# Patient Record
Sex: FEMALE | Race: WHITE | NOT HISPANIC OR LATINO | Employment: OTHER | URBAN - NONMETROPOLITAN AREA
[De-identification: names, ages, dates, MRNs, and addresses within clinical notes are randomized per-mention and may not be internally consistent; named-entity substitution may affect disease eponyms.]

---

## 2017-02-26 ENCOUNTER — HISTORY (OUTPATIENT)
Dept: FAMILY MEDICINE | Facility: OTHER | Age: 62
End: 2017-02-26

## 2017-02-26 ENCOUNTER — OFFICE VISIT - GICH (OUTPATIENT)
Dept: FAMILY MEDICINE | Facility: OTHER | Age: 62
End: 2017-02-26

## 2017-02-26 DIAGNOSIS — H57.11 PAIN OF RIGHT EYE: ICD-10-CM

## 2017-05-15 ENCOUNTER — COMMUNICATION - GICH (OUTPATIENT)
Dept: FAMILY MEDICINE | Facility: OTHER | Age: 62
End: 2017-05-15

## 2017-05-15 DIAGNOSIS — E28.319 ASYMPTOMATIC PREMATURE MENOPAUSE: ICD-10-CM

## 2017-07-10 ENCOUNTER — HOSPITAL ENCOUNTER (OUTPATIENT)
Dept: RADIOLOGY | Facility: OTHER | Age: 62
End: 2017-07-10
Attending: FAMILY MEDICINE

## 2017-07-10 ENCOUNTER — HISTORY (OUTPATIENT)
Dept: RADIOLOGY | Facility: OTHER | Age: 62
End: 2017-07-10

## 2017-07-10 DIAGNOSIS — Z12.31 ENCOUNTER FOR SCREENING MAMMOGRAM FOR MALIGNANT NEOPLASM OF BREAST: ICD-10-CM

## 2017-07-14 ENCOUNTER — OFFICE VISIT - GICH (OUTPATIENT)
Dept: FAMILY MEDICINE | Facility: OTHER | Age: 62
End: 2017-07-14

## 2017-07-14 ENCOUNTER — HISTORY (OUTPATIENT)
Dept: FAMILY MEDICINE | Facility: OTHER | Age: 62
End: 2017-07-14

## 2017-07-14 DIAGNOSIS — K21.9 GASTRO-ESOPHAGEAL REFLUX DISEASE WITHOUT ESOPHAGITIS: ICD-10-CM

## 2017-07-14 DIAGNOSIS — L57.0 ACTINIC KERATOSIS: ICD-10-CM

## 2017-07-17 ENCOUNTER — COMMUNICATION - GICH (OUTPATIENT)
Dept: FAMILY MEDICINE | Facility: OTHER | Age: 62
End: 2017-07-17

## 2017-07-17 ENCOUNTER — COMMUNICATION - GICH (OUTPATIENT)
Dept: SURGERY | Facility: OTHER | Age: 62
End: 2017-07-17

## 2017-07-17 DIAGNOSIS — Z12.11 ENCOUNTER FOR SCREENING FOR MALIGNANT NEOPLASM OF COLON: ICD-10-CM

## 2017-07-18 ENCOUNTER — COMMUNICATION - GICH (OUTPATIENT)
Dept: SURGERY | Facility: OTHER | Age: 62
End: 2017-07-18

## 2017-07-18 DIAGNOSIS — Z12.11 ENCOUNTER FOR SCREENING FOR MALIGNANT NEOPLASM OF COLON: ICD-10-CM

## 2017-08-11 ENCOUNTER — TRANSFERRED RECORDS (OUTPATIENT)
Dept: MULTI SPECIALTY CLINIC | Facility: CLINIC | Age: 62
End: 2017-08-11

## 2017-08-11 ENCOUNTER — SURGERY (OUTPATIENT)
Dept: SURGERY | Facility: OTHER | Age: 62
End: 2017-08-11

## 2017-08-11 ENCOUNTER — HOSPITAL ENCOUNTER (OUTPATIENT)
Dept: SURGERY | Facility: OTHER | Age: 62
Discharge: HOME | End: 2017-08-11
Attending: SURGERY | Admitting: SURGERY

## 2017-08-11 ENCOUNTER — HISTORY (OUTPATIENT)
Dept: SURGERY | Facility: OTHER | Age: 62
End: 2017-08-11

## 2017-08-17 ENCOUNTER — COMMUNICATION - GICH (OUTPATIENT)
Dept: SURGERY | Facility: OTHER | Age: 62
End: 2017-08-17

## 2017-08-22 ENCOUNTER — HISTORY (OUTPATIENT)
Dept: SURGERY | Facility: OTHER | Age: 62
End: 2017-08-22

## 2017-08-22 ENCOUNTER — OFFICE VISIT - GICH (OUTPATIENT)
Dept: SURGERY | Facility: OTHER | Age: 62
End: 2017-08-22

## 2017-08-22 DIAGNOSIS — K31.9 DISEASE OF STOMACH AND DUODENUM: ICD-10-CM

## 2017-09-14 ENCOUNTER — OFFICE VISIT - GICH (OUTPATIENT)
Dept: FAMILY MEDICINE | Facility: OTHER | Age: 62
End: 2017-09-14

## 2017-09-14 ENCOUNTER — HISTORY (OUTPATIENT)
Dept: FAMILY MEDICINE | Facility: OTHER | Age: 62
End: 2017-09-14

## 2017-09-14 DIAGNOSIS — Z00.00 ENCOUNTER FOR GENERAL ADULT MEDICAL EXAMINATION WITHOUT ABNORMAL FINDINGS: ICD-10-CM

## 2017-09-14 DIAGNOSIS — B02.30 ZOSTER OCULAR DISEASE: ICD-10-CM

## 2017-12-27 NOTE — PROGRESS NOTES
Patient Information     Patient Name MRN Miguelina Foss [id number] Female 1955      Progress Notes by Fady Oconnor MD at 2017  2:15 PM     Author:  Fady Oconnor MD Service:  (none) Author Type:  Physician     Filed:  2017  3:09 PM Encounter Date:  2017 Status:  Signed     :  Fady Oconnor MD (Physician)            SUBJECTIVE:    Miguelina Estrada is a 61 y.o. female who presents for abdomen pain and a skin worry    HPI    Lots of stress with her parents as they are aging and getting demented.  She also has been overeating lately, making her GERD worse.  Has stopped Prilosec last fall.  since then would just need tums once in a while. Worrying about her job and retiring soon.  This has increased the gastroesophogeal reflux disease symptoms.  Mostly burning at night.   One night, after pushing hard with a constipated stool, she felt a tearing sensation in the epigastrium and since then the pains have seemed worse.  Pain with breathing then, radiating into the sternum.  Was not able to swallow then.  Burning into mid back.  This lasted for 2 days or so.  slowly improving and now able to swallow normally.  Had an EGD in , told she had stomach polyps and a hiatal hernia then.  Was on prilosec since then.  No blood in stool.      Nose lesion on and off for about 1 year.  She had felt it was rosacea but seems to not respond well to metrogel.  No itching.    Allergies     Allergen  Reactions     Ees [Erythromycin] Nausea And Vomiting     Levaquin [Levofloxacin] Insomnia and Tachycardia     Pcn [Penicillins] Rash     Sulfa (Sulfonamide Antibiotics) Rash   ,   Current Outpatient Prescriptions on File Prior to Visit       Medication  Sig Dispense Refill     esterified estrogens-methylTESTOSTERone, 1.25-2.5 mg, (ESTRATEST) tablet TAKE 1 TABLET BY MOUTH EVERY DAY 90 tablet 3     No current facility-administered medications on file prior to visit.    ,   Past Medical  History:     Diagnosis  Date     Hiatal hernia      Hypoglycemia     episodes of hypoglycemia if not eating      Irregular cardiac rhythm      Premature menopause     mid 30s      Pupillary abnormality of both eyes 1993    Adie's tonic pupil (bilateral)     and   Past Surgical History:      Procedure  Laterality Date     ESOPHAGOGASTRODUODENOSCOPY  2009     LAP CHOLECYSTECTOMY  2007     OCTAVIA AND BSO  2003     TUBAL LIGATION  1980s       REVIEW OF SYSTEMS:  Review of Systems   Constitutional: Negative for chills and fever.   Respiratory: Negative for shortness of breath.    Cardiovascular: Negative for chest pain.   Gastrointestinal: Positive for abdominal pain, constipation and heartburn. Negative for blood in stool, diarrhea, melena, nausea and vomiting.       OBJECTIVE:  /64  Resp 16  Wt 62.5 kg (137 lb 12.8 oz)  BMI 23.65 kg/m2    EXAM:   Physical Exam   Constitutional: She is oriented to person, place, and time and well-developed, well-nourished, and in no distress. No distress.   HENT:   Head: Normocephalic and atraumatic.   Cardiovascular: Normal rate, regular rhythm and normal heart sounds.  Exam reveals no gallop and no friction rub.    No murmur heard.  Pulmonary/Chest: Effort normal. No respiratory distress. She has no wheezes. She has no rales.   Abdominal: Soft. Bowel sounds are normal. She exhibits no distension. There is no tenderness. There is no rebound and no guarding.   Neurological: She is alert and oriented to person, place, and time.   Skin: She is not diaphoretic.   Tip of nose with small, 1 mm, rough scaling papule most consistent with actinic keratosis.  Treated with 3 cycles of cryo.   Psychiatric: Memory, affect and judgment normal.       ASSESSMENT/PLAN:    ICD-10-CM    1. Chronic GERD K21.9 GASTROSCOPY/EGD - [facility]   2. Actinic keratosis L57.0 IA DESTROY PREMALIGNANT 1ST LESION        Plan:  Will have her start OTC zantac while waiting for the EGD.  She had some dysphagia so need to  rule out a stricture as well as a cancer.  Regarding the nose, will need annual checks and treatment if it recurs.    Fady Oconnor MD ....................  7/14/2017   3:05 PM

## 2017-12-27 NOTE — PROGRESS NOTES
Patient Information     Patient Name MRN Miguelina Foss [id number] Female 1955      Progress Notes by Miranda Quintana at 7/10/2017  2:09 PM     Author:  Miranda Quintana Service:  (none) Author Type:  (none)     Filed:  7/10/2017  2:10 PM Date of Service:  7/10/2017  2:09 PM Status:  Signed     :  Miranda Quintana            Falls Risk Criteria:    Age 65 and older or under age 4        Sensory deficits    Poor vision    Use of ambulatory aides    Impaired judgment    Unable to walk independently    Meets High Risk criteria for falls:  no

## 2017-12-28 NOTE — OR POSTOP
Patient Information     Patient Name MRN Miguelina Foss [id number] Female 1955      OR PostOp by Marielle Reid RN at 2017  3:13 PM     Author:  Marielle Reid RN Service:  (none) Author Type:  NURS- Registered Nurse     Filed:  2017  3:14 PM Date of Service:  2017  3:13 PM Status:  Signed     :  Marielle Reid RN (NURS- Registered Nurse)            Discharge Note    Data:  Miguelina Estrada has been discharged home at 1423 via ambulatory accompanied by Registered Nurse.      Action:  Written discharge/follow-up instructions were provided to patient. Prescriptions : None.  Belongings sent with patient. Medications from home sent with patient/family: NA.  Equipment none .     Response:  Patient verbalized understanding of discharge instructions, reason for discharge, and necessary follow-up appointments.

## 2017-12-28 NOTE — TELEPHONE ENCOUNTER
Patient Information     Patient Name Miguelina Galeana [id number] Female 1955      Telephone Encounter by Camryn Rodgers at 2017  9:30 AM     Author:  Camryn Rodgers Service:  (none) Author Type:  (none)     Filed:  2017  9:34 AM Encounter Date:  2017 Status:  Signed     :  Camryn Rodgers            Spoke with patient she is wondering if with her EGD you wanted to do a screening colonoscopy at the same time, she says this was discussed in your appointment on 17. Camryn Rodgers LPN .......................2017  9:34 AM

## 2017-12-28 NOTE — PROCEDURES
Patient Information     Patient Name MRN Miguelina Foss [id number] Female 1955      Procedures by Barbara Goss MD at 2017  1:42 PM     Author:  Barbara Goss MD Service:  (none) Author Type:  Physician     Filed:  2017  2:26 PM Date of Service:  2017  1:42 PM Status:  Signed     :  Barbara Goss MD (Physician)        Pre-procedure Diagnoses:    1. Burning reflux [R12]    2. Special screening for malignant neoplasms, colon [Z12.11]           Post-procedure Diagnoses:    1. Gastric polyps [K31.7]    2. Other chronic gastritis [K29.50]    3. Hiatal hernia [K44.9]    4. Rectal polyp [K62.1]           Procedures:    1. COLON EGD [830674 (Custom)]               PROCEDURE NOTE    SURGEON: Barbara Goss MD    PRE-OP DIAGNOSIS:   Reflux, epigastric pain, need for screening colonoscopy      POST-OP DIAGNOSIS: hiatal hernia (2 cm), gastric polyps, gastritis, rectal polyp    PROCEDURE PLANNED:   Diagnostic EGD      Screening Colonoscopy    PROCEDURE PERFORMED:  Flexible EGD with biopsies, colonoscopy with biopsy    SPECIMEN:  Antrum, gastric polyp, GEJ biopsies and rectal poylp    ANESTHESIA:  See anesthesia note    ESTIMATED BLOOD LOSS: none    COMPLICATIONS:  None    INDICATION FOR THE PROCEDURE: The patient is a 61 y.o. female. The patient presents reflux, epigastric pain and need for screening colonoscopy. I explained to the patient the risks, benefits and alternatives to diagnostic EGD and screening colonoscopy for evaluating her complaints and checking for colon polyps and colon cancer. We specifically discussed the risks of bleeding, infection, perforation, potential inability to reach the cecum and the risks of sedation. The patient's questions were answered and the patient wished to proceed. Informed consent paperwork was completed.    PROCEDURE: The patient was taken to the endoscopy suite. Appropriate monitors were attached.  The patient was placed in the left lateral  decubitus position. Bite block was positioned. Timeout was performed confirming the patient's identity and procedure to be performed.  After appropriate sedation was confirmed, the flexible endoscope was advanced into the oropharynx. The posterior oropharynx appeared grossly normal. The scope was advanced into the proximal esophagus. The esophagus was insufflated with air. The scope was advanced under direct visualization. No acute abnormalities of the esophagus were noted. The scope was advanced into the stomach. The scope was advanced through the pylorus into the duodenal bulb. The bulb and distal duodenum appeared grossly normal. The scope was withdrawn back into the stomach. Mild gastritis was noted. Antral biopsy was obtained and sent to pathology. Gastric polyps were noted and biopsy obtained. The scope was retroflexed and the GE junction inspected. A small hiatal hernia was noted (less than 2 cm). The scope was returned to a neutral position and the stomach was decompressed. The scope was withdrawn to the GE junction and biopsy obtained. The mucosa of the esophagus was inspected while withdrawing the scope. No abnormalities were noted. The scope was withdrawn from the patient. The bite block was removed.    The patient was repositioned. After appropriate sedation, digital rectal exam was performed.  There was normal tone and no gross abnormality was noted.  The lubricated flexible colonoscope was introduced into the anus the colon was insufflated with air. The prep quality was adequate. Under direct visualization the scope was advanced to the cecum. The ileocecal valve was intubated and the terminal ileum inspected. No gross abnormality was noted. The scope was withdrawn back into the cecum. The mucosa of colon was inspected while withdrawing the scope. A 3 mm sessile polyp was noted in the rectum and removed with cold forceps. The scope was retroflexed in the rectum and the anorectal junction was inspected.  No abnormalities were noted. The scope was returned to a neutral position and the colon was decompressed. The scope was removed. The patient tolerated the procedure with no immediately apparent complication. The patient was taken to recovery in stable condition.    FOLLOW UP:  RECOMMENDATIONS high fiber diet, will call with pathology results. Continue on current medication for now.    Barbara Goss MD     CC: Fady Oconnor MD

## 2017-12-28 NOTE — PROGRESS NOTES
Patient Information     Patient Name MRN Miguelina Foss [id number] Female 1955      Progress Notes by Fady Oconnor MD at 2017  8:29 AM     Author:  Fady Oconnor MD Service:  (none) Author Type:  Physician     Filed:  2017 10:59 AM Encounter Date:  2017 Status:  Signed     :  Fady Oconnor MD (Physician)              SUBJECTIVE:    Miguelina Estrada is a 61 y.o. female who presents for px    HPI: About 3 months ago she started to have right eye symptoms, that ended up being a herpes infection.  Sees an optometrist for this now, who has been using topical antiviral medications, but he wants her to also get on an oral prevention med.  Some nerve type pains in the eye.  Current vision is normal in the right.      Otherwise is doing well.  No longer using zantac, based on EGD findings of bile acid reflux.  Now gets symptoms really only based on what she eats.    PROBLEM LIST:  Patient Active Problem List     Diagnosis  Code     Premature menopause on HRT E28.319     Rosacea L71.9     Chronic GERD K21.9     Ophthalmic herpes zoster B02.30     PAST MEDICAL HISTORY:  Past Medical History:     Diagnosis  Date     Hiatal hernia      Hypoglycemia     episodes of hypoglycemia if not eating      Irregular cardiac rhythm      Premature menopause     mid 30s      Pupillary abnormality of both eyes     Adie's tonic pupil (bilateral)      SURGICAL HISTORY:  Past Surgical History:      Procedure  Laterality Date     COLON EGD  2017            ESOPHAGOGASTRODUODENOSCOPY  2009     LAP CHOLECYSTECTOMY  2007     OCTAVIA AND BSO       TUBAL LIGATION         SOCIAL HISTORY:  Social History     Social History        Marital status:       Spouse name: Jerrod     Number of children:  1     Years of education:  N/A     Occupational History      Not on file.     Social History Main Topics       Smoking status: Never Smoker     Smokeless tobacco: Never Used     Alcohol  use No     Drug use: No     Sexual activity: Not on file     Other Topics  Concern     Not on file      Social History Narrative     Working as an  at [employer], since 9/13.     .  is retired.  One daughter in [place] born 1973                         FAMILY HISTORY:  Family History       Problem   Relation Age of Onset     Diabetes  Father 60     Other  Father 55     Atrial fibrillation       Hypertension  Mother      Heart Disease  Mother 86     silent MI, CHF       Hypertension  Brother      Cancer-breast  No Family History       CURRENT MEDICATIONS:   Current Outpatient Prescriptions       Medication  Sig Dispense Refill     esterified estrogens-methylTESTOSTERone, 1.25-2.5 mg, (ESTRATEST) tablet TAKE 1 TABLET BY MOUTH EVERY DAY 90 tablet 3     LYSINE ORAL Take 1,500 mg by mouth once daily.       ranitidine (ZANTAC 75) 75 mg tablet Take 1 tablet by mouth once daily.  0     sucralfate (CARAFATE) 1 gram tablet Take 1 tablet by mouth 3 times daily before meals. 90 tablet 1     No current facility-administered medications for this visit.      Medications have been reviewed by me and are current to the best of my knowledge and ability.    ALLERGIES:  Ees [erythromycin]; Levaquin [levofloxacin]; Pcn [penicillins]; and Sulfa (sulfonamide antibiotics)     REVIEW OF SYSTEMS:  General: denies any general problems.  Eyes: see HPI  Ears/Nose/Throat: denies problems  Cardiovascular: denies problems  Respiratory: denies problems  Gastrointestinal: see HPI  Genitourinary: denies problems  Musculoskeletal: denies problems  Skin: denies problems  Neurologic: denies problems  Psychiatric: some stress with caring for aging parents.  Endocrine: denies problems  Heme/Lymphatic: denies problems  Allergic/Immunologic: denies problems  PHQ Depression Screening 9/14/2017   Date of PHQ exam (doc flow) 9/14/2017   1. Lack of interest/pleasure 0 - Not at all   2. Feeling down/depressed 0  "- Not at all   PHQ-2 TOTAL SCORE 0   Some recent data might be hidden       OBJECTIVE:  /64  Pulse 64  Resp 16  Ht 1.626 m (5' 4\")  Wt 63 kg (138 lb 12.8 oz)  BMI 23.82 kg/m2  EXAM:   General Appearance: Pleasant, alert, appropriate appearance for age. No acute distress  Head Exam: Normal. Normocephalic, atraumatic.  Eye Exam:  Normal external eye, conjunctiva, lids, cornea. RAYA.  Ear Exam: Normal TM's bilaterally. Normal auditory canals and external ears. Non-tender.  Nose Exam: Normal external nose, mucus membranes, and septum.  OroPharynx Exam:  Dental hygiene adequate. Normal buccal mucose. Normal pharynx.  Neck Exam:  Supple, no masses or nodes.  Thyroid Exam: No nodules or enlargement.  Chest/Respiratory Exam: Normal chest wall and respirations. Clear to auscultation.  Breast Exam: No dimpling, nipple retraction or discharge. No masses or nodes.  Cardiovascular Exam: Regular rate and rhythm. S1, S2, no murmur, click, gallop, or rubs.  Gastrointestinal Exam: Soft, non-tender, no masses or organomegaly.  Lymphatic Exam: Non-palpable nodes in neck, clavicular, axillary, or inguinal regions.  Musculoskeletal Exam: Back is straight and non-tender, full ROM of upper and lower extremities.  Skin: no rash or abnormalities  Psychiatric Exam: Alert and oriented - appropriate affect.    ASSESSMENT/PLAN    ICD-10-CM    1. Routine general medical examination at a health care facility Z00.00    2. Ophthalmic herpes zoster B02.30 acyclovir (ZOVIRAX) 400 mg tablet     Ms. Torres Body mass index is 23.82 kg/(m^2). This is within the normal range for a 61 y.o. Normal range for ages 18+ is between 18.5 and 24.9.  BP Readings from Last 1 Encounters:09/14/17 : 120/64  Ms. Torres blood pressure is out of the normal range for adults. Per JNC-8 guidelines normal adult blood pressure is < 120/80, pre-hypertensive is between 120/80 and 139/89, and hypertension is 140/90 or greater. Risks of hypertension were " discussed. Patient's strategy will be to recheck blood pressure in 12 months

## 2017-12-28 NOTE — OR ANESTHESIA
Patient Information     Patient Name MRN Sex Miguelina Vazquez [id number] Female 1955      OR Anesthesia by Melissa Duque CRNA at 2017 12:02 PM     Author:  Melissa Duque CRNA Service:  (none) Author Type:  NURS- Nurse Anesthetist     Filed:  2017 12:02 PM Date of Service:  2017 12:02 PM Status:  Signed     :  Melissa Duque CRNA (NURS- Nurse Anesthetist)                                                           ANESTHESIA ASSESSMENT    Date: 17 Time: 12:02 PM      Patient:  Miguelina Estrada    Procedure(s) (LRB):  COLONOSCOPY ESOPHAGEALGASTRODUODENOSCOPY (N/A)    Past Medical History:     Diagnosis  Date     Hiatal hernia      Hypoglycemia     episodes of hypoglycemia if not eating      Irregular cardiac rhythm      Premature menopause     mid 30s      Pupillary abnormality of both eyes     Adie's tonic pupil (bilateral)        Past Surgical History:      Procedure  Laterality Date     ESOPHAGOGASTRODUODENOSCOPY       LAP CHOLECYSTECTOMY  2007     OCTAVIA AND BSO  2003     TUBAL LIGATION  1980s       Family History       Problem   Relation Age of Onset     Diabetes  Father 60     Other  Father 55     Atrial fibrillation       Hypertension  Mother      Heart Disease  Mother 86     silent MI, CHF       Hypertension  Brother      Cancer-breast  No Family History        Patient Active Problem List     Diagnosis  Code     Premature menopause on HRT E28.319     Rosacea L71.9     Chronic GERD K21.9     Special screening for malignant neoplasms, colon Z12.11       Prescriptions Prior to Admission       Medication  Sig Dispense Refill     esterified estrogens-methylTESTOSTERone, 1.25-2.5 mg, (ESTRATEST) tablet TAKE 1 TABLET BY MOUTH EVERY DAY 90 tablet 3     LYSINE ORAL Take 1,500 mg by mouth once daily.       ranitidine (ZANTAC 75) 75 mg tablet Take 75 mg by mouth 2 times daily.         Allergies:  Allergies     Allergen  Reactions     Ees [Erythromycin] Nausea And  Vomiting     Levaquin [Levofloxacin] Insomnia and Tachycardia     Pcn [Penicillins] Rash     Sulfa (Sulfonamide Antibiotics) Rash       Review of Systems:  GERD: Yes (none today)  Chest pain: No  Shortness of breath: No  Recent fever: No  Poor exercise tolerance: No  Bleeding tendency: No  Pregnant: No  Anesthesia Complications: Slow wake (sensitive to anethetics per pt report)      History    Smoking Status      Never Smoker   Smokeless Tobacco      Never Used     Social History     Social History        Marital status:       Spouse name: Jerrod     Number of children:  1     Years of education:  N/A     Social History Main Topics       Smoking status: Never Smoker     Smokeless tobacco: Never Used     Alcohol use No     Drug use: No     Sexual activity: Not Asked     Other Topics  Concern     None      Social History Narrative     Working as an  at [employer], since 9/13.     .  is retired.  One daughter in [place] born 1973                           Physical Examination:  /65  Pulse 73  Temp 98.2  F (36.8  C)  Resp 16  SpO2 100%  Breastfeeding? No There is no height or weight on file to calculate BMI. There is no height or weight on file to calculate BSA.  Dental Condition: Good     Mallampati Score (Airway): II  Cardiovascular: Normal  Pulmonary: Normal  Other: N/A    Recent Labs [facility]:    No results for input(s): SODIUM, POTASSIUM, CHLORIDE, YP7DQONI, ANIONGAP, BUN, CREATININE, BUNCREARATIO, CALCIUM, GLUCOSE, GLUCOSEMETER, KETONES, MAGNESIUM, WBC, HGB, HCT, PLT, ABORH, RHTYPE, PREGURINE, BHCGQL, HCGBETAQUANT, INR in the last 72 hours.          Assessment/Plan:  ASA Class: II  Risk of dental injury discussed: Yes  NPO status confirmed: Yes  Anesthetic Plan: MAC  Risk/Benefit/Alt discussed: Yes  Questions answered: Yes  Emergency Case?: No  Labs/ECG/Radiology Reviewed?: Yes      H&P Reviewed.  Patient Examined.      Provider Electronic  Signature:  Melissa Duque CRNA

## 2017-12-28 NOTE — PROGRESS NOTES
Patient Information     Patient Name MRN Miguelina Foss [id number] Female 1955      Progress Notes by Barbara Goss MD at 2017  2:30 PM     Author:  Barbara Goss MD Service:  (none) Author Type:  Physician     Filed:  2017  1:56 PM Encounter Date:  2017 Status:  Signed     :  Barbara Goss MD (Physician)            Primary Care Physician: Fady Oconnor MD    HPI:   Patient is here for follow up. The patient is 61 y.o. female with chronic reflux. She recently had EGD with biopsy and screening colonoscopy. No adenomatous polyps were noted on colonoscopy-10 year screening recommended. She had EGD biopsies that showed gastropathy, fundic gland polyp and reflux changes. She is not wanting to take proton pump inhibitor due to side effects. She hasn't had dark stools or diarrhea. No nausea or vomiting.      ASSESSMENT AND PLAN/RECOMMENDATIONS:   Gastropathy and mild reflux changes of the distal esophagus. Use the carafate three times daily-can use at bedtime too if needed. H2 blocker once day. Discussed the pathophysiology of bile reflux gastritis and the importance of acid and bile balancing out for healthy stomach lining, digestion and absorption of nutrients. Patient expressed understanding. She will call if she has any ongoing symptoms or is not much improved in the next couple of weeks.       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       ranitidine (ZANTAC 75) 75 mg tablet Take 75 mg by mouth 2 times daily.       No current facility-administered medications on file prior to visit.      ALLERGIES/SENSITIVITIES  Allergies     Allergen  Reactions     Ees [Erythromycin] Nausea And Vomiting     Levaquin [Levofloxacin] Insomnia and Tachycardia     Pcn [Penicillins] Rash     Sulfa (Sulfonamide Antibiotics) Rash     REVIEW OF SYSTEMS:  GENERAL: No fevers or chills. Denies fatigue, recent weight loss.  HEENT: No sinus drainage. No changes with vision or hearing. No difficulty swallowing.   LYMPHATICS:  No swollen nodes in axilla, neck or groin.  CARDIOVASCULAR: Denies chest pain, palpitations and dyspnea on exertion.  PULMONARY: No shortness of breath or cough. No increase in sputum production.  GI: Denies melena, bright red blood in stools. No hematemesis. No constipation or diarrhea.  : No dysuria or hematuria.  SKIN: No recent rashes or ulcers.   HEMATOLOGY:  No history of easy bruising or bleeding.  ENDOCRINE:  No history of diabetes or thyroid problems.  NEUROLOGY:  No history of seizures or headaches. No motor or sensory changes.    PHYSICAL EXAM  /60  Pulse 76  Wt 63.1 kg (139 lb 2 oz)  BMI 23.88 kg/m2  GENERAL: Healthy appearing patient in no acute distress. Pleasant and cooperative with exam and interview.   HEENT: Head-normocephalic. Eyes-no scleral icterus. Nose-no nasal drainage. No lesions. Mouth-oral mucosa pink and moist, no lesions.  NECK: Supple. No thyroid nodules. Trachea midline.  LYMPHATICS:  No cervical, axillary or supraclavicular adenopathy.  CV: Regular rate and rhythm, no murmurs. No peripheral edema.  LUNGS:  No respiratory distress. Clear bilaterally to auscultation.  ABDOMEN: Non distended. Bowel  sounds active. Soft, non-tender, no hepatosplenomegaly or hernias. No peritoneal signs.  SKIN: Pink, warm and dry. No jaundice. No rash.  NEURO:  Cranial nerves II-XII grossly intact. Alert and oriented.  PSYCH: Appropriate mood and affect.      IMAGING/LAB  I personally reviewed patient's recent EGD photos, report and pathology report.      Barbara Goss MD

## 2017-12-28 NOTE — TELEPHONE ENCOUNTER
Patient Information     Patient Name MRMiguelina Myers [id number] Female 1955      Telephone Encounter by Brigitte Hill at 2017 12:59 PM     Author:  Brigitte Hill Service:  (none) Author Type:  (none)     Filed:  2017  1:07 PM Encounter Date:  2017 Status:  Signed     :  Brigitte Hill            Screening Questions for the Scheduling of Screening Colonoscopies   (If Colonoscopy is diagnostic, Provider should review the chart before scheduling.)  Are you younger than 50 or older than 80?  NO   Do you take aspirin or fish oil?  NO  (if yes, tell patient to stop 1 week prior to Colonoscopy)  Do you take warfarin (Coumadin), clopidogrel (Plavix), apixaban (Eliquis), dabigatram (Pradaxa), rivaroxaban (Xarelto) or any blood thinner? NO   Do you use oxygen at home?  NO  Do you have kidney disease? NO  Are you on dialysis? NO  Have you had a stroke or heart attack in the last year? NO  Have you had a stent in your heart or any blood vessel in the last year? NO  Have you had a transplant of any organ? NO  Have you had a colonoscopy or upper endoscopy (EGD) before? YES -  EGD         When?   - [place]   Date of scheduled Colonoscopy. 08/10/2017  Provider JARRELL   Pharmacy [facility]

## 2017-12-28 NOTE — TELEPHONE ENCOUNTER
Patient Information     Patient Name MRN Miguelina Foss [id number] Female 1955      Telephone Encounter by Brigitte Hill at 2017 10:22 AM     Author:  Brigitte Hill  Service:  (none) Author Type:  (none)     Filed:  2017  2:18 PM  Encounter Date:  2017 Status:  Addendum     :  Brigitte Hill        Related Notes: Original Note by Brigitte Hill filed at 2017 10:23 AM            Patient referred by Dr. Oconnor for a EGD and screening colonoscopy,  Diagnosis is chronic GERD for the EGD.  Please advise.    Thank you.

## 2017-12-28 NOTE — OR ANESTHESIA
"Patient Information     Patient Name MRN Sex Miguelina Vazquez [id number] Female 1955      OR Anesthesia by Melissa Duque CRNA at 2017  1:39 PM     Author:  Melissa Duque CRNA Service:  (none) Author Type:  NURS- Nurse Anesthetist     Filed:  2017  1:39 PM Date of Service:  2017  1:39 PM Status:  Signed     :  Melissa Duque CRNA (NURS- Nurse Anesthetist)            Anesthesia Post Operative Care Note    Name: Miguelina Estrada  MRN:   [id number]  :    1955       Procedure Done:  See Surgeon Note   Case Cancelled for Anesthetic Reason:  No      Anesthesia Technique    Anesthetic Type:  MAC       MAC Type:  NC     Oral Trauma:  No    Intraoperative Course   Hemodynamics:  Stable  Post Op Heart:  No Post Op Heart        Echocardiogram (if \"Yes\" - see report):  No    Ventilation Normal:  Yes Lung Sounds:  Normal      PACU Course      Nondepolarizer Used: No    Reintubation:  No   Hemodynamics:  Stable      Hydration: Euvolemic   Temperature:  36.1 - 38.3      Mental Status:  Awake, alert, follows commands   Pain Management:  Adequate   Regional Block:  No   Anesthesia Complications:  None      Vital Signs:  Temp: 98.2  F (36.8  C)  Pulse: 73  BP: 135/65  Resp: 16  SpO2: 100 %                       Active Lines:  Patient Lines/Drains/Airways Status    Active Line     Name: Placement date: Placement time: Site: Days:    PERIPHERAL VAD Right Hand 17   1130   Hand   less than 1                Intake & Output:       Labs:  No results for input(s): IJ2ZWWXBVTS, MNQ7ASLABIBI, PHARTERIAL, YOV2DRNQOJIQ, D7NCKELAPVMV in the last 24 hours.    No results for input(s): MAGNESIUM in the last 24 hours.    No results for input(s): GLUCOSEMETER in the last 720 hours.        Melissa Duque CRNA ....................  2017   1:39 PM          "

## 2017-12-28 NOTE — PATIENT INSTRUCTIONS
Patient Information     Patient Name MRMiguelina Myers [id number] Female 1955      Patient Instructions by Barbara Goss MD at 2017  2:30 PM     Author:  Barbara Goss MD Service:  (none) Author Type:  Physician     Filed:  2017  3:16 PM Encounter Date:  2017 Status:  Signed     :  Barbara Goss MD (Physician)            Try the carafate three times a day. You can add another at bedtime if you need to. Let me know if you are not improving in the next couple times a week.

## 2017-12-28 NOTE — TELEPHONE ENCOUNTER
Patient Information     Patient Name MRMiguelina Myers [id number] Female 1955      Telephone Encounter by Naima Walter at 2017  2:42 PM     Author:  Naima Walter Service:  (none) Author Type:  (none)     Filed:  2017  2:42 PM Encounter Date:  2017 Status:  Signed     :  Naima Walter            Please see result note.  Naima Walter LPN.......................... 2017  2:42 PM

## 2017-12-28 NOTE — TELEPHONE ENCOUNTER
Patient Information     Patient Name MRN Miguelina Foss [id number] Female 1955      Telephone Encounter by Barbara Goss MD at 2017 12:40 PM     Author:  Barbara Goss MD Service:  (none) Author Type:  Physician     Filed:  2017 12:40 PM Encounter Date:  2017 Status:  Signed     :  Barbara Goss MD (Physician)            Ok to schedule. Thanks! Barbara Goss MD ....................  2017   12:40 PM

## 2017-12-29 NOTE — H&P
Patient Information     Patient Name MRN Miguelina Foss [id number] Female 1955      H&P by Barbara Goss MD at 2017 12:36 PM     Author:  Barbara Goss MD Service:  (none) Author Type:  Physician     Filed:  2017 12:38 PM Date of Service:  2017 12:36 PM Status:  Signed     :  Barbara Goss MD (Physician)            CHIEF COMPLAINT / REASON FOR PROCEDURE:  Reflux, need for screening colonoscopy    PERTINENT HISTORY   Patient complains of recurrent reflux. Previous colonoscopy none, previous EGD 8 years ago-small hiatal hernia, gastric polyps. No family history of colon polyps or colon cancer.    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 See HPI.    ALLERGIES/SENSITIVITIES:   Allergies     Allergen  Reactions     Ees [Erythromycin] Nausea And Vomiting     Levaquin [Levofloxacin] Insomnia and Tachycardia     Pcn [Penicillins] Rash     Sulfa (Sulfonamide Antibiotics) Rash        CURRENT MEDICATIONS:    Current Facility-Administered Medications        Medication  Dose Route Frequency Last Rate     lactated Ringers infusion  25 mL/hr Intravenous continuous 25 mL/hr (17 1130)     lidocaine (1%) injection 0.1-1 mL  0.1-1 mL Intra-Dermal one time prn       sodium chloride 0.9% 5 mL syringe (NORMAL SALINE)  5 mL Intravenous Each Time PRN        Prior to Admission medications          Medication Sig Start Date  End Date Taking? Last Dose Authorizing Provider   esterified estrogens-methylTESTOSTERone, 1.25-2.5 mg, (ESTRATEST) tablet TAKE 1 TABLET BY MOUTH EVERY DAY 5/16/17   8/10/2017 at 2000 Fady Oconnor MD   LYSINE ORAL Take 1,500 mg by mouth once daily.   Yes 8/10/2017 at 2000 Reported, Patient   ranitidine (ZANTAC 75) 75 mg tablet Take 75 mg by mouth 2 times daily.   Yes 8/10/2017 at 0700 Reported, Patient     PRE-SEDATION ASSESSMENT:    Lung Exam:  Normal  Heart Exam:  Normal    Comment(s):      IMPRESSION:  Chronic reflux, need for screening colonoscopy.    PLAN:  I discussed diagnostic EGD and colonoscopy with the patient.    Barbara Goss MD

## 2017-12-30 NOTE — NURSING NOTE
Patient Information     Patient Name MRN Miguelina Foss [id number] Female 1955      Nursing Note by Shannan Morrison at 2017  2:15 PM     Author:  Shannan Morrison Service:  (none) Author Type:  (none)     Filed:  2017  2:51 PM Encounter Date:  2017 Status:  Signed     :  Shannan Morrison            Coming in to talk about stomach issues, her GERD is bad. Last week was bad. Issues, check spot on the face  Shannan Morrison ....................  2017   2:17 PM

## 2017-12-30 NOTE — NURSING NOTE
Patient Information     Patient Name MRN Miguelina Foss [id number] Female 1955      Nursing Note by Shannan Morrison at 2017  8:30 AM     Author:  Shannan Morrison Service:  (none) Author Type:  (none)     Filed:  2017  8:28 AM Encounter Date:  2017 Status:  Signed     :  Shannan Morrison            Coming in for a Physical   Shannan Morrison ....................  2017   8:19 AM

## 2017-12-30 NOTE — NURSING NOTE
Patient Information     Patient Name MRMiguelina Myers [id number] Female 1955      Nursing Note by Marcy Sabillon at 2017  2:30 PM     Author:  Marcy Sabillon Service:  (none) Author Type:  (none)     Filed:  2017  2:44 PM Encounter Date:  2017 Status:  Signed     :  Marcy Sabillon            Here today to follow up from a EGD and Colonoscopy.  Marcy Sabillon LPN..........2017  2:41 PM

## 2018-01-03 NOTE — PROGRESS NOTES
"Patient Information     Patient Name MRN Miguelina Foss [id number] Female 1955      Progress Notes by Veronica Rodriguez at 2017  8:30 AM     Author:  Veronica Rodriguez Service:  (none) Author Type:  PHYS- Nurse Practitioner     Filed:  3/2/2017  7:32 PM Encounter Date:  2017 Status:  Signed     :  Veronica Rodriguez (PHYS- Nurse Practitioner)            HPI:    Miguelina Estrada is a 61 y.o. female who presents to clinic today for pain in right eye. [facility] , had a usual eye apt on  and believes there was something mild on her lower lid present then, \"but it was fine\".   She then developed what she thought was an inflamed chalazan, She then saw Dr. Page briefly 5 days ago for that.  Was instructed to use warm packs, and gel drops and return to see him Friday if it was not improved. She reports that he indicated she may need to have it drained if it had not improved.  Swelling has gone down somewhat but pt feels it is more \"pointed\" now and it feels like it is poking her cornea. Rates discomfort 7-8/10. Pain was up to 10 during the night. Reports has had chazilian in the past but did not feel like this. Vision \"seems ok, is tearing somewhat so a little blurry\".  No fever or chills. Has had some nausea, but otherwise is feeling well. Pain was radiating into side of face. Pokey sensation in eye for about a week. Has history of herpes. Gets a rash on her posterior leg, for which she takes lysine, this has reduced the frequency. Also reports recently did have a cold sore this past week.      Past Medical History      Diagnosis   Date     Hiatal hernia       Hypoglycemia       episodes of hypoglycemia if not eating      Irregular cardiac rhythm       Premature menopause       mid 30s      Pupillary abnormality of both eyes       Adie's tonic pupil (bilateral)      Past Surgical History      Procedure  Laterality Date     " Fabrizio and bso  2003     Lap cholecystectomy  2007     Tubal ligation  1980s     Esophagogastroduodenoscopy  2009     Social History     Substance Use Topics       Smoking status: Never Smoker     Smokeless tobacco: Never Used     Alcohol use No     Current Outpatient Prescriptions       Medication  Sig Dispense Refill     esterified estrogens-methylTESTOSTERone, 1.25-2.5 mg, (ESTRATEST) tablet TAKE 1 TABLET BY MOUTH ONCE DAILY 90 tablet 3     metroNIDAZOLE (METROGEL) 0.75 % gel Apply 1 Film topically to affected area(s) 2 times daily. 45 g 12     omeprazole (PRILOSEC) 20 mg capsule Take 1 capsule by mouth once daily before a meal.  0     No current facility-administered medications for this visit.      Medications have been reviewed by me and are current to the best of my knowledge and ability.    Allergies     Allergen  Reactions     Ees [Erythromycin] Nausea And Vomiting     Levaquin [Levofloxacin] Insomnia and Tachycardia     Pcn [Penicillins] Rash     Sulfa (Sulfonamide Antibiotics) Rash       ROS:  Refer to HPI, otherwise negative.    EXAM:  General appearance: well appearing female, in mild distress  Head: normocephalic, atraumatic  Eyes: conjunctivae slightly injected on left, tearing and injected on right. Lower lid just lateral to duct-inner canthus with 2-3 mm concave lesion. No foreign body visible. Wood light exam with small 1-2 mm comma shaped corneal abrasion at 5:00 o'clock.   Orophayrnx: moist mucous membranes,   Respiratory: clear to auscultation bilaterally  Cardiac: RRR with no murmurs  Dermatological: no rashes or lesions  Psychological: normal affect, alert and pleasant    ASSESSMENT/PLAN:    ICD-10-CM    1. Eye pain, right H57.11 IA EYE EXAM ESTABLISHED PT C      tetracaine 0.5 % ophthalmic solution 2 Drop (TETRACAINE)       Plan: Lower lid lesion, discussed with pt., 2 drops of tetracaine, fluorescein applied, Wood light exam shows small corneal abrasion.  Probable herpetic lesion of lower lid.  Consulted with Dr. Mayorga at Dr. Osorio office. .    He agrees to examine patient at 11:15 AM. Discussed with pt and she agree to see him.   Instructed she may need to follow up with Dr. Vieira on Monday as well.    See patient instructions. Pt in agreements with plan.  No further questions.       Patient Instructions   I spoke to Dr. Mayorga at [facility] office and he is willing to have a look at your eye today at 11:15 today.     [facility] office is [facility], across from the police station. He will meet you in back.     We discussed treatment and he will order any medications you need.

## 2018-01-03 NOTE — PATIENT INSTRUCTIONS
Patient Information     Patient Name MRN Miguelina Foss [id number] Female 1955      Patient Instructions by Veronica Rodriguez at 2017  8:30 AM     Author:  Veronica Rodriguez Service:  (none) Author Type:  PHYS- Nurse Practitioner     Filed:  2017  9:50 AM Encounter Date:  2017 Status:  Signed     :  Veronica Rodriguez (PHYS- Nurse Practitioner)            I spoke to Dr. Mayorga at [facility] and he is willing to have a look at your eye today at 11:15 today.     [facility] office is [facility], across from the police station. He will meet you in back.     We discussed treatment and he will order any medications you need.

## 2018-01-03 NOTE — NURSING NOTE
Patient Information     Patient Name Miguelina Galeana [id number] Female 1955      Nursing Note by Emily Ortiz at 2017  8:30 AM     Author:  Emily Ortiz Service:  (none) Author Type:  (none)     Filed:  2017  8:45 AM Encounter Date:  2017 Status:  Signed     :  Emily Ortiz            She has something on the inside lower lid of her right eye and it is rubbing on her eye and causing her a lot of pain. It is red and irritated.  Emily Ortiz LPN..................2017   8:44 AM

## 2018-01-05 NOTE — TELEPHONE ENCOUNTER
Patient Information     Patient Name MRMiguelina Myers [id number] Female 1955      Telephone Encounter by Kay Lundberg at 2017 11:09 AM     Author:  Kay Lundberg Service:  (none) Author Type:  (none)     Filed:  2017 11:09 AM Encounter Date:  5/15/2017 Status:  Signed     :  Kay Lundberg            Rx faxed to pharmacy.  Kay HARRINGTON, ADELA.......2017..11:09 AM

## 2018-01-05 NOTE — TELEPHONE ENCOUNTER
Patient Information     Patient Name MRMiguelina Myers [id number] Female 1955      Telephone Encounter by Marlin Huddleston RN at 5/15/2017  4:31 PM     Author:  Marlin Huddleston RN Service:  (none) Author Type:  (none)     Filed:  5/15/2017  4:38 PM Encounter Date:  5/15/2017 Status:  Signed     :  Marlin Huddleston RN (NURS- Registered Nurse)            This is a Refill request from: [facility]  Name of Medication: Esterified Estrogens-methltestosterone  Quantity requested: #90 R-3  Last fill date: 2016  Due for refill: Yes  Last visit with FADY OCONNOR was on: 2016 in [facility]  PCP:  Fady Oconnor MD  Controlled Substance Agreement:  N/A   Diagnosis r/t this medication request: premature menopause     Unable to complete prescription refill per RN Medication Refill Policy.................... Marlin Huddleston ....................  5/15/2017   4:36 PM

## 2018-01-23 ENCOUNTER — COMMUNICATION - GICH (OUTPATIENT)
Dept: FAMILY MEDICINE | Facility: OTHER | Age: 63
End: 2018-01-23

## 2018-01-23 DIAGNOSIS — Z79.890 HORMONE REPLACEMENT THERAPY (POSTMENOPAUSAL): ICD-10-CM

## 2018-01-23 DIAGNOSIS — E28.319 ASYMPTOMATIC PREMATURE MENOPAUSE: ICD-10-CM

## 2018-01-26 ENCOUNTER — COMMUNICATION - GICH (OUTPATIENT)
Dept: FAMILY MEDICINE | Facility: OTHER | Age: 63
End: 2018-01-26

## 2018-01-26 DIAGNOSIS — Z79.890 HORMONE REPLACEMENT THERAPY (POSTMENOPAUSAL): ICD-10-CM

## 2018-01-26 DIAGNOSIS — E28.319 ASYMPTOMATIC PREMATURE MENOPAUSE: ICD-10-CM

## 2018-01-27 VITALS
DIASTOLIC BLOOD PRESSURE: 74 MMHG | RESPIRATION RATE: 16 BRPM | WEIGHT: 140 LBS | HEIGHT: 64 IN | TEMPERATURE: 97.4 F | SYSTOLIC BLOOD PRESSURE: 116 MMHG | HEART RATE: 64 BPM | WEIGHT: 138.8 LBS | HEART RATE: 88 BPM | BODY MASS INDEX: 23.7 KG/M2 | DIASTOLIC BLOOD PRESSURE: 64 MMHG | BODY MASS INDEX: 24.03 KG/M2 | SYSTOLIC BLOOD PRESSURE: 120 MMHG

## 2018-01-27 VITALS
SYSTOLIC BLOOD PRESSURE: 124 MMHG | WEIGHT: 137.8 LBS | RESPIRATION RATE: 16 BRPM | BODY MASS INDEX: 23.65 KG/M2 | DIASTOLIC BLOOD PRESSURE: 64 MMHG

## 2018-01-27 VITALS — HEART RATE: 76 BPM | DIASTOLIC BLOOD PRESSURE: 60 MMHG | SYSTOLIC BLOOD PRESSURE: 130 MMHG | WEIGHT: 139.13 LBS

## 2018-02-12 ENCOUNTER — OFFICE VISIT (OUTPATIENT)
Dept: FAMILY MEDICINE | Facility: OTHER | Age: 63
End: 2018-02-12
Attending: FAMILY MEDICINE
Payer: COMMERCIAL

## 2018-02-12 VITALS
BODY MASS INDEX: 24.55 KG/M2 | HEART RATE: 64 BPM | WEIGHT: 143 LBS | TEMPERATURE: 98.7 F | SYSTOLIC BLOOD PRESSURE: 138 MMHG | DIASTOLIC BLOOD PRESSURE: 72 MMHG

## 2018-02-12 DIAGNOSIS — R35.0 URINARY FREQUENCY: Primary | ICD-10-CM

## 2018-02-12 LAB
ALBUMIN UR-MCNC: NEGATIVE MG/DL
APPEARANCE UR: CLEAR
BILIRUB UR QL STRIP: NEGATIVE
COLOR UR AUTO: YELLOW
GLUCOSE UR STRIP-MCNC: NEGATIVE MG/DL
HGB UR QL STRIP: NEGATIVE
KETONES UR STRIP-MCNC: NEGATIVE MG/DL
LEUKOCYTE ESTERASE UR QL STRIP: NEGATIVE
NITRATE UR QL: NEGATIVE
PH UR STRIP: 7 PH (ref 5–7)
SOURCE: NORMAL
SP GR UR STRIP: <1.005 (ref 1–1.03)
UROBILINOGEN UR STRIP-ACNC: 0.2 EU/DL (ref 0.2–1)

## 2018-02-12 PROCEDURE — 81003 URINALYSIS AUTO W/O SCOPE: CPT | Performed by: FAMILY MEDICINE

## 2018-02-12 PROCEDURE — 99213 OFFICE O/P EST LOW 20 MIN: CPT | Performed by: FAMILY MEDICINE

## 2018-02-12 PROCEDURE — 87086 URINE CULTURE/COLONY COUNT: CPT | Performed by: FAMILY MEDICINE

## 2018-02-12 RX ORDER — CEPHALEXIN 500 MG/1
500 CAPSULE ORAL 3 TIMES DAILY
Qty: 30 CAPSULE | Refills: 0 | Status: SHIPPED | OUTPATIENT
Start: 2018-02-12 | End: 2018-04-02

## 2018-02-12 RX ORDER — SUCRALFATE 1 G/1
1 TABLET ORAL PRN
Refills: 1 | COMMUNITY
Start: 2017-08-22 | End: 2018-04-20

## 2018-02-12 RX ORDER — ESTROGENS, ESTERIFIED AND METHYLTESTOSTERONE 2.5; 1.25 MG/1; MG/1
1.25 TABLET, COATED ORAL DAILY
Refills: 3 | COMMUNITY
Start: 2018-01-29 | End: 2018-09-24

## 2018-02-12 RX ORDER — ACYCLOVIR 400 MG/1
400 TABLET ORAL 2 TIMES DAILY
Refills: 3 | COMMUNITY
Start: 2017-12-01 | End: 2018-08-28

## 2018-02-12 RX ORDER — TRIFLURIDINE 10 MG/ML
1 SOLUTION OPHTHALMIC PRN
Refills: 0 | COMMUNITY
Start: 2017-09-14 | End: 2024-01-11

## 2018-02-12 ASSESSMENT — ENCOUNTER SYMPTOMS
FREQUENCY: 1
NAUSEA: 0
HEMATURIA: 0
FLANK PAIN: 0
CHILLS: 0
VOMITING: 0
DYSURIA: 1
FEVER: 0

## 2018-02-12 ASSESSMENT — PAIN SCALES - GENERAL: PAINLEVEL: MODERATE PAIN (4)

## 2018-02-12 NOTE — MR AVS SNAPSHOT
"              After Visit Summary   2018    Miguelina Estrada    MRN: [id number]           Patient Information     Date Of Birth          1955        Visit Information        Provider Department      2018 9:30 AM Fady Oconnor MD [facility]        Today's Diagnoses     Urinary frequency    -  1       Follow-ups after your visit        Follow-up notes from your care team     Return if symptoms worsen or fail to improve.      Who to contact     If you have questions or need follow up information about today's clinic visit or your schedule please contact [facility] AND \[facility]\"" directly at [phone].  Normal or non-critical lab and imaging results will be communicated to you by Noblivityhart, letter or phone within 4 business days after the clinic has received the results. If you do not hear from us within 7 days, please contact the clinic through Noblivityhart or phone. If you have a critical or abnormal lab result, we will notify you by phone as soon as possible.  Submit refill requests through Social Median or call your pharmacy and they will forward the refill request to us. Please allow 3 business days for your refill to be completed.          Additional Information About Your Visit        MyChart Information     Social Median lets you send messages to your doctor, view your test results, renew your prescriptions, schedule appointments and more. To sign up, go to [email or web address] . Click on \"Log in\" on the left side of the screen, which will take you to the Welcome page. Then click on \"Sign up Now\" on the right side of the page.     You will be asked to enter the access code listed below, as well as some personal information. Please follow the directions to create your username and password.     Your access code is: [email or web address]  Expires: 2018 10:23 AM     Your access code will  in 90 days. If you need help or a new code, please call your [place] clinic or " [phone].        Care EveryWhere ID     This is your Care EveryWhere ID. This could be used by other organizations to access your [facility] medical records  [id number]        Your Vitals Were     Pulse Temperature Breastfeeding? BMI (Body Mass Index)          64 98.7  F (37.1  C) No 24.55 kg/m2         Blood Pressure from Last 3 Encounters:   02/12/18 138/72   09/14/17 120/64   08/22/17 130/60    Weight from Last 3 Encounters:   02/12/18 143 lb (64.9 kg)   09/14/17 138 lb 12.8 oz (63 kg)   08/22/17 139 lb 2 oz (63.1 kg)              We Performed the Following     UA reflex to Microscopic     Urine Culture Aerobic Bacterial          Today's Medication Changes          These changes are accurate as of 2/12/18 10:23 AM.  If you have any questions, ask your nurse or doctor.               Start taking these medicines.        Dose/Directions    cephALEXin 500 MG capsule   Commonly known as:  KEFLEX   Used for:  Urinary frequency   Started by:  Fady Oconnor MD        Dose:  500 mg   Take 1 capsule (500 mg) by mouth 3 times daily   Quantity:  30 capsule   Refills:  0            Where to get your medicines      These medications were sent to [id number] - [place], [place] - [address]  [address], [facility] [address]     Phone:  [phone]     cephALEXin 500 MG capsule                Primary Care Provider Office Phone # Fax #    Fady Oconnor [phone]       [address] [facility] [address]        Equal Access to Services     TRUDY GRAHAM AH: Hadii tammie shero Sopaulette, waaxda luqadaha, qaybta kaalmada adeegyada, waxGulfport Behavioral Health Systemasiya leavitt. So [facility] [phone].    ATENCIÓN: Si habla español, tiene a coates disposición servicios gratuitos de asistencia lingüística. Llame al [phone].    We comply with applicable federal civil rights laws and [place] laws. We do not discriminate on the basis of race, color, national  origin, age, disability, sex, sexual orientation, or gender identity.            Thank you!     Thank you for choosing [facility] AND [facility]  for your care. Our goal is always to provide you with excellent care. Hearing back from our patients is one way we can continue to improve our services. Please take a few minutes to complete the written survey that you may receive in the mail after your visit with us. Thank you!             Your Updated Medication List - Protect others around you: Learn how to safely use, store and throw away your medicines at www.disposemymeds.org.          This list is accurate as of 2/12/18 10:23 AM.  Always use your most recent med list.                   Brand Name Dispense Instructions for use Diagnosis    acyclovir 400 MG tablet    ZOVIRAX     Take 400 mg by mouth 2 times daily        cephALEXin 500 MG capsule    KEFLEX    30 capsule    Take 1 capsule (500 mg) by mouth 3 times daily    Urinary frequency       EEMT 1.25-2.5 MG Tabs      Take 1.25 mg by mouth daily        RA L-LYSINE 1000 MG Tabs   Generic drug:  Lysine HCl      Take 1,000 mg by mouth daily        sucralfate 1 GM tablet    CARAFATE     Take 1 g by mouth as needed        trifluridine 1 % ophthalmic solution    VIROPTIC     Place 1 drop into both eyes as needed

## 2018-02-12 NOTE — PROGRESS NOTES
HPI Comments: Urinary urgency.  Is worried about either an infection or a stone.  Initially 3 days ago had spasms and urgency.  No dysuria.  Pushed the water.  Slept poorly due to the discomfort.  Last night was up hourly.  No fevers.  No blood in urine.  Has similar issues 30 years ago, which was a stone.    UTI   Pertinent negatives include no chills, fever, nausea or vomiting.         Review of Systems   Constitutional: Negative for chills and fever.   Gastrointestinal: Negative for nausea and vomiting.   Genitourinary: Positive for dysuria, frequency and urgency. Negative for flank pain and hematuria.     Current Outpatient Prescriptions   Medication     cephALEXin (KEFLEX) 500 MG capsule     acyclovir (ZOVIRAX) 400 MG tablet     Est Estrogens-Methyltest (EEMT) 1.25-2.5 MG TABS     Lysine HCl (RA L-LYSINE) 1000 MG TABS     sucralfate (CARAFATE) 1 GM tablet     trifluridine (VIROPTIC) 1 % ophthalmic solution     No current facility-administered medications for this visit.      Past Medical History:   Diagnosis Date     Asymptomatic premature menopause     mid 30s     Cardiac arrhythmia     No Comments Provided     Diaphragmatic hernia without obstruction or gangrene     No Comments Provided     Hypoglycemia     episodes of hypoglycemia if not eating     Pupillary abnormality of both eyes     1993,Palmira's tonic pupil (bilateral)         Physical Exam   Constitutional: She is oriented to person, place, and time and well-developed, well-nourished, and in no distress. No distress.   Abdominal: Soft. Bowel sounds are normal. She exhibits no distension. There is tenderness. There is no rebound.   Mile suprapubic pain on palpation   Neurological: She is alert and oriented to person, place, and time.   Skin: She is not diaphoretic.   Psychiatric: Memory, affect and judgment normal.     Results for orders placed or performed in visit on 02/12/18   UA reflex to Microscopic   Result Value Ref Range    Color Urine Yellow      "Appearance Urine Clear     Glucose Urine Negative NEG^Negative mg/dL    Bilirubin Urine Negative NEG^Negative    Ketones Urine Negative NEG^Negative mg/dL    Specific Gravity Urine <1.005 1.003 - 1.035    Blood Urine Negative NEG^Negative    pH Urine 7.0 5.0 - 7.0 pH    Protein Albumin Urine Negative NEG^Negative mg/dL    Urobilinogen Urine 0.2 0.2 - 1.0 EU/dL    Nitrite Urine Negative NEG^Negative    Leukocyte Esterase Urine Negative NEG^Negative    Source Unspecified Urine      A/P    (R35.0) Urinary frequency  (primary encounter diagnosis)  Comment: new  Plan: UA reflex to Microscopic, Urine Culture Aerobic        Bacterial, cephALEXin (KEFLEX) 500 MG capsule        I suspect this is an infection, and urine is too dilute to get an accurate UA.  She wants to take keflex, and nothing \"too strong\".  Will start with this, realizing her many allergies, while waiting for the culture results.    Fady Oconnor MD          "

## 2018-02-13 NOTE — TELEPHONE ENCOUNTER
Patient Information     Patient Name MRMiguelina Myers [id number] Female 1955      Telephone Encounter by Marcy Sabillon at 2018  2:29 PM     Author:  Marcy Sabillon Service:  (none) Author Type:  (none)     Filed:  2018  2:34 PM Encounter Date:  2018 Status:  Signed     :  Marcy Sabillon            Please fax for refill.  Okay to wait for your return.  Marcy Sabillon LPN..........2018  2:34 PM

## 2018-02-13 NOTE — TELEPHONE ENCOUNTER
Patient Information     Patient Name MRMiguelina Myers [id number] Female 1955      Telephone Encounter by Bekah Hatfield RN at 2018 12:04 PM     Author:  Bekah Hatfield RN Service:  (none) Author Type:  NURS- Registered Nurse     Filed:  2018 12:13 PM Encounter Date:  2018 Status:  Signed     :  Bekah Hatfield RN (NURS- Registered Nurse)            Pharmacy is requesting fill of EEMT which was filled on 17 for #90 X 3 refills (a full year).  This is not due for refill yet. Refused as too soon.   Unable to complete prescription refill per RN Medication Refill Policy.................... Bekah Hatfield RN ....................  2018   12:12 PM

## 2018-02-13 NOTE — TELEPHONE ENCOUNTER
Patient Information     Patient Name MRN Miguelina Foss [id number] Female 1955      Telephone Encounter by Shannan Morrison at 2018  8:58 AM     Author:  Shannan Morrison Service:  (none) Author Type:  (none)     Filed:  2018  8:58 AM Encounter Date:  2018 Status:  Signed     :  Shannan Morrison            Called Pt  Shannan Morrison ....................  2018   8:58 AM

## 2018-02-14 ENCOUNTER — TELEPHONE (OUTPATIENT)
Dept: FAMILY MEDICINE | Facility: OTHER | Age: 63
End: 2018-02-14

## 2018-02-14 LAB
BACTERIA SPEC CULT: NO GROWTH
SPECIMEN SOURCE: NORMAL

## 2018-02-18 ENCOUNTER — DOCUMENTATION ONLY (OUTPATIENT)
Dept: FAMILY MEDICINE | Facility: OTHER | Age: 63
End: 2018-02-18

## 2018-02-18 PROBLEM — B02.30 OPHTHALMIC HERPES ZOSTER: Status: ACTIVE | Noted: 2017-09-14

## 2018-02-19 ENCOUNTER — HEALTH MAINTENANCE LETTER (OUTPATIENT)
Age: 63
End: 2018-02-19

## 2018-02-19 ENCOUNTER — DOCUMENTATION ONLY (OUTPATIENT)
Dept: FAMILY MEDICINE | Facility: OTHER | Age: 63
End: 2018-02-19

## 2018-02-22 ENCOUNTER — TELEPHONE (OUTPATIENT)
Dept: FAMILY MEDICINE | Facility: OTHER | Age: 63
End: 2018-02-22

## 2018-02-22 DIAGNOSIS — N20.0 CALCULUS OF KIDNEY: Primary | ICD-10-CM

## 2018-02-22 NOTE — TELEPHONE ENCOUNTER
Spoke with patient after she confirmed her last name and birth date. Patient stated she was seen last week for kidney stones or possible UTI. Patient stated she was told if they had not gotten better with the completed then she should pursue having a CT scan. Patient stated the problem is still there and is wondering if she should have the CT scan or if she should be meeting with  for a consultation in regards to these concerns. Please advise  Katya Patel LPN 2/22/2018 3:35 PM

## 2018-02-22 NOTE — TELEPHONE ENCOUNTER
Spoke with patient after verifying last name and birth date, informed of note below.  Katya Patel LPN 2/22/2018 4:16 PM

## 2018-02-22 NOTE — TELEPHONE ENCOUNTER
I would start with the CT scan, then we can figure out if Dr. Gray is the next appropriate step.  Fady Oconnor MD on 2/22/2018 at 3:52 PM

## 2018-03-01 ENCOUNTER — HOSPITAL ENCOUNTER (OUTPATIENT)
Dept: CT IMAGING | Facility: OTHER | Age: 63
Discharge: HOME | End: 2018-03-01
Attending: FAMILY MEDICINE | Admitting: FAMILY MEDICINE
Payer: COMMERCIAL

## 2018-03-01 DIAGNOSIS — N20.0 CALCULUS OF KIDNEY: ICD-10-CM

## 2018-03-01 PROCEDURE — 74176 CT ABD & PELVIS W/O CONTRAST: CPT

## 2018-04-02 ENCOUNTER — OFFICE VISIT (OUTPATIENT)
Dept: UROLOGY | Facility: OTHER | Age: 63
End: 2018-04-02
Attending: UROLOGY
Payer: COMMERCIAL

## 2018-04-02 VITALS
HEIGHT: 64 IN | BODY MASS INDEX: 24.69 KG/M2 | SYSTOLIC BLOOD PRESSURE: 128 MMHG | RESPIRATION RATE: 12 BRPM | WEIGHT: 144.6 LBS | DIASTOLIC BLOOD PRESSURE: 64 MMHG | HEART RATE: 80 BPM

## 2018-04-02 DIAGNOSIS — N20.0 CALCULUS OF LEFT KIDNEY: Primary | ICD-10-CM

## 2018-04-02 PROCEDURE — 99203 OFFICE O/P NEW LOW 30 MIN: CPT | Performed by: UROLOGY

## 2018-04-02 ASSESSMENT — PAIN SCALES - GENERAL: PAINLEVEL: NO PAIN (0)

## 2018-04-02 NOTE — MR AVS SNAPSHOT
"              After Visit Summary   2018    Miguelina Estrada    MRN: [id number]           Patient Information     Date Of Birth          1955        Visit Information        Provider Department      2018 10:30 AM Levi Gray MD [facility]        Today's Diagnoses     Calculus of left kidney    -  1       Follow-ups after your visit        Who to contact     If you have questions or need follow up information about today's clinic visit or your schedule please contact [facility] directly at [phone].  Normal or non-critical lab and imaging results will be communicated to you by PO-MOhart, letter or phone within 4 business days after the clinic has received the results. If you do not hear from us within 7 days, please contact the clinic through Fanztert or phone. If you have a critical or abnormal lab result, we will notify you by phone as soon as possible.  Submit refill requests through Mindscape or call your pharmacy and they will forward the refill request to us. Please allow 3 business days for your refill to be completed.          Additional Information About Your Visit        MyChart Information     Mindscape lets you send messages to your doctor, view your test results, renew your prescriptions, schedule appointments and more. To sign up, go to [email or web address] . Click on \"Log in\" on the left side of the screen, which will take you to the Welcome page. Then click on \"Sign up Now\" on the right side of the page.     You will be asked to enter the access code listed below, as well as some personal information. Please follow the directions to create your username and password.     Your access code is: [email or web address]  Expires: 2018 11:23 AM     Your access code will  in 90 days. If you need help or a new code, please call your [place] clinic or [phone].        Care EveryWhere ID     This is your Care EveryWhere ID. This could be used " "by other organizations to access your [facility] medical records  [id number]        Your Vitals Were     Pulse Respirations Height BMI (Body Mass Index)          80 12 1.626 m (5' 4\") 24.82 kg/m2         Blood Pressure from Last 3 Encounters:   04/02/18 128/64   02/12/18 138/72   09/14/17 120/64    Weight from Last 3 Encounters:   04/02/18 65.6 kg (144 lb 9.6 oz)   02/12/18 64.9 kg (143 lb)   09/14/17 63 kg (138 lb 12.8 oz)              Today, you had the following     No orders found for display       Primary Care Provider Office Phone # Fax #    Fady Oconnor [phone]       [address] [facility] [address]        Equal Access to Services     [facility] HospitalJING : Hadii tammie glass Sopaulette, waaxda luqadaha, qaybta kaalmada manolo, leida andesron . So [facility] [phone].    ATENCIÓN: Si habla español, tiene a coates disposición servicios gratuitos de asistencia lingüística. Scott al [phone].    We comply with applicable federal civil rights laws and [place] laws. We do not discriminate on the basis of race, color, national origin, age, disability, sex, sexual orientation, or gender identity.            Thank you!     Thank you for choosing [facility] AND [facility]  for your care. Our goal is always to provide you with excellent care. Hearing back from our patients is one way we can continue to improve our services. Please take a few minutes to complete the written survey that you may receive in the mail after your visit with us. Thank you!             Your Updated Medication List - Protect others around you: Learn how to safely use, store and throw away your medicines at www.disposemymeds.org.          This list is accurate as of 4/2/18  1:45 PM.  Always use your most recent med list.                   Brand Name Dispense Instructions for use Diagnosis    acyclovir 400 MG tablet    ZOVIRAX     Take 400 mg by mouth 2 times daily        EEMT 1.25-2.5 " MG Tabs      Take 1.25 mg by mouth daily        RA L-LYSINE 1000 MG Tabs   Generic drug:  Lysine HCl      Take 1,000 mg by mouth daily        sucralfate 1 GM tablet    CARAFATE     Take 1 g by mouth as needed        trifluridine 1 % ophthalmic solution    VIROPTIC     Place 1 drop into both eyes as needed

## 2018-04-02 NOTE — NURSING NOTE
Review of Systems:    Weight loss:    No     Recent fever/chills:  No   Night sweats:   No  Current skin rash:  No   Recent hair loss:  No  Heat intolerance:  No   Cold intolerance:  No  Chest pain:   No   Palpitations:   No  Shortness of breath:  No   Wheezing:   No  Constipation:    No   Diarrhea:   No   Nausea:   No   Vomiting:   No   Kidney/side pain:  Yes   Back pain:   No  Frequent headaches:  No   Dizziness:     No  Leg swelling:   No   Calf pain:    No    Parents, brothers or sisters with history of kidney cancer:   No  Parents, brothers or sisters with history of bladder cancer: No

## 2018-04-02 NOTE — PROGRESS NOTES
Type of Visit  NPV    Chief Complaint  Kidney stone    HPI  Ms. Estrada is a 62 year old female with history kidney stones who presents with left kidney stone.  The patient initially presented to the ED 1 month ago.  Presentation symptoms were right flank pain.  At that time the patient underwent a CT scan which revealed a 2 mm nonobstructing left kidney stone.  The patient currently denies fevers or chills.  The patient currently denies nausea or vomiting.      Past Medical History  She  has a past medical history of Asymptomatic premature menopause; Cardiac arrhythmia; Diaphragmatic hernia without obstruction or gangrene; Hypoglycemia; and Pupillary abnormality of both eyes.  Patient Active Problem List   Diagnosis     Chronic GERD     Ophthalmic herpes zoster     Premature menopause on HRT     Rosacea       Past Surgical History  She  has a past surgical history that includes Hysterectomy total abdominal, bilateral salpingo-oophorectomy, combined; Laparoscopic cholecystectomy; Laparoscopic tubal ligation; Esophagoscopy, gastroscopy, duodenoscopy (EGD), combined; and Endoscopy upper, colonoscopy, combined.    Medications  She has a current medication list which includes the following prescription(s): acyclovir, eemt, lysine hcl, sucralfate, and trifluridine.    Allergies  Allergies   Allergen Reactions     Erythromycin Nausea and Vomiting     Levofloxacin Other (See Comments) and Palpitations     Penicillins Rash     Sulfa Drugs Rash       Social History  She  reports that she has never smoked. She has never used smokeless tobacco. She reports that she does not drink alcohol.  No drug abuse.    Family History  Family History   Problem Relation Age of Onset     DIABETES Father 60     Diabetes     Other - See Comments Father 55     Atrial fibrillation     Hypertension Mother      Hypertension     HEART DISEASE Mother 86     Heart Disease,silent MI, CHF     Hypertension Brother      Hypertension     Breast Cancer  "No family hx of      Cancer-breast       Review of Systems  I personally reviewed the ROS with the patient.    Nursing Notes:   Marianna Martines RN  4/2/2018 10:36 AM  Signed  Review of Systems:    Weight loss:    No     Recent fever/chills:  No   Night sweats:   No  Current skin rash:  No   Recent hair loss:  No  Heat intolerance:  No   Cold intolerance:  No  Chest pain:   No   Palpitations:   No  Shortness of breath:  No   Wheezing:   No  Constipation:    No   Diarrhea:   No   Nausea:   No   Vomiting:   No   Kidney/side pain:  Yes   Back pain:   No  Frequent headaches:  No   Dizziness:     No  Leg swelling:   No   Calf pain:    No    Parents, brothers or sisters with history of kidney cancer:   No  Parents, brothers or sisters with history of bladder cancer: No    Physical Exam  Vitals:    04/02/18 1033   BP: 128/64   BP Location: Right arm   Patient Position: Sitting   Cuff Size: Adult Regular   Pulse: 80   Resp: 12   Weight: 65.6 kg (144 lb 9.6 oz)   Height: 1.626 m (5' 4\")     Constitutional: NAD, WDWN  Head: NCAT  Eyes: Conjunctivae normal  Cardiovascular: Regular rate  Pulmonary/Chest: Respirations are even and non-labored bilaterally  Abdominal: Soft with no distension, tenderness, masses, guarding.    - left CVA tenderness    - right CVA tenderness  Neurological: A + O x 3,  cranial nerves II-XII grossly intact  Extremities: SOY x 4, warm, no clubbing, no cyanosis  Skin: Pink, warm, dry with no rash  Psychiatric:  Normal mood and affect  Genitourinary: Nonpalpable bladder    Labs  Results for orders placed or performed during the hospital encounter of 03/01/18   CT Abdomen Pelvis w/o Contrast    Narrative    PROCEDURE:  CT ABDOMEN PELVIS W/O CONTRAST    HISTORY:  Stone protocol, hx stones, similar symptoms now.; Calculus  of kidney    TECHNIQUE:  Helical CT of the abdomen and pelvis was performed without  intravenous contrast.    COMPARISON:  None.    FINDINGS:      Evaluation of the solid organs is " somewhat limited due to the lack of  intravenous contrast.    Limited views through the lung bases show no focal consolidation or  intrapulmonary mass. The heart size is normal. No pericardial or  pleural effusions are seen.    The liver is normal in size and attenuation.  The gallbladder is  present.  The spleen, pancreas and adrenal glands are unremarkable.   There is no evidence of hydronephrosis. A punctate 2 mm nonobstructive  calculus is present within the left kidney on axial image 51. Slight  ureteral fullness likely relates to bladder distention. No distal  ureteral calculi are identified. Multiple phleboliths are present.  There is no abdominal aortic aneurysm.      The bowel is normal in caliber. The appendix is unremarkable.    No free fluid, free air or adenopathy is present.  No suspicious  osseous lesions are identified.      Impression    IMPRESSION:      2 mm nonobstructive left renal calculus. No hydronephrosis.    AUSTIN PILLAI MD       Imaging  I personally reviewed and interpreted the images and report.  CT a/p   3/1/2018  IMPRESSION:    2 mm nonobstructive left renal calculus. No hydronephrosis.    Assessment  Ms. Estrada is a 62 year old female who presents with nonobstructing left kidney stone and resolved right flank pain.  Etiology for the flank pain is unknown.  She does have a phlebolith near the bladder on the right.  It is possible that could be a ureteral stone however I felt I was able to follow her ureter fairly well to the bladder.  In any event the flank pain has improved.  Will proceed with observation of small left nonobstructing stone    Plan  Discussed stone prevention -increase fluid intake, low-salt, increased citrate and moderate protein intake  Follow-up with me as needed

## 2018-04-20 ENCOUNTER — HOSPITAL ENCOUNTER (OUTPATIENT)
Dept: GENERAL RADIOLOGY | Facility: OTHER | Age: 63
Discharge: HOME | End: 2018-04-20
Attending: NURSE PRACTITIONER | Admitting: NURSE PRACTITIONER
Payer: COMMERCIAL

## 2018-04-20 ENCOUNTER — OFFICE VISIT (OUTPATIENT)
Dept: FAMILY MEDICINE | Facility: OTHER | Age: 63
End: 2018-04-20
Attending: NURSE PRACTITIONER
Payer: COMMERCIAL

## 2018-04-20 ENCOUNTER — NURSE TRIAGE (OUTPATIENT)
Dept: FAMILY MEDICINE | Facility: OTHER | Age: 63
End: 2018-04-20

## 2018-04-20 ENCOUNTER — TELEPHONE (OUTPATIENT)
Dept: FAMILY MEDICINE | Facility: OTHER | Age: 63
End: 2018-04-20

## 2018-04-20 VITALS
BODY MASS INDEX: 24.24 KG/M2 | HEART RATE: 78 BPM | OXYGEN SATURATION: 99 % | SYSTOLIC BLOOD PRESSURE: 128 MMHG | DIASTOLIC BLOOD PRESSURE: 72 MMHG | WEIGHT: 141.2 LBS | RESPIRATION RATE: 16 BRPM

## 2018-04-20 DIAGNOSIS — R00.2 PALPITATIONS: Primary | ICD-10-CM

## 2018-04-20 DIAGNOSIS — K21.9 CHRONIC GERD: ICD-10-CM

## 2018-04-20 LAB
ALBUMIN SERPL-MCNC: 4.5 G/DL (ref 3.5–5.7)
ALP SERPL-CCNC: 47 U/L (ref 34–104)
ALT SERPL W P-5'-P-CCNC: 15 U/L (ref 7–52)
ANION GAP SERPL CALCULATED.3IONS-SCNC: 8 MMOL/L (ref 3–14)
AST SERPL W P-5'-P-CCNC: 19 U/L (ref 13–39)
BASOPHILS # BLD AUTO: 0.1 10E9/L (ref 0–0.2)
BASOPHILS NFR BLD AUTO: 1.1 %
BILIRUB SERPL-MCNC: 0.7 MG/DL (ref 0.3–1)
BUN SERPL-MCNC: 11 MG/DL (ref 7–25)
CALCIUM SERPL-MCNC: 9.9 MG/DL (ref 8.6–10.3)
CHLORIDE SERPL-SCNC: 103 MMOL/L (ref 98–107)
CO2 SERPL-SCNC: 28 MMOL/L (ref 21–31)
CREAT SERPL-MCNC: 0.78 MG/DL (ref 0.6–1.2)
DIFFERENTIAL METHOD BLD: NORMAL
EOSINOPHIL # BLD AUTO: 0 10E9/L (ref 0–0.7)
EOSINOPHIL NFR BLD AUTO: 0.5 %
ERYTHROCYTE [DISTWIDTH] IN BLOOD BY AUTOMATED COUNT: 12.6 % (ref 10–15)
GFR SERPL CREATININE-BSD FRML MDRD: 75 ML/MIN/1.7M2
GLUCOSE SERPL-MCNC: 88 MG/DL (ref 70–105)
HCT VFR BLD AUTO: 43.2 % (ref 35–47)
HGB BLD-MCNC: 15.1 G/DL (ref 11.7–15.7)
IMM GRANULOCYTES # BLD: 0 10E9/L (ref 0–0.4)
IMM GRANULOCYTES NFR BLD: 0.2 %
LYMPHOCYTES # BLD AUTO: 1.6 10E9/L (ref 0.8–5.3)
LYMPHOCYTES NFR BLD AUTO: 28.6 %
MCH RBC QN AUTO: 32.1 PG (ref 26.5–33)
MCHC RBC AUTO-ENTMCNC: 35 G/DL (ref 31.5–36.5)
MCV RBC AUTO: 92 FL (ref 78–100)
MONOCYTES # BLD AUTO: 0.5 10E9/L (ref 0–1.3)
MONOCYTES NFR BLD AUTO: 8.6 %
NEUTROPHILS # BLD AUTO: 3.4 10E9/L (ref 1.6–8.3)
NEUTROPHILS NFR BLD AUTO: 61 %
PLATELET # BLD AUTO: 270 10E9/L (ref 150–450)
POTASSIUM SERPL-SCNC: 4.1 MMOL/L (ref 3.5–5.1)
PROT SERPL-MCNC: 7.4 G/DL (ref 6.4–8.9)
RBC # BLD AUTO: 4.7 10E12/L (ref 3.8–5.2)
SODIUM SERPL-SCNC: 139 MMOL/L (ref 134–144)
TROPONIN I SERPL-MCNC: <0.03 UG/L (ref 0–0.03)
TSH SERPL DL<=0.05 MIU/L-ACNC: 4.18 IU/ML (ref 0.34–5.6)
WBC # BLD AUTO: 5.6 10E9/L (ref 4–11)

## 2018-04-20 PROCEDURE — 84484 ASSAY OF TROPONIN QUANT: CPT | Performed by: NURSE PRACTITIONER

## 2018-04-20 PROCEDURE — 85025 COMPLETE CBC W/AUTO DIFF WBC: CPT | Performed by: NURSE PRACTITIONER

## 2018-04-20 PROCEDURE — 36415 COLL VENOUS BLD VENIPUNCTURE: CPT | Performed by: NURSE PRACTITIONER

## 2018-04-20 PROCEDURE — 80053 COMPREHEN METABOLIC PANEL: CPT | Performed by: NURSE PRACTITIONER

## 2018-04-20 PROCEDURE — 93000 ELECTROCARDIOGRAM COMPLETE: CPT | Performed by: INTERNAL MEDICINE

## 2018-04-20 PROCEDURE — 71046 X-RAY EXAM CHEST 2 VIEWS: CPT

## 2018-04-20 PROCEDURE — 84443 ASSAY THYROID STIM HORMONE: CPT | Performed by: NURSE PRACTITIONER

## 2018-04-20 PROCEDURE — 99214 OFFICE O/P EST MOD 30 MIN: CPT | Mod: 25 | Performed by: NURSE PRACTITIONER

## 2018-04-20 RX ORDER — SUCRALFATE 1 G/1
1 TABLET ORAL 4 TIMES DAILY PRN
Qty: 120 TABLET | Refills: 3 | Status: SHIPPED | OUTPATIENT
Start: 2018-04-20 | End: 2020-08-05

## 2018-04-20 ASSESSMENT — ENCOUNTER SYMPTOMS
PALPITATIONS: 1
HEARTBURN: 1
ABDOMINAL PAIN: 1

## 2018-04-20 NOTE — MR AVS SNAPSHOT
After Visit Summary   4/20/2018    Miguelina Estrada    MRN: [id number]           Patient Information     Date Of Birth          1955        Visit Information        Provider Department      4/20/2018 11:30 AM Lola Copeland APRN CNP [facility]        Today's Diagnoses     Palpitations    -  1    Chronic GERD          Care Instructions    You will get apt for ZIO patch    Re start sucralfate for a month to control heartburn    If any episodes that worsen or chest pain or cardiac symptoms go to ED    Will notify you when zio patch results and you can followup Dr Oconnor next week or after Zio patch results              Follow-ups after your visit        Follow-up notes from your care team     Return if symptoms worsen or fail to improve.      Future tests that were ordered for you today     Open Future Orders        Priority Expected Expires Ordered    Zio Patch Holter Routine  6/4/2018 4/20/2018            Who to contact     If you have questions or need follow up information about today's clinic visit or your schedule please contact [facility] directly at [phone].  Normal or non-critical lab and imaging results will be communicated to you by Zalicushart, letter or phone within 4 business days after the clinic has received the results. If you do not hear from us within 7 days, please contact the clinic through Zalicushart or phone. If you have a critical or abnormal lab result, we will notify you by phone as soon as possible.  Submit refill requests through Taglocity or call your pharmacy and they will forward the refill request to us. Please allow 3 business days for your refill to be completed.          Additional Information About Your Visit        Zalicushart Information     Taglocity lets you send messages to your doctor, view your test results, renew your prescriptions, schedule appointments and more. To sign up, go to [email or web address] . Click on  "\"Log in\" on the left side of the screen, which will take you to the Welcome page. Then click on \"Sign up Now\" on the right side of the page.     You will be asked to enter the access code listed below, as well as some personal information. Please follow the directions to create your username and password.     Your access code is: [email or web address]  Expires: 2018 11:23 AM     Your access code will  in 90 days. If you need help or a new code, please call your [place] clinic or [phone].        Care EveryWhere ID     This is your Care EveryWhere ID. This could be used by other organizations to access your [place] medical records  [id number]        Your Vitals Were     Pulse Respirations Pulse Oximetry BMI (Body Mass Index)          78 16 99% 24.24 kg/m2         Blood Pressure from Last 3 Encounters:   18 128/72   18 128/64   18 138/72    Weight from Last 3 Encounters:   18 141 lb 3.2 oz (64 kg)   18 144 lb 9.6 oz (65.6 kg)   18 143 lb (64.9 kg)              We Performed the Following     CBC with platelets and differential     Comprehensive metabolic panel (BMP + Alb, Alk Phos, ALT, AST, Total. Bili, TP)     EKG 12-lead, tracing only     Thyrotropin GH     Troponin I     XR Chest 2 Views          Today's Medication Changes          These changes are accurate as of 18  1:09 PM.  If you have any questions, ask your nurse or doctor.               These medicines have changed or have updated prescriptions.        Dose/Directions    sucralfate 1 GM tablet   Commonly known as:  CARAFATE   This may have changed:  when to take this   Used for:  Chronic GERD   Changed by:  Lola Copeland APRN CNP        Dose:  1 g   Take 1 tablet (1 g) by mouth 4 times daily as needed   Quantity:  120 tablet   Refills:  3            Where to get your medicines      These medications were sent to [facility] - [place], [place] - [address], [facility] [address]     Phone:  [phone]     sucralfate 1 GM tablet                Primary Care Provider Office Phone # Fax #    Fady Oconnor [phone]       [address]  [facility] [address]        Equal Access to Services     WILL GRAHAM : Hadii aad ku hadasho Soomaali, waaxda luqadaha, qaybta kaalmada adeegyada, leida canaleswandamae leavitt. So [facility] [phone].    ATENCIÓN: Si habla español, tiene a coates disposición servicios gratuitos de asistencia lingüística. [facility] [phone].    We comply with applicable federal civil rights laws and [place] laws. We do not discriminate on the basis of race, color, national origin, age, disability, sex, sexual orientation, or gender identity.            Thank you!     Thank you for choosing [facility] AND [facility]  for your care. Our goal is always to provide you with excellent care. Hearing back from our patients is one way we can continue to improve our services. Please take a few minutes to complete the written survey that you may receive in the mail after your visit with us. Thank you!             Your Updated Medication List - Protect others around you: Learn how to safely use, store and throw away your medicines at www.disposemymeds.org.          This list is accurate as of 4/20/18  1:09 PM.  Always use your most recent med list.                   Brand Name Dispense Instructions for use Diagnosis    acyclovir 400 MG tablet    ZOVIRAX     Take 400 mg by mouth 2 times daily        EEMT 1.25-2.5 MG Tabs      Take 1.25 mg by mouth daily        RA L-LYSINE 1000 MG Tabs   Generic drug:  Lysine HCl      Take 1,000 mg by mouth daily        sucralfate 1 GM tablet    CARAFATE    120 tablet    Take 1 tablet (1 g) by mouth 4 times daily as needed    Chronic GERD       trifluridine 1 % ophthalmic solution    VIROPTIC     Place 1 drop into both eyes as needed

## 2018-04-20 NOTE — TELEPHONE ENCOUNTER
Pt states in medical history has history of arhythmia - heart palpitations,  They have become worse and woke her up last night-states they usually do EKG and possible monitor-only a short appt open and needs a long.    .Charito Burns on 4/20/2018 at 8:50 AM

## 2018-04-20 NOTE — PATIENT INSTRUCTIONS
You will get apt for ZIO patch    Re start sucralfate for a month to control heartburn    If any episodes that worsen or chest pain or cardiac symptoms go to ED    Will notify you when zio patch results and you can followup Dr Oconnor next week or after Zio patch results

## 2018-04-20 NOTE — PROGRESS NOTES
"  SUBJECTIVE:   Miguelina Estrada is a 62 year old female who presents to clinic today for the following health issues:    Presents for 3 days where she has had heart palpitations and epigastric pressure.  Mostly is happening when she is laying in bed at night, last night woke her up palpitations lasted maybe 5 or 10 minutes, she felt that she was having some irregular heartbeats.  Denies any dyspnea, diaphoresis, radiation of pain or pressure.  Reports the symptoms are localized to the epigastric and lower substernal area when onset    Concerned as her parents both have atrial fibrillation.  No known cardiac history.  No tobacco use history  Significant history of gastritis and gastropathy reactive changes on last EGD 2017, pathology and notes reviewed  Does have sucralfate to use as needed however she has not used this--- she tried an over-the-counter Maalox and it was mildly helpful    She reports she has had 2 episodes similar to this over the past 20 years when she was living in [place]--was told she was diagnosed with a benign tachycardia and was told to avoid caffeine and other stimulants  Has never been on medication.  Also remembers being hospitalized and was told that similar symptoms were caused by \"esophageal spasms\", was given a GI cocktail and resolved.    Reports today she is actually feeling better this morning not having any symptoms--was taken off omeprazole after her EGD last summer has a prescription for as needed Carafate which she has not tried yet  Recently passed a kidney stone--- may have another stone was told to drink increased water with fresh lemon juice  By urologist--has been doing this for the past week may have triggered some GERD  Denies any NSAID or aspirin use  Never had a fever    HPI    Patient Active Problem List   Diagnosis     Chronic GERD     Ophthalmic herpes zoster     Premature menopause on HRT     Rosacea     Past Surgical History:   Procedure Laterality Date     " ENDOSCOPY UPPER, COLONOSCOPY, COMBINED      8/11/2017     ESOPHAGOSCOPY, GASTROSCOPY, DUODENOSCOPY (EGD), COMBINED      2009     HYSTERECTOMY TOTAL ABDOMINAL, BILATERAL SALPINGO-OOPHORECTOMY, COMBINED      2003     LAPAROSCOPIC CHOLECYSTECTOMY      2007     LAPAROSCOPIC TUBAL LIGATION      1980s       Social History   Substance Use Topics     Smoking status: Never Smoker     Smokeless tobacco: Never Used     Alcohol use No     Family History   Problem Relation Age of Onset     DIABETES Father 60     Diabetes     Other - See Comments Father 55     Atrial fibrillation     Arrhythmia Father      Hypertension Mother      Hypertension     HEART DISEASE Mother 86     Heart Disease,silent MI, CHF     Coronary Artery Disease Mother 86     MI  Afib     Arrhythmia Mother      Hypertension Brother      Hypertension     Breast Cancer No family hx of      Cancer-breast         Current Outpatient Prescriptions   Medication Sig Dispense Refill     acyclovir (ZOVIRAX) 400 MG tablet Take 400 mg by mouth 2 times daily  3     Est Estrogens-Methyltest (EEMT) 1.25-2.5 MG TABS Take 1.25 mg by mouth daily  3     Lysine HCl (RA L-LYSINE) 1000 MG TABS Take 1,000 mg by mouth daily       sucralfate (CARAFATE) 1 GM tablet Take 1 g by mouth as needed  1     trifluridine (VIROPTIC) 1 % ophthalmic solution Place 1 drop into both eyes as needed  0     Allergies   Allergen Reactions     Erythromycin Nausea and Vomiting     Levofloxacin Other (See Comments) and Palpitations     Penicillins Rash     Sulfa Drugs Rash     BP Readings from Last 3 Encounters:   04/20/18 128/72   04/02/18 128/64   02/12/18 138/72    Wt Readings from Last 3 Encounters:   04/20/18 141 lb 3.2 oz (64 kg)   04/02/18 144 lb 9.6 oz (65.6 kg)   02/12/18 143 lb (64.9 kg)                  Labs reviewed in EPIC    Review of Systems   Cardiovascular: Positive for palpitations.   Gastrointestinal: Positive for abdominal pain and heartburn.   All other systems reviewed and are  negative.       OBJECTIVE:     /72 (BP Location: Right arm, Patient Position: Chair, Cuff Size: Adult Regular)  Pulse 78  Resp 16  Wt 141 lb 3.2 oz (64 kg)  SpO2 99%  BMI 24.24 kg/m2  Body mass index is 24.24 kg/(m^2).  Physical Exam   Constitutional: She is oriented to person, place, and time. She appears well-developed and well-nourished.   HENT:   Head: Normocephalic and atraumatic.   Mouth/Throat: Oropharynx is clear and moist.   Eyes: Conjunctivae are normal.   Neck: Normal range of motion. Neck supple. No JVD present.   Cardiovascular: Normal rate, regular rhythm, normal heart sounds and intact distal pulses.  Exam reveals no gallop and no friction rub.    No murmur heard.  Pulmonary/Chest: Effort normal and breath sounds normal.   Abdominal: Soft. Bowel sounds are normal. There is tenderness.   Mild tenderness with epigastric palpation pain rated 4 out of 10   Musculoskeletal: Normal range of motion.   Lymphadenopathy:     She has no cervical adenopathy.   Neurological: She is alert and oriented to person, place, and time.   Skin: Skin is warm and dry.   Psychiatric: She has a normal mood and affect. Her behavior is normal. Judgment and thought content normal.   Nursing note and vitals reviewed.      Results for orders placed or performed in visit on 04/20/18   XR Chest 2 Views    Narrative    PROCEDURE:  XR CHEST 2 VW    HISTORY:  ; Palpitations; Chronic GERD.     COMPARISON:  None.    FINDINGS:   The cardiac silhouette is normal in size. The pulmonary vasculature is  normal.  The lungs are clear. No pleural effusion or pneumothorax.      Impression    IMPRESSION:  No acute cardiopulmonary disease.      DERRELL BERMUDEZ MD   Troponin I   Result Value Ref Range    Troponin I ES <0.030 0.000 - 0.034 ug/L   CBC with platelets and differential   Result Value Ref Range    WBC 5.6 4.0 - 11.0 10e9/L    RBC Count 4.70 3.8 - 5.2 10e12/L    Hemoglobin 15.1 11.7 - 15.7 g/dL    Hematocrit 43.2 35.0 - 47.0 %     MCV 92 78 - 100 fl    MCH 32.1 26.5 - 33.0 pg    MCHC 35.0 31.5 - 36.5 g/dL    RDW 12.6 10.0 - 15.0 %    Platelet Count 270 150 - 450 10e9/L    Diff Method Automated Method     % Neutrophils 61.0 %    % Lymphocytes 28.6 %    % Monocytes 8.6 %    % Eosinophils 0.5 %    % Basophils 1.1 %    % Immature Granulocytes 0.2 %    Absolute Neutrophil 3.4 1.6 - 8.3 10e9/L    Absolute Lymphocytes 1.6 0.8 - 5.3 10e9/L    Absolute Monocytes 0.5 0.0 - 1.3 10e9/L    Absolute Eosinophils 0.0 0.0 - 0.7 10e9/L    Absolute Basophils 0.1 0.0 - 0.2 10e9/L    Abs Immature Granulocytes 0.0 0 - 0.4 10e9/L   Comprehensive metabolic panel (BMP + Alb, Alk Phos, ALT, AST, Total. Bili, TP)   Result Value Ref Range    Sodium 139 134 - 144 mmol/L    Potassium 4.1 3.5 - 5.1 mmol/L    Chloride 103 98 - 107 mmol/L    Carbon Dioxide 28 21 - 31 mmol/L    Anion Gap 8 3 - 14 mmol/L    Glucose 88 70 - 105 mg/dL    Urea Nitrogen 11 7 - 25 mg/dL    Creatinine 0.78 0.60 - 1.20 mg/dL    GFR Estimate 75 >60 mL/min/1.7m2    GFR Estimate If Black >90 >60 mL/min/1.7m2    Calcium 9.9 8.6 - 10.3 mg/dL    Bilirubin Total 0.7 0.3 - 1.0 mg/dL    Albumin 4.5 3.5 - 5.7 g/dL    Protein Total 7.4 6.4 - 8.9 g/dL    Alkaline Phosphatase 47 34 - 104 U/L    ALT 15 7 - 52 U/L    AST 19 13 - 39 U/L   Thyrotropin GH   Result Value Ref Range    Thyrotropin 4.18 0.34 - 5.60 IU/mL   EKG 12-lead, tracing only    Narrative    Normal EKG with a rate of 74.  No previous for comparison.  Vahid Santo MD         ASSESSMENT/PLAN:     Reviewed negative lab results, EKG, chest x-ray with patient during office visit who is not experiencing an episode at this time    Offered GI cocktail--declines    Recommend ZIO patch assessment for correlation with symptoms  Recommend avoiding GERD triggers and starting scheduled sucralfate for the next few weeks to see if this keeps under control    Follow-up with her primary on progress    Will notify of ZIO results and if further workup indicated  based on symptom progress and monitoring data    I suspect there is an anxiety component contributing--at this time appears GI related--so suspicious for GI with episode frequency mostly at at bedtime and positional      Discussed if it any time symptoms worsen and character, frequency and intensity of pain, radiation to arm or neck or cross chest, any shortness of breath or diaphoresis  Should go to ER immediately    1. Palpitations    - EKG 12-lead, tracing only  - Troponin I  - CBC with platelets and differential  - Comprehensive metabolic panel (BMP + Alb, Alk Phos, ALT, AST, Total. Bili, TP)  - Thyrotropin GH  - XR Chest 2 Views  - Zio Patch Holter; Future    2. Chronic GERD    - CBC with platelets and differential  - Comprehensive metabolic panel (BMP + Alb, Alk Phos, ALT, AST, Total. Bili, TP)  - XR Chest 2 Views  - sucralfate (CARAFATE) 1 GM tablet; Take 1 tablet (1 g) by mouth 4 times daily as needed  Dispense: 120 tablet; Refill: 3      NATALIA Lin CNP  [facility] AND [facility]

## 2018-04-20 NOTE — TELEPHONE ENCOUNTER
"Lola,  Pt state she has been experiencing \"Palpitations\" since Tuesday.  They come and go but woke her up last night, pt states she doesn't feel well today.  Bp was normal 117/72 and pt states pulse got up to 104.  Pt states she feels a chest fullness and was able to relieve that symptoms with tums.  Pt denies chest pain or shortness of breath but states she becomes anxious when she feels these palpitations.      Per protocol pt should have immediate office evaluation.  I explained to pt that ED might be appropriate as well pt stated she did not want to pay ED bill.  I spoke with Lola who stated she would see her.  Pt has appointment at 11:30 04/20/2018.  This writer will follow up as needed.  Guerda Olsen RN.............................4/20/2018 11:24 AM    Answer Assessment - Initial Assessment Questions  1. DESCRIPTION: \"Please describe your heart rate or heart beat that you are having\" (e.g., fast/slow, regular/irregular, skipped or extra beats, \"palpitations\")      \"Palpitations\"- felt since Tuesday.  Really fast and won't stop.  Woke pt last night.  Bp 117/72, pulse got up to 104  2. ONSET: \"When did it start?\" (Minutes, hours or days)       Since Tuesday  3. DURATION: \"How long does it last\" (e.g., seconds, minutes, hours)      minutes  4. PATTERN \"Does it come and go, or has it been constant since it started?\"  \"Does it get worse with exertion?\"   \"Are you feeling it now?\"      Comes and goes.  Irregular this morning, pt doesn't feel really good   5. TAP: \"Using your hand, can you tap out what you are feeling on a chair or table in front of you, so that I can hear?\" (Note: not all patients can do this)        NA  6. HEART RATE: \"Can you tell me your heart rate?\" \"How many beats in 15 seconds?\"  (Note: not all patients can do this)        104  7. RECURRENT SYMPTOM: \"Have you ever had this before?\" If so, ask: \"When was the last time?\" and \"What happened that time?\"       Pt has felt palpitations before but " "this feels different.  8. CAUSE: \"What do you think is causing the palpitations?\"     Unknown  9. CARDIAC HISTORY: \"Do you have any history of heart disease?\" (e.g., heart attack, angina, bypass surgery, angioplasty, arrhythmia)       Denies- diagnosed with a benign irregular heart beat.  No caffeine, no smoking, no drinking.    10. OTHER SYMPTOMS: \"Do you have any other symptoms?\" (e.g., dizziness, chest pain, sweating, difficulty breathing)        Chest fullness/ pressure- pain in back under shoulder blade- not new  11. PREGNANCY: \"Is there any chance you are pregnant?\" \"When was your last menstrual period?\"        NA    Protocols used: HEART RATE AND HEARTBEAT QUESTIONS-ADULT-AH    "

## 2018-04-20 NOTE — NURSING NOTE
Pt presents to clinic today for heart palpations x 4 days. Has SOB with palpation, but denies any chest pain.  Marianna Grijalva

## 2018-04-21 ASSESSMENT — PATIENT HEALTH QUESTIONNAIRE - PHQ9: SUM OF ALL RESPONSES TO PHQ QUESTIONS 1-9: 3

## 2018-04-24 ENCOUNTER — HOSPITAL ENCOUNTER (OUTPATIENT)
Dept: RESPIRATORY THERAPY | Facility: OTHER | Age: 63
Discharge: HOME | End: 2018-04-24
Attending: NURSE PRACTITIONER | Admitting: NURSE PRACTITIONER
Payer: COMMERCIAL

## 2018-04-24 DIAGNOSIS — R00.2 PALPITATIONS: ICD-10-CM

## 2018-04-24 PROCEDURE — 0296T ZIO PATCH HOLTER: CPT

## 2018-04-24 PROCEDURE — 0298T ZZC EXT ECG > 48HR TO 21 DAY REVIEW AND INTERPRETATN: CPT | Performed by: INTERNAL MEDICINE

## 2018-05-17 ENCOUNTER — OFFICE VISIT (OUTPATIENT)
Dept: FAMILY MEDICINE | Facility: OTHER | Age: 63
End: 2018-05-17
Attending: FAMILY MEDICINE
Payer: COMMERCIAL

## 2018-05-17 VITALS
SYSTOLIC BLOOD PRESSURE: 124 MMHG | BODY MASS INDEX: 24.03 KG/M2 | WEIGHT: 140 LBS | RESPIRATION RATE: 16 BRPM | DIASTOLIC BLOOD PRESSURE: 64 MMHG

## 2018-05-17 DIAGNOSIS — I48.0 PAROXYSMAL ATRIAL FIBRILLATION (H): Primary | ICD-10-CM

## 2018-05-17 PROCEDURE — 99213 OFFICE O/P EST LOW 20 MIN: CPT | Performed by: FAMILY MEDICINE

## 2018-05-17 ASSESSMENT — ENCOUNTER SYMPTOMS
DIZZINESS: 0
ACTIVITY CHANGE: 0
LIGHT-HEADEDNESS: 0
WEAKNESS: 0
ABDOMINAL PAIN: 0
CHEST TIGHTNESS: 0
FATIGUE: 0
APPETITE CHANGE: 0
UNEXPECTED WEIGHT CHANGE: 0
SHORTNESS OF BREATH: 0
PALPITATIONS: 0

## 2018-05-17 ASSESSMENT — PAIN SCALES - GENERAL: PAINLEVEL: NO PAIN (0)

## 2018-05-17 NOTE — MR AVS SNAPSHOT
"              After Visit Summary   5/17/2018    Miguelina Estrada    MRN: [id number]           Patient Information     Date Of Birth          1955        Visit Information        Provider Department      5/17/2018 11:00 AM Fady Oconnor MD [facility]        Today's Diagnoses     Paroxysmal atrial fibrillation (H)    -  1       Follow-ups after your visit        Future tests that were ordered for you today     Open Future Orders        Priority Expected Expires Ordered    Echocardiogram Complete Routine  5/17/2019 5/17/2018            Who to contact     If you have questions or need follow up information about today's clinic visit or your schedule please contact [facility] directly at [phone].  Normal or non-critical lab and imaging results will be communicated to you by Lenco Mobilehart, letter or phone within 4 business days after the clinic has received the results. If you do not hear from us within 7 days, please contact the clinic through Len[employer] Mobilehart or phone. If you have a critical or abnormal lab result, we will notify you by phone as soon as possible.  Submit refill requests through Branding Brand or call your pharmacy and they will forward the refill request to us. Please allow 3 business days for your refill to be completed.          Additional Information About Your Visit        MyChart Information     Branding Brand lets you send messages to your doctor, view your test results, renew your prescriptions, schedule appointments and more. To sign up, go to [email or web address] . Click on \"Log in\" on the left side of the screen, which will take you to the Welcome page. Then click on \"Sign up Now\" on the right side of the page.     You will be asked to enter the access code listed below, as well as some personal information. Please follow the directions to create your username and password.     Your access code is: [email or web address]  Expires: 8/15/2018 12:04 PM     Your access " code will  in 90 days. If you need help or a new code, please call your [place] clinic or [phone].        Care EveryWhere ID     This is your Care EveryWhere ID. This could be used by other organizations to access your [place] medical records  [id number]        Your Vitals Were     Respirations BMI (Body Mass Index)                16 24.03 kg/m2           Blood Pressure from Last 3 Encounters:   18 124/64   18 128/72   18 128/64    Weight from Last 3 Encounters:   18 140 lb (63.5 kg)   18 141 lb 3.2 oz (64 kg)   18 144 lb 9.6 oz (65.6 kg)                 Today's Medication Changes          These changes are accurate as of 18 12:04 PM.  If you have any questions, ask your nurse or doctor.               Start taking these medicines.        Dose/Directions    aspirin 81 MG tablet   Used for:  Paroxysmal atrial fibrillation (H)   Started by:  Fady Oconnor MD        Dose:  81 mg   Take 1 tablet (81 mg) by mouth daily   Quantity:  90 tablet   Refills:  3            Where to get your medicines      These medications were sent to [facility] - [place], [place] - [address]  [address], [facility] [address]     Phone:  [phone]     aspirin 81 MG tablet                Primary Care Provider Office Phone # Fax #    Fady Oconnor [phone]       [address] [facility] [address]        Equal Access to Services     [facility] AH: Hadii aad ku hadasho Soomaali, waaxda luqadaha, qaybta kaalmada adeegyada, leida leavitt. So [facility] [phone].    ATENCIÓN: Si habla español, tiene a coates disposición servicios gratuitos de asistencia lingüística. Llame al [phone].    We comply with applicable federal civil rights laws and [place] laws. We do not discriminate on the basis of race, color, national origin, age, disability, sex, sexual orientation, or gender  identity.            Thank you!     Thank you for choosing [facility] AND [facility]  for your care. Our goal is always to provide you with excellent care. Hearing back from our patients is one way we can continue to improve our services. Please take a few minutes to complete the written survey that you may receive in the mail after your visit with us. Thank you!             Your Updated Medication List - Protect others around you: Learn how to safely use, store and throw away your medicines at www.disposemymeds.org.          This list is accurate as of 5/17/18 12:04 PM.  Always use your most recent med list.                   Brand Name Dispense Instructions for use Diagnosis    acyclovir 400 MG tablet    ZOVIRAX     Take 400 mg by mouth 2 times daily        aspirin 81 MG tablet     90 tablet    Take 1 tablet (81 mg) by mouth daily    Paroxysmal atrial fibrillation (H)       EEMT 1.25-2.5 MG Tabs      Take 1.25 mg by mouth daily        RA L-LYSINE 1000 MG Tabs   Generic drug:  Lysine HCl      Take 1,000 mg by mouth daily        sucralfate 1 GM tablet    CARAFATE    120 tablet    Take 1 tablet (1 g) by mouth 4 times daily as needed    Chronic GERD       trifluridine 1 % ophthalmic solution    VIROPTIC     Place 1 drop into both eyes as needed

## 2018-05-17 NOTE — PROGRESS NOTES
SUBJECTIVE:   Miguelina Estrada is a 62 year old female who presents to clinic today for the following health issues: heart monitor results    HPI  Miguelina Estrada is a pleasant 62 year old female who presents today for evaluation of her Zio patch results. She was seen on 4/20/18 for heart palpitations, fatigue, and some chest discomfort. Today, she feels like her heart has been normal and she has not experienced another episode since the zio patch has been stopped. She feels fine now. States she tries to stay away from the caffeine, alcohol, and sugary sweets because this seems to make her symptoms worse. She has a heavy family history of atrial fibrillation.     Zio patch results:   Findings: The patient was monitored for 2 days and 14 hours.  Minimum heart rate 57, average heart rate 72, maximum heart rate 158.  There were 2 patient triggered events at which time nonsustained SVT was present.  There were 3 diary entries of which only one was recorded showing nonsustained SVT.  Predominant underlying rhythm was sinus.  335 supraventricular tachycardias runs occurred, the longest lasting 15 beats.  No other ectopy of significance.  These SVT episodes appear to be nonsustained atrial fibrillation.      Patient Active Problem List    Diagnosis Date Noted     Ophthalmic herpes zoster 09/14/2017     Priority: Medium     Chronic GERD 09/02/2016     Priority: Medium     Rosacea 12/07/2015     Priority: Medium     Premature menopause on HRT 05/31/2013     Priority: Medium     Past Surgical History:   Procedure Laterality Date     ENDOSCOPY UPPER, COLONOSCOPY, COMBINED      8/11/2017     ESOPHAGOSCOPY, GASTROSCOPY, DUODENOSCOPY (EGD), COMBINED      2009     HYSTERECTOMY TOTAL ABDOMINAL, BILATERAL SALPINGO-OOPHORECTOMY, COMBINED      2003     LAPAROSCOPIC CHOLECYSTECTOMY      2007     LAPAROSCOPIC TUBAL LIGATION      1980s     Social History   Substance Use Topics     Smoking status: Never Smoker     Smokeless  tobacco: Never Used     Alcohol use No     Current Outpatient Prescriptions   Medication Sig Dispense Refill     acyclovir (ZOVIRAX) 400 MG tablet Take 400 mg by mouth 2 times daily  3     aspirin 81 MG tablet Take 1 tablet (81 mg) by mouth daily 90 tablet 3     Est Estrogens-Methyltest (EEMT) 1.25-2.5 MG TABS Take 1.25 mg by mouth daily  3     Lysine HCl (RA L-LYSINE) 1000 MG TABS Take 1,000 mg by mouth daily       sucralfate (CARAFATE) 1 GM tablet Take 1 tablet (1 g) by mouth 4 times daily as needed 120 tablet 3     trifluridine (VIROPTIC) 1 % ophthalmic solution Place 1 drop into both eyes as needed  0     Allergies   Allergen Reactions     Erythromycin Nausea and Vomiting     Levofloxacin Other (See Comments) and Palpitations     Penicillins Rash     Sulfa Drugs Rash       Review of Systems   Constitutional: Negative for activity change, appetite change, fatigue and unexpected weight change.   Respiratory: Negative for chest tightness and shortness of breath.    Cardiovascular: Negative for chest pain, palpitations and peripheral edema.   Gastrointestinal: Negative for abdominal pain.   Neurological: Negative for dizziness, weakness and light-headedness.        OBJECTIVE:     /64 (BP Location: Right arm, Patient Position: Sitting, Cuff Size: Adult Regular)  Resp 16  Wt 140 lb (63.5 kg)  BMI 24.03 kg/m2  Body mass index is 24.03 kg/(m^2).  Physical Exam   Constitutional: She is oriented to person, place, and time. She appears well-developed and well-nourished. No distress.   Cardiovascular: Normal rate, regular rhythm and normal heart sounds.  Exam reveals no gallop and no friction rub.    No murmur heard.  Pulmonary/Chest: Effort normal and breath sounds normal. She has no wheezes.   Neurological: She is alert and oriented to person, place, and time.   Skin: She is not diaphoretic.   Psychiatric: She has a normal mood and affect. Her behavior is normal.     Diagnostic Test Results:  none      ASSESSMENT/PLAN:     (I48.0) Paroxysmal atrial fibrillation (H)  (primary encounter diagnosis)  Comment: New  Plan: Echocardiogram Complete, aspirin 81 MG tablet        Her CHADs-Vasc score is 1, one point for female, so an 81 mg aspirin is indicated. No need for blood thinners at this point but she will likely need them as she gets older. Discussed the need for an ECHO for a complete workup. She is agreeable to this plan.     Forwarded to Dr. Oconnor for review.  Tangela Bonner on 5/17/2018 at 1:40 PM    Pt was seen and examined by me as well as Tangela Bonner, MS 3.  See her note for details.    Fady Oconnor MD on 5/18/2018 at 9:22 AM

## 2018-05-18 PROBLEM — I48.0 PAROXYSMAL ATRIAL FIBRILLATION (H): Status: ACTIVE | Noted: 2018-05-18

## 2018-05-24 ENCOUNTER — HOSPITAL ENCOUNTER (OUTPATIENT)
Dept: CARDIOLOGY | Facility: OTHER | Age: 63
Discharge: HOME | End: 2018-05-24
Attending: FAMILY MEDICINE | Admitting: FAMILY MEDICINE
Payer: COMMERCIAL

## 2018-05-24 DIAGNOSIS — I48.0 PAROXYSMAL ATRIAL FIBRILLATION (H): ICD-10-CM

## 2018-05-24 PROCEDURE — 93306 TTE W/DOPPLER COMPLETE: CPT | Mod: 26 | Performed by: INTERNAL MEDICINE

## 2018-05-24 PROCEDURE — 93306 TTE W/DOPPLER COMPLETE: CPT

## 2018-08-05 ENCOUNTER — OFFICE VISIT (OUTPATIENT)
Dept: FAMILY MEDICINE | Facility: OTHER | Age: 63
End: 2018-08-05
Attending: NURSE PRACTITIONER
Payer: COMMERCIAL

## 2018-08-05 VITALS
WEIGHT: 141.4 LBS | HEART RATE: 80 BPM | TEMPERATURE: 99.1 F | BODY MASS INDEX: 24.27 KG/M2 | DIASTOLIC BLOOD PRESSURE: 72 MMHG | SYSTOLIC BLOOD PRESSURE: 116 MMHG
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
## 2018-08-05 DIAGNOSIS — M25.50 MULTIPLE JOINT PAIN: ICD-10-CM

## 2018-08-05 LAB — TSH SERPL DL<=0.05 MIU/L-ACNC: 4.4 IU/ML (ref 0.34–5.6)

## 2018-08-05 PROCEDURE — 99213 OFFICE O/P EST LOW 20 MIN: CPT | Performed by: NURSE PRACTITIONER

## 2018-08-05 PROCEDURE — 86666 EHRLICHIA ANTIBODY: CPT | Performed by: NURSE PRACTITIONER

## 2018-08-05 PROCEDURE — 86618 LYME DISEASE ANTIBODY: CPT | Performed by: NURSE PRACTITIONER

## 2018-08-05 PROCEDURE — 84443 ASSAY THYROID STIM HORMONE: CPT | Performed by: NURSE PRACTITIONER

## 2018-08-05 PROCEDURE — 36415 COLL VENOUS BLD VENIPUNCTURE: CPT | Performed by: NURSE PRACTITIONER

## 2018-08-05 RX ORDER — DOXYCYCLINE 100 MG/1
100 CAPSULE ORAL 2 TIMES DAILY
Qty: 28 CAPSULE | Refills: 0 | Status: SHIPPED | OUTPATIENT
Start: 2018-08-05 | End: 2018-08-19

## 2018-08-05 ASSESSMENT — PAIN SCALES - GENERAL: PAINLEVEL: SEVERE PAIN (6)

## 2018-08-05 NOTE — PROGRESS NOTES
"  SUBJECTIVE:   Miguelina Estrada is a 62 year old female who presents to clinic today for the following health issues:    Tick Bite  7/17/2018 removed deer tick which she brought in today  Prior to this she also removed a deer tick  She feels like she is getting sicker and sicker, achier and achier  Traveling joint pain: hips, hands, feet, elbows. Muscle aches that wake her up at night time.  Feels like her balance is off, foggy headed \"not thinking real good,\" sometimes feels \"so hot inside and then chilled but then temperature reads 'below normal'\" at home.   Has had headaches, denies light sensitivity.   She has been treated for lyme disease about 10 years ago when she had bullseye and correlating symptoms.        Problem list and histories reviewed & adjusted, as indicated.  Additional history: as documented    Patient Active Problem List   Diagnosis     Chronic GERD     Ophthalmic herpes zoster     Premature menopause on HRT     Rosacea     Paroxysmal atrial fibrillation (H)     Past Surgical History:   Procedure Laterality Date     ENDOSCOPY UPPER, COLONOSCOPY, COMBINED      8/11/2017     ESOPHAGOSCOPY, GASTROSCOPY, DUODENOSCOPY (EGD), COMBINED      2009     HYSTERECTOMY TOTAL ABDOMINAL, BILATERAL SALPINGO-OOPHORECTOMY, COMBINED      2003     LAPAROSCOPIC CHOLECYSTECTOMY      2007     LAPAROSCOPIC TUBAL LIGATION      1980s       Social History   Substance Use Topics     Smoking status: Never Smoker     Smokeless tobacco: Never Used     Alcohol use No     Family History   Problem Relation Age of Onset     Diabetes Father 60     Diabetes     Other - See Comments Father 55     Atrial fibrillation     Arrhythmia Father      Hypertension Mother      Hypertension     HEART DISEASE Mother 86     Heart Disease,silent MI, CHF     Coronary Artery Disease Mother 86     MI  Afib     Arrhythmia Mother      Hypertension Brother      Hypertension     Breast Cancer No family hx of      Cancer-breast         Current " Outpatient Prescriptions   Medication Sig Dispense Refill     acyclovir (ZOVIRAX) 400 MG tablet Take 400 mg by mouth 2 times daily  3     aspirin 81 MG tablet Take 1 tablet (81 mg) by mouth daily 90 tablet 3     Est Estrogens-Methyltest (EEMT) 1.25-2.5 MG TABS Take 1.25 mg by mouth daily  3     Lysine HCl (RA L-LYSINE) 1000 MG TABS Take 1,000 mg by mouth daily       trifluridine (VIROPTIC) 1 % ophthalmic solution Place 1 drop into both eyes as needed  0     sucralfate (CARAFATE) 1 GM tablet Take 1 tablet (1 g) by mouth 4 times daily as needed (Patient not taking: Reported on 8/5/2018) 120 tablet 3     Allergies   Allergen Reactions     Erythromycin Nausea and Vomiting     Levofloxacin Other (See Comments) and Palpitations     Penicillins Rash     Sulfa Drugs Rash     Recent Labs   Lab Test  04/20/18   1155  09/02/16   0948  05/31/13   1132   LDL   --   117*  150*   HDL   --   35  36*   TRIG   --   46  61   ALT  15   --    --    CR  0.78   --   0.77   GFRESTIMATED  75   --    --    GFRESTBLACK  >90   --   >60   POTASSIUM  4.1   --   4.2      BP Readings from Last 3 Encounters:   08/05/18 116/72   05/17/18 124/64   04/20/18 128/72    Wt Readings from Last 3 Encounters:   08/05/18 141 lb 6.4 oz (64.1 kg)   05/17/18 140 lb (63.5 kg)   04/20/18 141 lb 3.2 oz (64 kg)         Reviewed and updated as needed this visit by clinical staff  Tobacco  Allergies  Meds  Med Hx  Surg Hx  Fam Hx  Soc Hx      Reviewed and updated as needed this visit by Provider         ROS:    CONSTITUTIONAL: POSITIVE for feeling feverish/chilled  INTEGUMENTARY/SKIN: NEGATIVE for worrisome rashes  MUSCULOSKELETAL: POSITIVE for joint pains  NEURO: POSITIVE for headaches, foggy feeling in head    OBJECTIVE:     /72 (BP Location: Right arm, Patient Position: Sitting, Cuff Size: Adult Regular)  Pulse 80  Temp 99.1  F (37.3  C) (Tympanic)  Wt 141 lb 6.4 oz (64.1 kg)  Breastfeeding? No  BMI 24.27 kg/m2  Body mass index is 24.27 kg/(m^2).      GENERAL: healthy, alert and no distress  EYES: Eyes grossly normal to inspection, sclerae normal  HENT: normocephalic  RESP: lungs clear to auscultation - no rales, rhonchi or wheezes  CV: regular rate and rhythm, normal S1 S2, no S3 or S4, no murmur, click or rub  MS: no gross musculoskeletal defects noted, no edema  SKIN: no suspicious lesions or rashes  NEURO: Normal strength and tone, mentation intact and speech normal  PSYCH: mentation appears normal, affect normal    Diagnostic Test Results:  Results for orders placed or performed in visit on 08/05/18 (from the past 24 hour(s))   TSH   Result Value Ref Range    Thyrotropin 4.40 0.34 - 5.60 IU/mL       ASSESSMENT/PLAN:     1. Tick bite, initial encounter  - Lyme Disease Ab with reflex to WB Serum; Future  - Anaplasma phagocytoph antibody IgG IgM; Future  - Ehrlichia chaffeenis Abys IgG and IgM; Future  - TSH; Future  - Lyme Disease Ab with reflex to WB Serum  - Anaplasma phagocytoph antibody IgG IgM  - Ehrlichia chaffeenis Abys IgG and IgM  - TSH  - doxycycline (VIBRAMYCIN) 100 MG capsule; Take 1 capsule (100 mg) by mouth 2 times daily for 14 days  Dispense: 28 capsule; Refill: 0    2. Multiple joint pain  Acute, symptomatic  - Lyme Disease Ab with reflex to WB Serum; Future  - Anaplasma phagocytoph antibody IgG IgM; Future  - Ehrlichia chaffeenis Abys IgG and IgM; Future  - TSH; Future  - Lyme Disease Ab with reflex to WB Serum  - Anaplasma phagocytoph antibody IgG IgM  - Ehrlichia chaffeenis Abys IgG and IgM  - TSH  - doxycycline (VIBRAMYCIN) 100 MG capsule; Take 1 capsule (100 mg) by mouth 2 times daily for 14 days  Dispense: 28 capsule; Refill: 0      Given known exposure and current symptoms suspecting tick borne disease. Will draw labs and start treatment. Prophylactic dose not warranted as it has been greater than 72 hours since tick removal.        FUTURE APPOINTMENTS:       - Follow-up visit: It may takes 5-7 days to notice an improvement in  symptoms. If you are nearing the end of your treatment and the symptoms are still present or worsening you may need to have the course extended another week- contact your PCP regarding this.      Mary Maher CNP  [facility]

## 2018-08-05 NOTE — PATIENT INSTRUCTIONS
ASSESSMENT/PLAN:     1. Tick bite, initial encounter  - Lyme Disease Ab with reflex to WB Serum; Future  - Anaplasma phagocytoph antibody IgG IgM; Future  - Ehrlichia chaffeenis Abys IgG and IgM; Future  - TSH; Future  - Lyme Disease Ab with reflex to WB Serum  - Anaplasma phagocytoph antibody IgG IgM  - Ehrlichia chaffeenis Abys IgG and IgM  - TSH  - doxycycline (VIBRAMYCIN) 100 MG capsule; Take 1 capsule (100 mg) by mouth 2 times daily for 14 days  Dispense: 28 capsule; Refill: 0    2. Multiple joint pain  Acute, symptomatic  - Lyme Disease Ab with reflex to WB Serum; Future  - Anaplasma phagocytoph antibody IgG IgM; Future  - Ehrlichia chaffeenis Abys IgG and IgM; Future  - TSH; Future  - Lyme Disease Ab with reflex to WB Serum  - Anaplasma phagocytoph antibody IgG IgM  - Ehrlichia chaffeenis Abys IgG and IgM  - TSH  - doxycycline (VIBRAMYCIN) 100 MG capsule; Take 1 capsule (100 mg) by mouth 2 times daily for 14 days  Dispense: 28 capsule; Refill: 0    - Take entire course of antibiotic even if you start to feel better.  - Antibiotics can cause stomach upset including nausea and diarrhea. Read your bottle or ask the pharmacist if antibiotic can be taken with food to help prevent nausea. If you have symptoms of diarrhea you can take an over-the-counter probiotic and/or increase foods with probiotics such as yogurt, [place], sauerkraut.      **Research use of tick-tubes to help with prevention of ticks  **Daily tick checks      FUTURE APPOINTMENTS:       - Follow-up visit: It may takes 5-7 days to notice an improvement in symptoms. If you are nearing the end of your treatment and the symptoms are still present or worsening you may need to have the course extended another week- contact your PCP regarding this.      Mary Maher, CNP  [facility]

## 2018-08-05 NOTE — NURSING NOTE
Med sent due to a1c   Pt present to clinic today for 2 tick bites, she got the first one the end of June and the second on July 17th. She got bit on her head and under her armpit and still has a red wilmer under her arm. She has been fevers, body aches and dizziness, fatigue and foggy brained. She states it has been getting worse the past week.  Dorota Foster LPN on 8/5/2018 at 10:29 AM

## 2018-08-05 NOTE — MR AVS SNAPSHOT
After Visit Summary   8/5/2018    Miguelina Estrada    MRN: [id number]           Patient Information     Date Of Birth          1955        Visit Information        Provider Department      8/5/2018 10:30 AM Mary Maher CNP [facility]        Today's Diagnoses     Tick bite, initial encounter    -  1    Multiple joint pain          Care Instructions    ASSESSMENT/PLAN:     1. Tick bite, initial encounter  - Lyme Disease Ab with reflex to WB Serum; Future  - Anaplasma phagocytoph antibody IgG IgM; Future  - Ehrlichia chaffeenis Abys IgG and IgM; Future  - TSH; Future  - Lyme Disease Ab with reflex to WB Serum  - Anaplasma phagocytoph antibody IgG IgM  - Ehrlichia chaffeenis Abys IgG and IgM  - TSH  - doxycycline (VIBRAMYCIN) 100 MG capsule; Take 1 capsule (100 mg) by mouth 2 times daily for 14 days  Dispense: 28 capsule; Refill: 0    2. Multiple joint pain  Acute, symptomatic  - Lyme Disease Ab with reflex to WB Serum; Future  - Anaplasma phagocytoph antibody IgG IgM; Future  - Ehrlichia chaffeenis Abys IgG and IgM; Future  - TSH; Future  - Lyme Disease Ab with reflex to WB Serum  - Anaplasma phagocytoph antibody IgG IgM  - Ehrlichia chaffeenis Abys IgG and IgM  - TSH  - doxycycline (VIBRAMYCIN) 100 MG capsule; Take 1 capsule (100 mg) by mouth 2 times daily for 14 days  Dispense: 28 capsule; Refill: 0    - Take entire course of antibiotic even if you start to feel better.  - Antibiotics can cause stomach upset including nausea and diarrhea. Read your bottle or ask the pharmacist if antibiotic can be taken with food to help prevent nausea. If you have symptoms of diarrhea you can take an over-the-counter probiotic and/or increase foods with probiotics such as yogurt, [place], sauerkraut.      **Research use of tick-tubes to help with prevention of ticks  **Daily tick checks      FUTURE APPOINTMENTS:       - Follow-up visit: It may takes 5-7 days to notice an  improvement in symptoms. If you are nearing the end of your treatment and the symptoms are still present or worsening you may need to have the course extended another week- contact your PCP regarding this.      Mary Maher CNP  [facility]            Follow-ups after your visit        Who to contact     If you have questions or need follow up information about today's clinic visit or your schedule please contact [facility] directly at [phone].  Normal or non-critical lab and imaging results will be communicated to you by [employer], letter or phone within 4 business days after the clinic has received the results. If you do not hear from us within 7 days, please contact the clinic through [employer] or phone. If you have a critical or abnormal lab result, we will notify you by phone as soon as possible.  Submit refill requests through [employer] or call your pharmacy and they will forward the refill request to us. Please allow 3 business days for your refill to be completed.          Additional Information About Your Visit        [employer] Information     [employer] gives you secure access to your electronic health record. If you see a primary care provider, you can also send messages to your care team and make appointments. If you have questions, please call your primary care clinic.  If you do not have a primary care provider, please call [phone] and they will assist you.        Care EveryWhere ID     This is your Care EveryWhere ID. This could be used by other organizations to access your [place] medical records  [id number]        Your Vitals Were     Pulse Temperature Breastfeeding? BMI (Body Mass Index)          80 99.1  F (37.3  C) (Tympanic) No 24.27 kg/m2         Blood Pressure from Last 3 Encounters:   08/05/18 116/72   05/17/18 124/64   04/20/18 128/72    Weight from Last 3 Encounters:   08/05/18 141 lb 6.4 oz (64.1 kg)   05/17/18 140 lb (63.5 kg)   04/20/18  141 lb 3.2 oz (64 kg)                 Today's Medication Changes          These changes are accurate as of 8/5/18 10:58 AM.  If you have any questions, ask your nurse or doctor.               Start taking these medicines.        Dose/Directions    doxycycline 100 MG capsule   Commonly known as:  VIBRAMYCIN   Used for:  Tick bite, initial encounter, Multiple joint pain   Started by:  Mary Maher CNP        Dose:  100 mg   Take 1 capsule (100 mg) by mouth 2 times daily for 14 days   Quantity:  28 capsule   Refills:  0            Where to get your medicines      These medications were sent to [facility] - [place], [place] - [address]  [address], [facility] [address]     Phone:  [phone]     doxycycline 100 MG capsule                Primary Care Provider Office Phone # Fax #    Fady Oconnor [phone]       [address]  [facility] [address]        Equal Access to Services     TRUDY GRAHAM AH: Hadii aad ku hadasho Soomaali, waaxda luqadaha, qaybta kaalmada adeegyada, waxay wilmerin hayandrewn kamryn anderson . So [facility] [phone].    ATENCIÓN: Si domla español, tiene a coates disposición servicios gratuitos de asistencia lingüística. Llame al [phone].    We comply with applicable federal civil rights laws and [place] laws. We do not discriminate on the basis of race, color, national origin, age, disability, sex, sexual orientation, or gender identity.            Thank you!     Thank you for choosing [facility] AND [facility]  for your care. Our goal is always to provide you with excellent care. Hearing back from our patients is one way we can continue to improve our services. Please take a few minutes to complete the written survey that you may receive in the mail after your visit with us. Thank you!             Your Updated Medication List - Protect others around you: Learn how to safely use, store and throw away your  medicines at www.disposemymeds.org.          This list is accurate as of 8/5/18 10:58 AM.  Always use your most recent med list.                   Brand Name Dispense Instructions for use Diagnosis    acyclovir 400 MG tablet    ZOVIRAX     Take 400 mg by mouth 2 times daily        aspirin 81 MG tablet     90 tablet    Take 1 tablet (81 mg) by mouth daily    Paroxysmal atrial fibrillation (H)       doxycycline 100 MG capsule    VIBRAMYCIN    28 capsule    Take 1 capsule (100 mg) by mouth 2 times daily for 14 days    Tick bite, initial encounter, Multiple joint pain       EEMT 1.25-2.5 MG Tabs      Take 1.25 mg by mouth daily        RA L-LYSINE 1000 MG Tabs   Generic drug:  Lysine HCl      Take 1,000 mg by mouth daily        sucralfate 1 GM tablet    CARAFATE    120 tablet    Take 1 tablet (1 g) by mouth 4 times daily as needed    Chronic GERD       trifluridine 1 % ophthalmic solution    VIROPTIC     Place 1 drop into both eyes as needed

## 2018-08-07 LAB — B BURGDOR IGG+IGM SER QL: 0.02 (ref 0–0.89)

## 2018-08-08 LAB
A PHAGOCYTOPH IGG TITR SER IF: NORMAL {TITER}
A PHAGOCYTOPH IGM TITR SER IF: NORMAL {TITER}
E CHAFFEENSIS IGG TITR SER: NORMAL {TITER}
E CHAFFEENSIS IGM TITR SER: NORMAL {TITER}

## 2018-08-28 DIAGNOSIS — B02.30 OPHTHALMIC HERPES ZOSTER: Primary | ICD-10-CM

## 2018-08-30 RX ORDER — ACYCLOVIR 400 MG/1
TABLET ORAL
Qty: 180 TABLET | Refills: 2 | Status: SHIPPED | OUTPATIENT
Start: 2018-08-30 | End: 2019-05-29

## 2018-08-30 NOTE — TELEPHONE ENCOUNTER
Prescription for ACYCLOVIR 400MG TABLETS approved per [facility] Refill Protocol.  LOV: 05/17/18  Neelam Lind RN on 8/30/2018 at 12:00 PM

## 2018-09-21 DIAGNOSIS — Z79.890 PREMATURE MENOPAUSE ON HRT: Primary | ICD-10-CM

## 2018-09-21 DIAGNOSIS — E28.319 PREMATURE MENOPAUSE ON HRT: Primary | ICD-10-CM

## 2018-09-21 RX ORDER — ESTROGENS, ESTERIFIED AND METHYLTESTOSTERONE 2.5; 1.25 MG/1; MG/1
1.25 TABLET, COATED ORAL DAILY
Qty: 30 TABLET | Refills: 3 | OUTPATIENT
Start: 2018-09-21

## 2018-09-21 NOTE — TELEPHONE ENCOUNTER
Refill request from  for:  Est Estrogens-Methyltest (EEMT) 1.25-2.5 MG TABS-Ordered 2018 for 90 X 3     Refilled for 1 year on 2018 at [facility] [place]    Contacted [facility] and verified that patient does indeed have refills on file that will get them through 2019.    Contacted [phone] and relayed to them that [facility] states that they have refills on file.  [facility] stated that they had called them and were told Rx was .  Tech then relayed that they had called [facility] in [place] and not [place].    Requested too soon.    Unable to complete prescription refill per RN Medication Refill Policy.................... Simona Mueller ....................  2018   10:43 AM

## 2018-09-24 DIAGNOSIS — E28.319 PREMATURE MENOPAUSE ON HRT: Primary | ICD-10-CM

## 2018-09-24 DIAGNOSIS — Z79.890 PREMATURE MENOPAUSE ON HRT: Primary | ICD-10-CM

## 2018-09-24 NOTE — TELEPHONE ENCOUNTER
Refill request for:  Est Estrogens-Methyltest (EEMT) 1.25-2.5 MG TABS     Contacted patient and relayed to them that refills for Est Estrogens-Methyltest (EEMT) 1.25-2.5 MG TABS (2018 for 90 X 3) are on file at [facility] in [place] as per tech there and that [id number] stated miguel had requested Rx from the wrong [facility] and had contacted the one in [place].  Patient shares that they contacted  this morning but they still did not have Rx on file.  Advised patient to contact  again and request a Rx transfer from [facility] and if that does not work to call us back.      Patient called back and stated that [facility] shows that RX has .      Contacted [facility] once again and they shared that the medication Est Estrogens-Methyltest (EEMT) 1.25-2.5 MG TABS  in 2018 as it is considered a controlled substance with a 6 month expiration.  They apologized that the incorrect information was given out on Friday.      LOV 2018 with Mary Maher, CNP    LOV with PCP 2018    No upcoming appt noted at this time.    Will route to PCP for refill consideration as they will be back in office within 24 hours and patient is not out of medication and shares she always starts the refill process 2 weeks before she runs out.  She would like to use [id number] as the pharmacy.  Will martha up medication and route to PCP.    Unable to complete prescription refill per RN Medication Refill Policy.................... Simona Mueller ....................  2018   1:45 PM

## 2018-09-25 RX ORDER — ESTROGENS, ESTERIFIED AND METHYLTESTOSTERONE 2.5; 1.25 MG/1; MG/1
1.25 TABLET, COATED ORAL DAILY
Qty: 90 TABLET | Refills: 3 | Status: SHIPPED | OUTPATIENT
Start: 2018-09-25 | End: 2019-09-10

## 2018-09-26 NOTE — TELEPHONE ENCOUNTER
Contacted patient and relayed to them that Rx was sent to .  Patient states they picked up medication today.    Simona Mueller RN  ....................  9/26/2018   2:30 PM

## 2018-10-19 ENCOUNTER — OFFICE VISIT (OUTPATIENT)
Dept: FAMILY MEDICINE | Facility: OTHER | Age: 63
End: 2018-10-19
Payer: COMMERCIAL

## 2018-10-19 VITALS
HEART RATE: 93 BPM | HEIGHT: 65 IN | TEMPERATURE: 97.9 F | WEIGHT: 143.9 LBS | SYSTOLIC BLOOD PRESSURE: 132 MMHG | OXYGEN SATURATION: 98 % | BODY MASS INDEX: 23.97 KG/M2 | DIASTOLIC BLOOD PRESSURE: 78 MMHG

## 2018-10-19 DIAGNOSIS — N94.89 LABIAL SWELLING: Primary | ICD-10-CM

## 2018-10-19 DIAGNOSIS — R39.89 URINARY PROBLEM: ICD-10-CM

## 2018-10-19 LAB
ALBUMIN UR-MCNC: NEGATIVE MG/DL
APPEARANCE UR: CLEAR
BILIRUB UR QL STRIP: NEGATIVE
COLOR UR AUTO: YELLOW
GLUCOSE UR STRIP-MCNC: NEGATIVE MG/DL
HGB UR QL STRIP: ABNORMAL
KETONES UR STRIP-MCNC: NEGATIVE MG/DL
LEUKOCYTE ESTERASE UR QL STRIP: NEGATIVE
NITRATE UR QL: NEGATIVE
NON-SQ EPI CELLS #/AREA URNS LPF: NORMAL /LPF
PH UR STRIP: 5.5 PH (ref 5–9)
RBC #/AREA URNS AUTO: NORMAL /HPF
SOURCE: ABNORMAL
SP GR UR STRIP: <1.005 (ref 1–1.03)
SPECIMEN SOURCE: NORMAL
UROBILINOGEN UR STRIP-ACNC: 0.2 EU/DL (ref 0.2–1)
WBC #/AREA URNS AUTO: NORMAL /HPF
WET PREP SPEC: NORMAL

## 2018-10-19 PROCEDURE — 99213 OFFICE O/P EST LOW 20 MIN: CPT | Performed by: PHYSICIAN ASSISTANT

## 2018-10-19 PROCEDURE — 81001 URINALYSIS AUTO W/SCOPE: CPT | Performed by: NURSE PRACTITIONER

## 2018-10-19 PROCEDURE — 87210 SMEAR WET MOUNT SALINE/INK: CPT | Performed by: PHYSICIAN ASSISTANT

## 2018-10-19 ASSESSMENT — PAIN SCALES - GENERAL: PAINLEVEL: EXTREME PAIN (9)

## 2018-10-19 NOTE — PROGRESS NOTES
"SUBJECTIVE: Miguelina Estrada is a 62 year old female who complains of urinary urgency, frequency, burning, suprapubic discomfort, left back pain, inflamed urethra.  Onset 2 weeks ago. Course is worsening. Associated symptoms as above. Denies fever, nausea, vomiting.     Treatments: she has been pushing fluids over the past 2 week thinking this was related to her stone.   Burning sensation of her urethra wakes her at night  Vaginal: vaginal discomfort, discharge  Kidney stones - she was diagnosed last year with 2 stones. She thinks she passed the one on the right, and she still feels the stone on the left. These are small and she should be able to pass it.   Sexually active - she has abstained over the past 2 weeks    ROS  General: no fever  Abdomen: discomfort  : as above    OBJECTIVE:  Vitals:    10/19/18 1247   BP: 132/78   BP Location: Left arm   Patient Position: Sitting   Cuff Size: Adult Regular   Pulse: 93   Temp: 97.9  F (36.6  C)   TempSrc: Tympanic   SpO2: 98%   Weight: 143 lb 14.4 oz (65.3 kg)   Height: 5' 5.35\" (1.66 m)     General: alert and active, NAD  Cardiac: normal RR, no murmur  Respiratory: normal respiration, clear to ausculation  Abdomen: soft, non tender, normal bowel sounds    Results for orders placed or performed in visit on 10/19/18   *UA reflex to Microscopic   Result Value Ref Range    Color Urine Yellow     Appearance Urine Clear     Glucose Urine Negative NEG^Negative mg/dL    Bilirubin Urine Negative NEG^Negative    Ketones Urine Negative NEG^Negative mg/dL    Specific Gravity Urine <1.005 1.000 - 1.030    Blood Urine Trace (A) NEG^Negative    pH Urine 5.5 5.0 - 9.0 pH    Protein Albumin Urine Negative NEG^Negative mg/dL    Urobilinogen Urine 0.2 0.2 - 1.0 EU/dL    Nitrite Urine Negative NEG^Negative    Leukocyte Esterase Urine Negative NEG^Negative    Source Midstream Urine    Urine Microscopic   Result Value Ref Range    WBC Urine 0 - 5 OTO5^0 - 5 /HPF    RBC Urine O - 2 " OTO2^O - 2 /HPF    Squamous Epithelial /LPF Urine Few FEW^Few /LPF   Wet Prep, Genital   Result Value Ref Range    Specimen Description Vagina     Wet Prep No yeast seen     Wet Prep No Trichomonas seen     Wet Prep No clue cells seen          ASSESSMENT:  (N94.89) Labial swelling  (primary encounter diagnosis)  (R39.89) Urinary problem    Plan: *UA reflex to Microscopic, Urine Microscopic,         Wet Prep, Genital    Labia swelling and urethra irritation - onset of this correlates to the start of a new brand of her hormone tablets related to a change in her insurance formulary.   Urinalysis negative for infection, trace blood  WET prep - negative for yeast, bacterial vaginosis and trichomonas  Symptoms could be related to kidney stone - left flank pain  Labia irritation and pain - likely related to hormone pill  Consulted with PCP for recommendations - will cut EEMT dose in 1/2. Patient thinks she can cut the pills, if not she will notify me so I can prescribed the smaller dose  Follow up with PCP if symptoms persist or worsen      Gabbie Lundberg PA-C on 10/20/2018 at 10:16 AM

## 2018-10-19 NOTE — MR AVS SNAPSHOT
After Visit Summary   10/19/2018    Miguelina Estrada    MRN: [id number]           Patient Information     Date Of Birth          1955        Visit Information        Provider Department      10/19/2018 12:00 PM Gabbie Lundberg PA-C [facility]        Today's Diagnoses     Labial swelling    -  1    Urinary problem          Care Instructions    Labia swelling and urethra irritation  Urinalysis negative for infection, trace blood  WET prep - negative for yeast, bacterial vaginosis and trichomonas  Symptoms could be related to kidney stone - left flank pain  Labia irritation and pain - likely related to hormone pill  Contact PCP for recommendations           Follow-ups after your visit        Follow-up notes from your care team     Return if symptoms worsen or fail to improve.      Who to contact     If you have questions or need follow up information about today's clinic visit or your schedule please contact [facility] directly at [phone].  Normal or non-critical lab and imaging results will be communicated to you by Vortal[employer], letter or phone within 4 business days after the clinic has received the results. If you do not hear from us within 7 days, please contact the clinic through Vortalhart or phone. If you have a critical or abnormal lab result, we will notify you by phone as soon as possible.  Submit refill requests through [facility] or call your pharmacy and they will forward the refill request to us. Please allow 3 business days for your refill to be completed.          Additional Information About Your Visit        MyChart Information     [facility] gives you secure access to your electronic health record. If you see a primary care provider, you can also send messages to your care team and make appointments. If you have questions, please call your primary care clinic.  If you do not have a primary care provider, please call [phone] and they will  "assist you.        Care EveryWhere ID     This is your Care EveryWhere ID. This could be used by other organizations to access your [place] medical records  [id number]        Your Vitals Were     Pulse Temperature Height Pulse Oximetry BMI (Body Mass Index)       93 97.9  F (36.6  C) (Tympanic) 5' 5.35\" (1.66 m) 98% 23.69 kg/m2        Blood Pressure from Last 3 Encounters:   10/19/18 132/78   08/05/18 116/72   05/17/18 124/64    Weight from Last 3 Encounters:   10/19/18 143 lb 14.4 oz (65.3 kg)   08/05/18 141 lb 6.4 oz (64.1 kg)   05/17/18 140 lb (63.5 kg)              We Performed the Following     *UA reflex to Microscopic     Urine Microscopic     Wet Prep, Genital        Primary Care Provider Office Phone # Fax #    Fady Oconnor [phone] [address] [facility] [address]        Equal Access to Services     [facility]: Hadii aad ku hadasho Soomaali, waaxda luqadaha, qaybta kaalmada adeegyada, waxay iwlmerin haybessie anderson . So [facility] [phone].    ATENCIÓN: Si habla español, tiene a coates disposición servicios gratuitos de asistencia lingüística. Llame al [phone].    We comply with applicable federal civil rights laws and [place] laws. We do not discriminate on the basis of race, color, national origin, age, disability, sex, sexual orientation, or gender identity.            Thank you!     Thank you for choosing [facility]  for your care. Our goal is always to provide you with excellent care. Hearing back from our patients is one way we can continue to improve our services. Please take a few minutes to complete the written survey that you may receive in the mail after your visit with us. Thank you!             Your Updated Medication List - Protect others around you: Learn how to safely use, store and throw away your medicines at www.disposemymeds.org.          This list is accurate as of 10/19/18  1:41 PM.  Always use your most " recent med list.                   Brand Name Dispense Instructions for use Diagnosis    acyclovir 400 MG tablet    ZOVIRAX    180 tablet    TAKE 1 TABLET BY MOUTH TWICE DAILY    Ophthalmic herpes zoster       aspirin 81 MG tablet     90 tablet    Take 1 tablet (81 mg) by mouth daily    Paroxysmal atrial fibrillation (H)       EEMT 1.25-2.5 MG Tabs     90 tablet    Take 1.25 mg by mouth daily    Premature menopause on HRT       RA L-LYSINE 1000 MG Tabs   Generic drug:  Lysine HCl      Take 1,000 mg by mouth daily        sucralfate 1 GM tablet    CARAFATE    120 tablet    Take 1 tablet (1 g) by mouth 4 times daily as needed    Chronic GERD       trifluridine 1 % ophthalmic solution    VIROPTIC     Place 1 drop into both eyes as needed

## 2018-10-19 NOTE — PATIENT INSTRUCTIONS
Labia swelling and urethra irritation  Urinalysis negative for infection, trace blood  WET prep - negative for yeast, bacterial vaginosis and trichomonas  Symptoms could be related to kidney stone - left flank pain  Labia irritation and pain - likely related to hormone pill  Consulted with PCP for recommendations - will cut EEMT dose in 1/2. Patient thinks she can cut the pills, if not she will notify me so I can prescribed the smaller dose  Follow up with PCP if symptoms persist or worsen

## 2018-10-19 NOTE — NURSING NOTE
URINARY SYMPTOMS  Onset:  2 weeks  Frequency:  yes  Urgency:  no  Pain location:  Bladder   Pain scale:  9  History of Kidney stones:  yes  Burning: yes  Bloody urine: no  Odor: no  Fever: no  Patient states she had a reaction to hormone medication change previously. Patient states after starting new medication she also had tender nipples. Patient has tried sitz bath, increase water.  Laney Beckwith LPN .............10/19/2018  12:41 PM

## 2019-05-24 ENCOUNTER — OFFICE VISIT (OUTPATIENT)
Dept: FAMILY MEDICINE | Facility: OTHER | Age: 64
End: 2019-05-24
Attending: FAMILY MEDICINE
Payer: COMMERCIAL

## 2019-05-24 VITALS
SYSTOLIC BLOOD PRESSURE: 122 MMHG | TEMPERATURE: 98.6 F | WEIGHT: 138.4 LBS | DIASTOLIC BLOOD PRESSURE: 62 MMHG | RESPIRATION RATE: 14 BRPM | HEIGHT: 64 IN | OXYGEN SATURATION: 99 % | HEART RATE: 64 BPM | BODY MASS INDEX: 23.63 KG/M2

## 2019-05-24 DIAGNOSIS — L71.9 ROSACEA: ICD-10-CM

## 2019-05-24 DIAGNOSIS — Z00.00 ROUTINE GENERAL MEDICAL EXAMINATION AT A HEALTH CARE FACILITY: Primary | ICD-10-CM

## 2019-05-24 DIAGNOSIS — H54.3 VISION LOSS, BILATERAL: ICD-10-CM

## 2019-05-24 LAB
ALBUMIN SERPL-MCNC: 4.3 G/DL (ref 3.5–5.7)
ALP SERPL-CCNC: 63 U/L (ref 34–104)
ALT SERPL W P-5'-P-CCNC: 23 U/L (ref 7–52)
ANION GAP SERPL CALCULATED.3IONS-SCNC: 5 MMOL/L (ref 3–14)
AST SERPL W P-5'-P-CCNC: 22 U/L (ref 13–39)
BASOPHILS # BLD AUTO: 0 10E9/L (ref 0–0.2)
BASOPHILS NFR BLD AUTO: 0.9 %
BILIRUB SERPL-MCNC: 0.5 MG/DL (ref 0.3–1)
BUN SERPL-MCNC: 10 MG/DL (ref 7–25)
CALCIUM SERPL-MCNC: 9.4 MG/DL (ref 8.6–10.3)
CHLORIDE SERPL-SCNC: 102 MMOL/L (ref 98–107)
CO2 SERPL-SCNC: 32 MMOL/L (ref 21–31)
CREAT SERPL-MCNC: 0.79 MG/DL (ref 0.6–1.2)
DIFFERENTIAL METHOD BLD: NORMAL
EOSINOPHIL # BLD AUTO: 0.1 10E9/L (ref 0–0.7)
EOSINOPHIL NFR BLD AUTO: 1.5 %
ERYTHROCYTE [DISTWIDTH] IN BLOOD BY AUTOMATED COUNT: 12.7 % (ref 10–15)
GFR SERPL CREATININE-BSD FRML MDRD: 74 ML/MIN/{1.73_M2}
GLUCOSE SERPL-MCNC: 100 MG/DL (ref 70–105)
HCT VFR BLD AUTO: 40.1 % (ref 35–47)
HGB BLD-MCNC: 13.5 G/DL (ref 11.7–15.7)
IMM GRANULOCYTES # BLD: 0 10E9/L (ref 0–0.4)
IMM GRANULOCYTES NFR BLD: 0.4 %
LYMPHOCYTES # BLD AUTO: 1.5 10E9/L (ref 0.8–5.3)
LYMPHOCYTES NFR BLD AUTO: 32 %
MCH RBC QN AUTO: 32.1 PG (ref 26.5–33)
MCHC RBC AUTO-ENTMCNC: 33.7 G/DL (ref 31.5–36.5)
MCV RBC AUTO: 95 FL (ref 78–100)
MONOCYTES # BLD AUTO: 0.4 10E9/L (ref 0–1.3)
MONOCYTES NFR BLD AUTO: 7.8 %
NEUTROPHILS # BLD AUTO: 2.6 10E9/L (ref 1.6–8.3)
NEUTROPHILS NFR BLD AUTO: 57.4 %
PLATELET # BLD AUTO: 253 10E9/L (ref 150–450)
POTASSIUM SERPL-SCNC: 3.8 MMOL/L (ref 3.5–5.1)
PROT SERPL-MCNC: 6.9 G/DL (ref 6.4–8.9)
RBC # BLD AUTO: 4.21 10E12/L (ref 3.8–5.2)
SODIUM SERPL-SCNC: 139 MMOL/L (ref 134–144)
WBC # BLD AUTO: 4.6 10E9/L (ref 4–11)

## 2019-05-24 PROCEDURE — 85025 COMPLETE CBC W/AUTO DIFF WBC: CPT | Mod: ZL | Performed by: FAMILY MEDICINE

## 2019-05-24 PROCEDURE — 99396 PREV VISIT EST AGE 40-64: CPT | Performed by: FAMILY MEDICINE

## 2019-05-24 PROCEDURE — 80053 COMPREHEN METABOLIC PANEL: CPT | Mod: ZL | Performed by: FAMILY MEDICINE

## 2019-05-24 PROCEDURE — 36415 COLL VENOUS BLD VENIPUNCTURE: CPT | Mod: ZL | Performed by: FAMILY MEDICINE

## 2019-05-24 RX ORDER — METRONIDAZOLE 7.5 MG/G
GEL TOPICAL 2 TIMES DAILY
Qty: 45 G | Refills: 3 | Status: SHIPPED | OUTPATIENT
Start: 2019-05-24 | End: 2020-08-05

## 2019-05-24 ASSESSMENT — ANXIETY QUESTIONNAIRES
5. BEING SO RESTLESS THAT IT IS HARD TO SIT STILL: NOT AT ALL
3. WORRYING TOO MUCH ABOUT DIFFERENT THINGS: NOT AT ALL
1. FEELING NERVOUS, ANXIOUS, OR ON EDGE: NOT AT ALL
7. FEELING AFRAID AS IF SOMETHING AWFUL MIGHT HAPPEN: NOT AT ALL
6. BECOMING EASILY ANNOYED OR IRRITABLE: NOT AT ALL
IF YOU CHECKED OFF ANY PROBLEMS ON THIS QUESTIONNAIRE, HOW DIFFICULT HAVE THESE PROBLEMS MADE IT FOR YOU TO DO YOUR WORK, TAKE CARE OF THINGS AT HOME, OR GET ALONG WITH OTHER PEOPLE: NOT DIFFICULT AT ALL
2. NOT BEING ABLE TO STOP OR CONTROL WORRYING: NOT AT ALL
GAD7 TOTAL SCORE: 0

## 2019-05-24 ASSESSMENT — MIFFLIN-ST. JEOR: SCORE: 1167.78

## 2019-05-24 ASSESSMENT — PATIENT HEALTH QUESTIONNAIRE - PHQ9: 5. POOR APPETITE OR OVEREATING: NOT AT ALL

## 2019-05-24 ASSESSMENT — PAIN SCALES - GENERAL: PAINLEVEL: NO PAIN (0)
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
## 2019-05-24 NOTE — NURSING NOTE
"Coming in for a physical check up    Chief Complaint   Patient presents with     Physical     Rx refills       Initial /62 (BP Location: Right arm, Patient Position: Sitting, Cuff Size: Adult Large)   Pulse 64   Temp 98.6  F (37  C) (Tympanic)   Resp 14   Ht 1.626 m (5' 4\")   Wt 62.8 kg (138 lb 6.4 oz)   SpO2 99%   Breastfeeding? No   BMI 23.76 kg/m   Estimated body mass index is 23.76 kg/m  as calculated from the following:    Height as of this encounter: 1.626 m (5' 4\").    Weight as of this encounter: 62.8 kg (138 lb 6.4 oz).  Medication Reconciliation: complete    Shannan Morrison LPN  "

## 2019-05-25 ASSESSMENT — ANXIETY QUESTIONNAIRES: GAD7 TOTAL SCORE: 0

## 2019-05-28 ENCOUNTER — HOSPITAL ENCOUNTER (OUTPATIENT)
Dept: MRI IMAGING | Facility: OTHER | Age: 64
Discharge: HOME | End: 2019-05-28
Attending: FAMILY MEDICINE | Admitting: FAMILY MEDICINE
Payer: COMMERCIAL

## 2019-05-28 DIAGNOSIS — H54.3 VISION LOSS, BILATERAL: ICD-10-CM

## 2019-05-28 PROCEDURE — A9575 INJ GADOTERATE MEGLUMI 0.1ML: HCPCS | Performed by: FAMILY MEDICINE

## 2019-05-28 PROCEDURE — 70553 MRI BRAIN STEM W/O & W/DYE: CPT

## 2019-05-28 PROCEDURE — 25500064 ZZH RX 255 OP 636: Performed by: FAMILY MEDICINE

## 2019-05-28 RX ORDER — GADOTERATE MEGLUMINE 376.9 MG/ML
15 INJECTION INTRAVENOUS ONCE
Status: COMPLETED | OUTPATIENT
Start: 2019-05-28 | End: 2019-05-28

## 2019-05-28 RX ADMIN — GADOTERATE MEGLUMINE 12 ML: 376.9 INJECTION INTRAVENOUS at 08:33

## 2019-05-29 DIAGNOSIS — B02.30 OPHTHALMIC HERPES ZOSTER: ICD-10-CM

## 2019-05-29 DIAGNOSIS — I48.0 PAROXYSMAL ATRIAL FIBRILLATION (H): Primary | ICD-10-CM

## 2019-05-31 RX ORDER — ACYCLOVIR 400 MG/1
TABLET ORAL
Qty: 180 TABLET | Refills: 3 | Status: SHIPPED | OUTPATIENT
Start: 2019-05-31 | End: 2020-05-28

## 2019-05-31 RX ORDER — ASPIRIN 81 MG/1
TABLET, DELAYED RELEASE ORAL
Qty: 120 TABLET | Refills: 2 | Status: SHIPPED | OUTPATIENT
Start: 2019-05-31 | End: 2020-05-27

## 2019-05-31 NOTE — TELEPHONE ENCOUNTER
"Refill request from  for:  acyclovir (ZOVIRAX) 400 MG tablet  SM ASPIRIN ADULT LOW STRENGTH 81 MG EC tablet    LOV 5/24/2019 with PCP  No changes noted to requested medications  Requested Prescriptions   Pending Prescriptions Disp Refills     acyclovir (ZOVIRAX) 400 MG tablet [Pharmacy Med Name: ACYCLOVIR 400MG TABLET] 180 tablet 2     Sig: TAKE 1 TABLET BY MOUTH TWICE A DAY       Antivirals for Herpes Protocol Passed - 5/31/2019 10:41 AM        Passed - Patient is age 12 or older        Passed - Recent (12 mo) or future (30 days) visit within the authorizing provider's specialty     Patient had office visit in the last 12 months or has a visit in the next 30 days with authorizing provider or within the authorizing provider's specialty.  See \"Patient Info\" tab in inbasket, or \"Choose Columns\" in Meds & Orders section of the refill encounter.            Passed - Medication is active on med list        Passed - Normal serum creatinine on file in past 12 months     Recent Labs   Lab Test 05/24/19  1410   CR 0.79           SM ASPIRIN ADULT LOW STRENGTH 81 MG EC tablet [Pharmacy Med Name: ASPIRIN ADULT 81MG EC TABLET] 120 tablet      Sig: TAKE 1 TABLET BY MOUTH DAILY       Analgesics (Non-Narcotic Tylenol and ASA Only) Passed - 5/29/2019  8:32 AM        Passed - Recent (12 mo) or future (30 days) visit within the authorizing provider's specialty     Patient had office visit in the last 12 months or has a visit in the next 30 days with authorizing provider or within the authorizing provider's specialty.  See \"Patient Info\" tab in inbasket, or \"Choose Columns\" in Meds & Orders section of the refill encounter.            Passed - Patient is age 20 years or older     If ASA is flagged for ages under 20 years old. Forward to provider for confirmation Ryes Syndrome is not a concern.          Passed - Medication is active on med list      Prescription refilled per RN Medication Refill Policy.................... Simona ANDERSON" [place] ....................  5/31/2019   10:47 AM

## 2019-06-06 ENCOUNTER — HOSPITAL ENCOUNTER (OUTPATIENT)
Dept: ULTRASOUND IMAGING | Facility: OTHER | Age: 64
Discharge: HOME | End: 2019-06-06
Attending: FAMILY MEDICINE | Admitting: FAMILY MEDICINE
Payer: COMMERCIAL

## 2019-06-06 ENCOUNTER — HOSPITAL ENCOUNTER (OUTPATIENT)
Dept: CARDIOLOGY | Facility: OTHER | Age: 64
End: 2019-06-06
Attending: FAMILY MEDICINE
Payer: COMMERCIAL

## 2019-06-06 ENCOUNTER — HOSPITAL ENCOUNTER (OUTPATIENT)
Dept: MAMMOGRAPHY | Facility: OTHER | Age: 64
End: 2019-06-06
Attending: FAMILY MEDICINE
Payer: COMMERCIAL

## 2019-06-06 DIAGNOSIS — H54.3 VISION LOSS, BILATERAL: ICD-10-CM

## 2019-06-06 DIAGNOSIS — Z12.31 VISIT FOR SCREENING MAMMOGRAM: ICD-10-CM

## 2019-06-06 DIAGNOSIS — Z00.00 ROUTINE GENERAL MEDICAL EXAMINATION AT A HEALTH CARE FACILITY: ICD-10-CM

## 2019-06-06 DIAGNOSIS — E04.1 THYROID NODULE: Primary | ICD-10-CM

## 2019-06-06 PROCEDURE — 40000264 ECHOCARDIOGRAM COMPLETE

## 2019-06-06 PROCEDURE — 93306 TTE W/DOPPLER COMPLETE: CPT | Mod: 26 | Performed by: INTERNAL MEDICINE

## 2019-06-06 PROCEDURE — 25500064 ZZH RX 255 OP 636: Performed by: FAMILY MEDICINE

## 2019-06-06 PROCEDURE — 77067 SCR MAMMO BI INCL CAD: CPT

## 2019-06-06 PROCEDURE — 93880 EXTRACRANIAL BILAT STUDY: CPT

## 2019-06-06 RX ADMIN — PERFLUTREN 3 ML: 6.52 INJECTION, SUSPENSION INTRAVENOUS at 14:15

## 2019-06-07 ENCOUNTER — HOSPITAL ENCOUNTER (OUTPATIENT)
Dept: ULTRASOUND IMAGING | Facility: OTHER | Age: 64
Discharge: HOME | End: 2019-06-07
Attending: FAMILY MEDICINE | Admitting: FAMILY MEDICINE
Payer: COMMERCIAL

## 2019-06-07 DIAGNOSIS — E04.1 THYROID NODULE: ICD-10-CM

## 2019-06-07 PROCEDURE — 76536 US EXAM OF HEAD AND NECK: CPT

## 2019-06-18 ENCOUNTER — OFFICE VISIT (OUTPATIENT)
Dept: FAMILY MEDICINE | Facility: OTHER | Age: 64
End: 2019-06-18
Attending: FAMILY MEDICINE
Payer: COMMERCIAL

## 2019-06-18 VITALS
WEIGHT: 137.6 LBS | BODY MASS INDEX: 23.62 KG/M2 | DIASTOLIC BLOOD PRESSURE: 66 MMHG | HEART RATE: 64 BPM | RESPIRATION RATE: 14 BRPM | SYSTOLIC BLOOD PRESSURE: 116 MMHG | TEMPERATURE: 98.5 F

## 2019-06-18 DIAGNOSIS — E04.9 NODULAR GOITER, NON-TOXIC: ICD-10-CM

## 2019-06-18 DIAGNOSIS — E04.1 THYROID NODULE: Primary | ICD-10-CM

## 2019-06-18 LAB
T4 FREE SERPL-MCNC: 0.63 NG/DL (ref 0.6–1.6)
T4 SERPL-MCNC: 7.5 UG/DL (ref 6.1–12.2)
TSH SERPL DL<=0.05 MIU/L-ACNC: 3.96 IU/ML (ref 0.34–5.6)

## 2019-06-18 PROCEDURE — 84443 ASSAY THYROID STIM HORMONE: CPT | Mod: ZL | Performed by: FAMILY MEDICINE

## 2019-06-18 PROCEDURE — 84439 ASSAY OF FREE THYROXINE: CPT | Mod: ZL | Performed by: FAMILY MEDICINE

## 2019-06-18 PROCEDURE — 99213 OFFICE O/P EST LOW 20 MIN: CPT | Performed by: FAMILY MEDICINE

## 2019-06-18 PROCEDURE — 84436 ASSAY OF TOTAL THYROXINE: CPT | Mod: ZL | Performed by: FAMILY MEDICINE

## 2019-06-18 PROCEDURE — 36415 COLL VENOUS BLD VENIPUNCTURE: CPT | Mod: ZL | Performed by: FAMILY MEDICINE

## 2019-06-18 RX ORDER — ESTROGENS, ESTERIFIED AND METHYLTESTOSTERONE 2.5; 1.25 MG/1; MG/1
1.25 TABLET, COATED ORAL DAILY
Qty: 90 TABLET | Refills: 3 | Status: CANCELLED | OUTPATIENT
Start: 2019-06-18

## 2019-06-18 RX ORDER — SUCRALFATE 1 G/1
1 TABLET ORAL 4 TIMES DAILY PRN
Qty: 120 TABLET | Refills: 3 | Status: CANCELLED | OUTPATIENT
Start: 2019-06-18

## 2019-06-18 ASSESSMENT — ENCOUNTER SYMPTOMS
WEIGHT LOSS: 0
CHILLS: 0
WEAKNESS: 0
DOUBLE VISION: 0
NAUSEA: 0
NERVOUS/ANXIOUS: 0
BRUISES/BLEEDS EASILY: 0
HEARTBURN: 0
BLURRED VISION: 1
HEADACHES: 0
DIZZINESS: 0
FOCAL WEAKNESS: 0
DEPRESSION: 1
MYALGIAS: 0
VOMITING: 0

## 2019-06-18 ASSESSMENT — ANXIETY QUESTIONNAIRES
7. FEELING AFRAID AS IF SOMETHING AWFUL MIGHT HAPPEN: NOT AT ALL
3. WORRYING TOO MUCH ABOUT DIFFERENT THINGS: NOT AT ALL
1. FEELING NERVOUS, ANXIOUS, OR ON EDGE: NOT AT ALL
2. NOT BEING ABLE TO STOP OR CONTROL WORRYING: NOT AT ALL
IF YOU CHECKED OFF ANY PROBLEMS ON THIS QUESTIONNAIRE, HOW DIFFICULT HAVE THESE PROBLEMS MADE IT FOR YOU TO DO YOUR WORK, TAKE CARE OF THINGS AT HOME, OR GET ALONG WITH OTHER PEOPLE: NOT DIFFICULT AT ALL
6. BECOMING EASILY ANNOYED OR IRRITABLE: NOT AT ALL
5. BEING SO RESTLESS THAT IT IS HARD TO SIT STILL: NOT AT ALL
GAD7 TOTAL SCORE: 0

## 2019-06-18 ASSESSMENT — PATIENT HEALTH QUESTIONNAIRE - PHQ9: 5. POOR APPETITE OR OVEREATING: NOT AT ALL

## 2019-06-18 ASSESSMENT — PAIN SCALES - GENERAL: PAINLEVEL: NO PAIN (0)

## 2019-06-18 NOTE — NURSING NOTE
"Coming in for f/u on tests     Chief Complaint   Patient presents with     Results     test       Initial /66 (BP Location: Right arm, Patient Position: Sitting, Cuff Size: Adult Large)   Pulse 64   Temp 98.5  F (36.9  C) (Temporal)   Resp 14   Wt 62.4 kg (137 lb 9.6 oz)   BMI 23.62 kg/m   Estimated body mass index is 23.62 kg/m  as calculated from the following:    Height as of 5/24/19: 1.626 m (5' 4\").    Weight as of this encounter: 62.4 kg (137 lb 9.6 oz).  Medication Reconciliation: complete    Shannan Morrison LPN  "

## 2019-06-18 NOTE — PROGRESS NOTES
SUBJECTIVE:   Miguelina Estrada is a 63 year old female who presents to clinic today for the following health issues:      HPI  Miguelina Estrada here for follow up of an ultrasound of her thyroid. She has looked up some symptoms of thyroid disease and states that these fit. Family also has a history of thyroid disease. Has been feeling more fatigue lately and also has been feeling a bit more hoarse lately. Has also gained a bit of weight ~20lbs. Some cold tolerance, but this isn't necessarily new for her. Feels slightly more depressed now. Thinks that all of the above symptoms could be related to her thyroid, but also could be related to the fact that she is much more stressed lately and she is also getting older.   Has noticed in the past year that she is having more neck pain and is curious to know if this is related to her thyroid as well.       Patient Active Problem List    Diagnosis Date Noted     Paroxysmal atrial fibrillation (H) 05/18/2018     Priority: Medium     Ophthalmic herpes zoster 09/14/2017     Priority: Medium     Chronic GERD 09/02/2016     Priority: Medium     Rosacea 12/07/2015     Priority: Medium     Premature menopause on HRT 05/31/2013     Priority: Medium     Past Surgical History:   Procedure Laterality Date     COLONOSCOPY  08/11/2017    Done by Dr. Barbara Goss     ENDOSCOPY UPPER, COLONOSCOPY, COMBINED      8/11/2017     ESOPHAGOSCOPY, GASTROSCOPY, DUODENOSCOPY (EGD), COMBINED      2009     HYSTERECTOMY TOTAL ABDOMINAL, BILATERAL SALPINGO-OOPHORECTOMY, COMBINED      2003     LAPAROSCOPIC CHOLECYSTECTOMY      2007     LAPAROSCOPIC TUBAL LIGATION      1980s     Social History     Socioeconomic History     Marital status:      Spouse name: Jerrod     Number of children: Not on file     Years of education: Not on file     Highest education level: Not on file   Occupational History     Not on file   Social Needs     Financial resource strain: Not on file     Food insecurity:      Worry: Not on file     Inability: Not on file     Transportation needs:     Medical: Not on file     Non-medical: Not on file   Tobacco Use     Smoking status: Never Smoker     Smokeless tobacco: Never Used   Substance and Sexual Activity     Alcohol use: No     Alcohol/week: 0.0 oz     Drug use: No     Sexual activity: Yes     Partners: Male     Birth control/protection: Female Surgical   Lifestyle     Physical activity:     Days per week: Not on file     Minutes per session: Not on file     Stress: Not on file   Relationships     Social connections:     Talks on phone: Not on file     Gets together: Not on file     Attends Orthodoxy service: Not on file     Active member of club or organization: Not on file     Attends meetings of clubs or organizations: Not on file     Relationship status: Not on file     Intimate partner violence:     Fear of current or ex partner: Not on file     Emotionally abused: Not on file     Physically abused: Not on file     Forced sexual activity: Not on file   Other Topics Concern     Parent/sibling w/ CABG, MI or angioplasty before 65F 55M? Not Asked   Social History Narrative    Working as an  at [employer], since 9/13.   .  is retired.  One daughter in [place] born 1973     Current Outpatient Medications   Medication     acyclovir (ZOVIRAX) 400 MG tablet     Est Estrogens-Methyltest (EEMT) 1.25-2.5 MG TABS     Lysine HCl (RA L-LYSINE) 1000 MG TABS     metroNIDAZOLE (METROGEL) 0.75 % external gel     SM ASPIRIN ADULT LOW STRENGTH 81 MG EC tablet     sucralfate (CARAFATE) 1 GM tablet     trifluridine (VIROPTIC) 1 % ophthalmic solution     No current facility-administered medications for this visit.         Allergies   Allergen Reactions     Erythromycin Nausea and Vomiting     Levofloxacin Other (See Comments) and Palpitations     Penicillins Rash     Sulfa Drugs Rash        Review of Systems   Constitutional: Negative for chills  and weight loss.   HENT: Negative for congestion.    Eyes: Positive for blurred vision. Negative for double vision.   Cardiovascular: Negative for chest pain.   Gastrointestinal: Negative for heartburn, nausea and vomiting.   Musculoskeletal: Negative for myalgias.   Neurological: Negative for dizziness, focal weakness, weakness and headaches.   Endo/Heme/Allergies: Does not bruise/bleed easily.   Psychiatric/Behavioral: Positive for depression. The patient is not nervous/anxious.          OBJECTIVE:     /66 (BP Location: Right arm, Patient Position: Sitting, Cuff Size: Adult Large)   Pulse 64   Temp 98.5  F (36.9  C) (Temporal)   Resp 14   Wt 62.4 kg (137 lb 9.6 oz)   BMI 23.62 kg/m    137 lbs 9.6 oz  Body mass index is 23.62 kg/m .     Physical Exam   Constitutional: She is oriented to person, place, and time. She appears well-developed and well-nourished. No distress.   HENT:   Head: Normocephalic and atraumatic.   Neck: Normal range of motion. Neck supple. No JVD present. No thyromegaly present.   Cardiovascular: Normal rate, regular rhythm, normal heart sounds and intact distal pulses. Exam reveals no gallop and no friction rub.   No murmur heard.  Pulmonary/Chest: Effort normal and breath sounds normal. No stridor. No respiratory distress. She has no wheezes. She has no rales. She exhibits no tenderness.   Musculoskeletal: Normal range of motion. She exhibits no edema, tenderness or deformity.   Lymphadenopathy:     She has no cervical adenopathy.   Neurological: She is alert and oriented to person, place, and time.   Skin: Skin is warm and dry. Capillary refill takes less than 2 seconds. She is not diaphoretic.   Psychiatric: She has a normal mood and affect. Her behavior is normal. Judgment and thought content normal.       Results for orders placed or performed during the hospital encounter of 06/07/19   US Thyroid    Narrative    PROCEDURE: US THYROID    HISTORY: Thyroid nodule.     TECHNIQUE:  Ultrasound of the thyroid gland was performed.    COMPARISON: None.    FINDINGS:     MEASUREMENTS:     Right lobe: 4.0 x 1.8 x 1.5 cm  Left lobe: 4.2 x 1.5 x 1.4 cm  Isthmus: 1 mm in thickness    The thyroid gland is diffusely heterogeneous in echotexture. There is  a 1.1 x 0.6 x 1.5 cm oval, hypoechoic nodule posteriorly in the right  thyroid lobe. There is a heterogeneous, hypoechoic and slightly  lobulated nodule in the medial left thyroid lobe measuring 0.9 x 0.6 x  1.0 cm.      Impression    IMPRESSION: Multinodular thyroid gland without dominant nodule. Given  the relatively small size of multiple nodules, yearly follow-up  suggested.    DERRELL BERMUDEZ MD           ASSESSMENT/PLAN:     (E04.1) Thyroid nodule  (primary encounter diagnosis)  Comment: Incidentally discovered on carotid ultrasound. Repeat ultrasound showed nodules on the left and right thyroid lobes. Somewhat symptomatic, but will check labs today.  Plan: TSH, T3, Free, T4, Free, T4 (Thyroxine)      Rodger Mckeon  MS4    Pt was seen by me as well as Rodger Mckeon MS3.  I was present for the exam and medical decision making, and I confirmed the complete history.      Results for orders placed or performed in visit on 06/18/19   T4 (Thyroxine)   Result Value Ref Range    T4 Total 7.5 6.1 - 12.2 ug/dL   T4, Free   Result Value Ref Range    T4 Free 0.63 0.60 - 1.60 ng/dL   TSH   Result Value Ref Range    Thyrotropin 3.96 0.34 - 5.60 IU/mL       Fady Oconnor MD on 6/18/2019 at 11:10 AM

## 2019-06-19 ASSESSMENT — ANXIETY QUESTIONNAIRES: GAD7 TOTAL SCORE: 0

## 2019-09-06 DIAGNOSIS — Z79.890 PREMATURE MENOPAUSE ON HRT: Primary | ICD-10-CM

## 2019-09-06 DIAGNOSIS — E28.319 PREMATURE MENOPAUSE ON HRT: Primary | ICD-10-CM

## 2019-09-10 RX ORDER — ESTROGENS, ESTERIFIED AND METHYLTESTOSTERONE 2.5; 1.25 MG/1; MG/1
1 TABLET, COATED ORAL DAILY
Qty: 90 TABLET | Refills: 3 | Status: SHIPPED | OUTPATIENT
Start: 2019-09-10 | End: 2020-07-24

## 2019-09-10 NOTE — TELEPHONE ENCOUNTER
[id number] sent Rx request for the following:      Est Estrogens-Methyltest (EEMT) 1.25-2.5 MG TABS   Last Prescription Date:   9/25/18  Last Fill Qty/Refills:         90, R-3    Last Office Visit:              6/18/19  Future Office visit:           None.  Routing refill request to provider for review/approval because:  Drug not on the [facility], [facility] or [facility] refill protocol or controlled substance    Unable to complete prescription refill per RN Medication Refill Policy. Bekah Robles RN .............. 9/10/2019  1:56 PM

## 2020-03-11 ENCOUNTER — HEALTH MAINTENANCE LETTER (OUTPATIENT)
Age: 65
End: 2020-03-11

## 2020-05-26 DIAGNOSIS — B02.30 OPHTHALMIC HERPES ZOSTER: Primary | ICD-10-CM

## 2020-05-27 DIAGNOSIS — I48.0 PAROXYSMAL ATRIAL FIBRILLATION (H): ICD-10-CM

## 2020-05-27 NOTE — TELEPHONE ENCOUNTER
sent Rx request for the following:   acyclovir (ZOVIRAX) 400 MG tablet  Sig:  TAKE 1 TABLET BY MOUTH TWICE A DAY    Last Prescription Date:   5/31/2019  Last Fill Qty/Refills:         180, R-3    Last Office Visit:              6/18/2019   Future Office visit:           None  Routing refill request to provider for review/approval because:  Antivirals for Herpes Protocol Failed  Normal serum creatinine on file in past 12 months    Simona Mueller RN  ....................  5/27/2020   4:10 PM

## 2020-05-27 NOTE — TELEPHONE ENCOUNTER
[id number] sent Rx request for the following:      aspirin (SM ASPIRIN ADULT LOW STRENGTH) 81 MG EC tablet   Sig: Take 1 tablet (81 mg) by mouth daily      Last Prescription Date:   5/31/2019  Last Fill Qty/Refills:         120, R-2    Last Office Visit:              6/18/2019   Future Office visit:           none      Prescription refilled per RN Medication Refill Policy.................... Toñito Sheriff RN ....................  5/27/2020   9:16 AM

## 2020-05-28 RX ORDER — ACYCLOVIR 400 MG/1
TABLET ORAL
Qty: 180 TABLET | Refills: 3 | Status: SHIPPED | OUTPATIENT
Start: 2020-05-28 | End: 2020-08-05

## 2020-07-01 ENCOUNTER — TELEPHONE (OUTPATIENT)
Dept: FAMILY MEDICINE | Facility: OTHER | Age: 65
End: 2020-07-01

## 2020-07-01 NOTE — TELEPHONE ENCOUNTER
States she has been waiting 2 years to get a shingles shot and now that the pharmacy has it, she's wondering if she should get it now or wait due to pandemic

## 2020-07-22 DIAGNOSIS — Z79.890 PREMATURE MENOPAUSE ON HRT: ICD-10-CM

## 2020-07-22 DIAGNOSIS — E28.319 PREMATURE MENOPAUSE ON HRT: ICD-10-CM

## 2020-07-24 RX ORDER — ESTERIFIED ESTROGEN AND METHYLTESTOSTERONE 1.25; 2.5 MG/1; MG/1
TABLET ORAL
Qty: 90 TABLET | Refills: 0 | Status: SHIPPED | OUTPATIENT
Start: 2020-07-24 | End: 2020-08-05

## 2020-07-24 NOTE — TELEPHONE ENCOUNTER
Requested Prescriptions   Pending Prescriptions Disp Refills     estrogens-methylTESTOSTERone (ESTRATEST) 1.25-2.5 MG per tablet [Pharmacy Med Name: ESTROGEN-METHYLTESTOS F.S. TAB] 90 tablet      Sig: TAKE 1 TABLET BY MOUTH DAILY       There is no refill protocol information for this order            Last Written Prescription Date:  09/10/2019  Last Fill Quantity: 90,   # refills: 3  Last Office Visit: 06/18/2019 with Fady Oconnor MD  Future Office visit:   None noted.  Unable to complete prescription refill per RN medication refill policy. Will route to provider for review and consideration.  Marguerite Arvizu RN on 7/24/2020 at 8:43 AM

## 2020-08-04 ENCOUNTER — OFFICE VISIT (OUTPATIENT)
Dept: FAMILY MEDICINE | Facility: OTHER | Age: 65
End: 2020-08-04
Attending: FAMILY MEDICINE
Payer: COMMERCIAL

## 2020-08-04 VITALS
WEIGHT: 133.4 LBS | RESPIRATION RATE: 16 BRPM | HEIGHT: 64 IN | DIASTOLIC BLOOD PRESSURE: 60 MMHG | OXYGEN SATURATION: 99 % | HEART RATE: 73 BPM | TEMPERATURE: 97.8 F | BODY MASS INDEX: 22.77 KG/M2 | SYSTOLIC BLOOD PRESSURE: 124 MMHG

## 2020-08-04 DIAGNOSIS — Z00.00 ROUTINE HISTORY AND PHYSICAL EXAMINATION OF ADULT: Primary | ICD-10-CM

## 2020-08-04 DIAGNOSIS — L71.9 ROSACEA: ICD-10-CM

## 2020-08-04 DIAGNOSIS — K44.9 HIATAL HERNIA: ICD-10-CM

## 2020-08-04 DIAGNOSIS — E28.319 PREMATURE MENOPAUSE ON HRT: ICD-10-CM

## 2020-08-04 DIAGNOSIS — K21.9 CHRONIC GERD: ICD-10-CM

## 2020-08-04 DIAGNOSIS — B02.30 OPHTHALMIC HERPES ZOSTER: ICD-10-CM

## 2020-08-04 DIAGNOSIS — Z79.890 PREMATURE MENOPAUSE ON HRT: ICD-10-CM

## 2020-08-04 PROCEDURE — 99396 PREV VISIT EST AGE 40-64: CPT | Performed by: FAMILY MEDICINE

## 2020-08-04 ASSESSMENT — PATIENT HEALTH QUESTIONNAIRE - PHQ9
5. POOR APPETITE OR OVEREATING: NOT AT ALL
SUM OF ALL RESPONSES TO PHQ QUESTIONS 1-9: 0

## 2020-08-04 ASSESSMENT — PAIN SCALES - GENERAL: PAINLEVEL: NO PAIN (0)

## 2020-08-04 ASSESSMENT — MIFFLIN-ST. JEOR: SCORE: 1140.1

## 2020-08-04 ASSESSMENT — ANXIETY QUESTIONNAIRES
2. NOT BEING ABLE TO STOP OR CONTROL WORRYING: NOT AT ALL
3. WORRYING TOO MUCH ABOUT DIFFERENT THINGS: NOT AT ALL
1. FEELING NERVOUS, ANXIOUS, OR ON EDGE: NOT AT ALL
5. BEING SO RESTLESS THAT IT IS HARD TO SIT STILL: NOT AT ALL
6. BECOMING EASILY ANNOYED OR IRRITABLE: NOT AT ALL
7. FEELING AFRAID AS IF SOMETHING AWFUL MIGHT HAPPEN: NOT AT ALL
GAD7 TOTAL SCORE: 0

## 2020-08-04 NOTE — PROGRESS NOTES
SUBJECTIVE:   Miguelina Estrada is a 64 year old female who presents to clinic today for the following health issues: Physical medication refill      Patient arrives here for physical and medication refill.  Patient currently does not have any concerns or complaints.  She is up-to-date on colon screening.  Mammogram done last year.  She states that she is getting a mammogram every 2 years.  Tolerating her medications well.  Patient is status post hysterectomy.  Does not need a Pap smear.        Patient Active Problem List    Diagnosis Date Noted     Hiatal hernia 08/04/2020     Priority: Medium     Nodular goiter, non-toxic 06/18/2019     Priority: Medium     Paroxysmal atrial fibrillation (H) 05/18/2018     Priority: Medium     Ophthalmic herpes zoster 09/14/2017     Priority: Medium     Chronic GERD 09/02/2016     Priority: Medium     Rosacea 12/07/2015     Priority: Medium     Premature menopause on HRT 05/31/2013     Priority: Medium     Past Medical History:   Diagnosis Date     Asymptomatic premature menopause     mid 30s     Cardiac arrhythmia     No Comments Provided     Diaphragmatic hernia without obstruction or gangrene     No Comments Provided     Hypoglycemia     episodes of hypoglycemia if not eating     Paroxysmal atrial fibrillation (H) 5/18/2018     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 Barbara Goss     ENDOSCOPY UPPER, COLONOSCOPY, COMBINED      8/11/2017     ESOPHAGOSCOPY, GASTROSCOPY, DUODENOSCOPY (EGD), COMBINED      2009     HYSTERECTOMY TOTAL ABDOMINAL, BILATERAL SALPINGO-OOPHORECTOMY, COMBINED      2003     LAPAROSCOPIC CHOLECYSTECTOMY      2007     LAPAROSCOPIC TUBAL LIGATION      1980s     Current Outpatient Medications   Medication Sig Dispense Refill     acyclovir (ZOVIRAX) 400 MG tablet TAKE 1 TABLET BY MOUTH TWICE A  tablet 3     aspirin (SM  "ASPIRIN ADULT LOW STRENGTH) 81 MG EC tablet Take 1 tablet (81 mg) by mouth daily 120 tablet 0     estrogens-methylTESTOSTERone (ESTRATEST) 1.25-2.5 MG per tablet TAKE 1 TABLET BY MOUTH DAILY 90 tablet 0     Lysine HCl (RA L-LYSINE) 1000 MG TABS Take 1,000 mg by mouth daily       metroNIDAZOLE (METROGEL) 0.75 % external gel Apply topically 2 times daily 45 g 3     sucralfate (CARAFATE) 1 GM tablet Take 1 tablet (1 g) by mouth 4 times daily as needed 120 tablet 3     trifluridine (VIROPTIC) 1 % ophthalmic solution Place 1 drop into both eyes as needed  0     Allergies   Allergen Reactions     Erythromycin Nausea and Vomiting     Levofloxacin Other (See Comments) and Palpitations     Penicillins Rash     Sulfa Drugs Rash       Review of Systems   Constitutional: Negative for chills and fever.   Eyes: Negative.    Respiratory: Negative.    Cardiovascular: Negative for chest pain.   Genitourinary: Negative.    Neurological: Negative for dizziness.   Psychiatric/Behavioral: Negative.         OBJECTIVE:     /60   Pulse 73   Temp 97.8  F (36.6  C)   Resp 16   Ht 1.626 m (5' 4\")   Wt 60.5 kg (133 lb 6.4 oz)   SpO2 99%   BMI 22.90 kg/m    Body mass index is 22.9 kg/m .  Physical Exam  Constitutional:       Appearance: Normal appearance.   Cardiovascular:      Rate and Rhythm: Normal rate and regular rhythm.   Neurological:      Mental Status: She is alert.         Diagnostic Test Results:  No results found for any visits on 08/04/20.    ASSESSMENT/PLAN:         1. Routine history and physical examination of adult  Satisfactory    2. Hiatal hernia  Satisfactory    3. Chronic GERD  Satisfactory        Jessee Sandy MD  [facility] AND [facility]  "

## 2020-08-04 NOTE — NURSING NOTE
Patient here for yearly physical and medication refills. no concerns today. Last dental exam 6 months prior 02/2020 and last eye exam 02/2020. Medication Reconciliation: complete.    Camryn Rodgers LPN  8/4/2020 8:08 AM

## 2020-08-05 RX ORDER — SUCRALFATE 1 G/1
1 TABLET ORAL 4 TIMES DAILY PRN
Qty: 120 TABLET | Refills: 3 | Status: SHIPPED | OUTPATIENT
Start: 2020-08-05 | End: 2024-01-11

## 2020-08-05 RX ORDER — ESTERIFIED ESTROGEN AND METHYLTESTOSTERONE 1.25; 2.5 MG/1; MG/1
1 TABLET ORAL DAILY
Qty: 90 TABLET | Refills: 3 | Status: SHIPPED | OUTPATIENT
Start: 2020-08-05 | End: 2020-12-04

## 2020-08-05 RX ORDER — METRONIDAZOLE 7.5 MG/G
GEL TOPICAL 2 TIMES DAILY
Qty: 45 G | Refills: 3 | Status: SHIPPED | OUTPATIENT
Start: 2020-08-05 | End: 2024-03-04

## 2020-08-05 RX ORDER — ACYCLOVIR 400 MG/1
TABLET ORAL
Qty: 180 TABLET | Refills: 3 | Status: SHIPPED | OUTPATIENT
Start: 2020-08-05 | End: 2021-08-31

## 2020-08-05 ASSESSMENT — ANXIETY QUESTIONNAIRES: GAD7 TOTAL SCORE: 0

## 2020-08-05 ASSESSMENT — ENCOUNTER SYMPTOMS
FEVER: 0
CHILLS: 0
PSYCHIATRIC NEGATIVE: 1
EYES NEGATIVE: 1
RESPIRATORY NEGATIVE: 1
DIZZINESS: 0

## 2020-10-04 DIAGNOSIS — I48.0 PAROXYSMAL ATRIAL FIBRILLATION (H): ICD-10-CM

## 2020-10-05 RX ORDER — ASPIRIN 81 MG/1
TABLET ORAL
Qty: 90 TABLET | Refills: 2 | Status: SHIPPED | OUTPATIENT
Start: 2020-10-05 | End: 2022-01-07

## 2020-10-05 NOTE — TELEPHONE ENCOUNTER
"Requested Prescriptions   Pending Prescriptions Disp Refills     ASPIRIN ADULT LOW STRENGTH 81 MG EC tablet [Pharmacy Med Name: ASPIRIN 81MG EC TABLET] 30 tablet      Sig: TAKE 1 TABLET (81 MG) BY MOUTH DAILY       Analgesics (Non-Narcotic Tylenol and ASA Only) Passed - 10/4/2020  7:51 AM        Passed - Recent (12 mo) or future (30 days) visit within the authorizing provider's specialty     Patient has had an office visit with the authorizing provider or a provider within the authorizing providers department within the previous 12 mos or has a future within next 30 days. See \"Patient Info\" tab in inbasket, or \"Choose Columns\" in Meds & Orders section of the refill encounter.              Passed - Patient is age 20 years or older     If ASA is flagged for ages under 20 years old. Forward to provider for confirmation Ryes Syndrome is not a concern.              Passed - Medication is active on med list         LOV 8/4/20 [place]  Prescription approved per [facility] Refill Protocol.  Brenda J. Goodell, RN on 10/5/2020 at 11:14 AM      "

## 2020-10-21 ENCOUNTER — OFFICE VISIT (OUTPATIENT)
Dept: FAMILY MEDICINE | Facility: OTHER | Age: 65
End: 2020-10-21
Attending: FAMILY MEDICINE
Payer: COMMERCIAL

## 2020-10-21 VITALS
BODY MASS INDEX: 23.62 KG/M2 | RESPIRATION RATE: 18 BRPM | HEART RATE: 58 BPM | OXYGEN SATURATION: 98 % | TEMPERATURE: 98 F | WEIGHT: 137.6 LBS | SYSTOLIC BLOOD PRESSURE: 132 MMHG | DIASTOLIC BLOOD PRESSURE: 74 MMHG

## 2020-10-21 DIAGNOSIS — I48.0 PAROXYSMAL ATRIAL FIBRILLATION (H): ICD-10-CM

## 2020-10-21 DIAGNOSIS — H91.91 ACUTE HEARING LOSS OF RIGHT EAR: ICD-10-CM

## 2020-10-21 DIAGNOSIS — H93.A9 SUBJECTIVE PULSATILE TINNITUS: Primary | ICD-10-CM

## 2020-10-21 DIAGNOSIS — E04.1 THYROID NODULE: ICD-10-CM

## 2020-10-21 DIAGNOSIS — R73.09 ELEVATED GLUCOSE: ICD-10-CM

## 2020-10-21 DIAGNOSIS — R42 VERTIGO: ICD-10-CM

## 2020-10-21 LAB
ANION GAP SERPL CALCULATED.3IONS-SCNC: 7 MMOL/L (ref 3–14)
BUN SERPL-MCNC: 10 MG/DL (ref 7–25)
CALCIUM SERPL-MCNC: 10 MG/DL (ref 8.6–10.3)
CHLORIDE SERPL-SCNC: 101 MMOL/L (ref 98–107)
CO2 SERPL-SCNC: 29 MMOL/L (ref 21–31)
CREAT SERPL-MCNC: 0.74 MG/DL (ref 0.6–1.2)
ERYTHROCYTE [DISTWIDTH] IN BLOOD BY AUTOMATED COUNT: 12.6 % (ref 10–15)
GFR SERPL CREATININE-BSD FRML MDRD: 79 ML/MIN/{1.73_M2}
GLUCOSE SERPL-MCNC: 140 MG/DL (ref 70–105)
HBA1C MFR BLD: 5.2 % (ref 4–6)
HCT VFR BLD AUTO: 44.2 % (ref 35–47)
HGB BLD-MCNC: 15.1 G/DL (ref 11.7–15.7)
MCH RBC QN AUTO: 31.5 PG (ref 26.5–33)
MCHC RBC AUTO-ENTMCNC: 34.2 G/DL (ref 31.5–36.5)
MCV RBC AUTO: 92 FL (ref 78–100)
PLATELET # BLD AUTO: 264 10E9/L (ref 150–450)
POTASSIUM SERPL-SCNC: 3.8 MMOL/L (ref 3.5–5.1)
RBC # BLD AUTO: 4.8 10E12/L (ref 3.8–5.2)
SODIUM SERPL-SCNC: 137 MMOL/L (ref 134–144)
TSH SERPL DL<=0.05 MIU/L-ACNC: 4.64 IU/ML (ref 0.34–5.6)
WBC # BLD AUTO: 5.8 10E9/L (ref 4–11)

## 2020-10-21 PROCEDURE — 99214 OFFICE O/P EST MOD 30 MIN: CPT | Performed by: FAMILY MEDICINE

## 2020-10-21 PROCEDURE — 83036 HEMOGLOBIN GLYCOSYLATED A1C: CPT | Mod: ZL | Performed by: FAMILY MEDICINE

## 2020-10-21 PROCEDURE — 84443 ASSAY THYROID STIM HORMONE: CPT | Mod: ZL | Performed by: FAMILY MEDICINE

## 2020-10-21 PROCEDURE — 80048 BASIC METABOLIC PNL TOTAL CA: CPT | Mod: ZL | Performed by: FAMILY MEDICINE

## 2020-10-21 PROCEDURE — 85027 COMPLETE CBC AUTOMATED: CPT | Mod: ZL | Performed by: FAMILY MEDICINE

## 2020-10-21 PROCEDURE — 36415 COLL VENOUS BLD VENIPUNCTURE: CPT | Mod: ZL | Performed by: FAMILY MEDICINE

## 2020-10-21 ASSESSMENT — PAIN SCALES - GENERAL: PAINLEVEL: MODERATE PAIN (4)

## 2020-10-21 NOTE — PROGRESS NOTES
Nursing Notes:   Marianna Grijalva LPN  10/21/2020 10:02 AM  Signed  Pt presents to clinic today for intermitting right ear pain over the last few months.      Medication Reconciliation: complete  Marianna Grijalva LPN       SUBJECTIVE:  64 year old female presents for head pressure for a few months. Intermittent. Will wake her at night sometimes. Can feel her heart beating in her head.   The tone of the pulsation has deepened and is closer to a machine noise.  Some right neck pain near ear.  Has been putting off this issue until her physical with PCP in December, but has worsened, so  pushed her to be evaluated.  Cannot hear out of the right ear. Some dizziness and has almost fallen down the steps at home.  Some left eye vision problems last year and had a MRI in May 2019 showing only changes suggestive of chronic small vessel ischemic change.  She has some palpitations, but also has a-fib  No having temple headaches. No vision loss. No weakness or other polymyalgia rheumatica symptoms.    REVIEW OF SYSTEMS:    Pertinent items are noted in HPI.    Current Outpatient Medications   Medication Sig Dispense Refill     acyclovir (ZOVIRAX) 400 MG tablet TAKE 1 TABLET BY MOUTH TWICE A  tablet 3     ASPIRIN ADULT LOW STRENGTH 81 MG EC tablet TAKE 1 TABLET (81 MG) BY MOUTH DAILY 90 tablet 2     estrogens-methylTESTOSTERone (ESTRATEST) 1.25-2.5 MG per tablet Take 1 tablet by mouth daily 90 tablet 3     Lysine HCl (RA L-LYSINE) 1000 MG TABS Take 1,000 mg by mouth daily       metroNIDAZOLE (METROGEL) 0.75 % external gel Apply topically 2 times daily 45 g 3     sucralfate (CARAFATE) 1 GM tablet Take 1 tablet (1 g) by mouth 4 times daily as needed 120 tablet 3     trifluridine (VIROPTIC) 1 % ophthalmic solution Place 1 drop into both eyes as needed  0     Allergies   Allergen Reactions     Erythromycin Nausea and Vomiting     Levofloxacin Other (See Comments) and Palpitations     Penicillins Rash     Sulfa Drugs Rash        OBJECTIVE:  /74   Pulse 58   Temp 98  F (36.7  C) (Temporal)   Resp 18   Wt 62.4 kg (137 lb 9.6 oz)   SpO2 98%   BMI 23.62 kg/m      EXAM:  General Appearance: Alert. No acute distress  Eyes: EOMI, PERRL  Ears: Normal canals and TMs  Neck: No adenopathy  Chest/Respiratory Exam: Clear to auscultation bilaterally  Cardiovascular Exam: Irregularly irregular, no murmurs gallops or rubs  Gastrointestinal Exam: Soft, nontender, no abnormal masses or organomegaly.  Extremities: 2+ pedal pulses.  No lower extremity edema.  Musculoskeletal: No tenderness in upper arms or thighs  Neuro Exam: Alert, oriented x 3. CN II-XI intact. Sensation to light touch intact; reflexes 2+, strength symmetric upper and lower extremities  Psychiatric: Normal affect and mentation    Results for orders placed or performed in visit on 10/21/20   CBC W PLT No Diff     Status: None   Result Value Ref Range    WBC 5.8 4.0 - 11.0 10e9/L    RBC Count 4.80 3.8 - 5.2 10e12/L    Hemoglobin 15.1 11.7 - 15.7 g/dL    Hematocrit 44.2 35.0 - 47.0 %    MCV 92 78 - 100 fl    MCH 31.5 26.5 - 33.0 pg    MCHC 34.2 31.5 - 36.5 g/dL    RDW 12.6 10.0 - 15.0 %    Platelet Count 264 150 - 450 10e9/L   TSH Reflex GH     Status: None   Result Value Ref Range    TSH Reflex 4.64 0.34 - 5.60 IU/mL   Basic Metabolic Panel     Status: Abnormal   Result Value Ref Range    Sodium 137 134 - 144 mmol/L    Potassium 3.8 3.5 - 5.1 mmol/L    Chloride 101 98 - 107 mmol/L    Carbon Dioxide 29 21 - 31 mmol/L    Anion Gap 7 3 - 14 mmol/L    Glucose 140 (H) 70 - 105 mg/dL    Urea Nitrogen 10 7 - 25 mg/dL    Creatinine 0.74 0.60 - 1.20 mg/dL    GFR Estimate 79 >60 mL/min/[1.73_m2]    GFR Estimate If Black >90 >60 mL/min/[1.73_m2]    Calcium 10.0 8.6 - 10.3 mg/dL   Hemoglobin A1c     Status: None   Result Value Ref Range    Hemoglobin A1C 5.2 4.0 - 6.0 %        ASSESSMENT/PLAN:    ICD-10-CM    1. Subjective pulsatile tinnitus  H93.A9 Basic Metabolic Panel     CBC W PLT No  Diff     MRA Neck (Carotids) wo & w Contrast     CBC W PLT No Diff     Basic Metabolic Panel     MRA Brain ([place]) wo Contrast     MR Internal Auditory Canal wo&w Contrast     CANCELED: MR Brain w/o & w Contrast     CANCELED: MRA Brain ([place]) wo & w Contrast   2. Acute hearing loss of right ear  H91.91 Basic Metabolic Panel     CBC W PLT No Diff     MRA Neck (Carotids) wo & w Contrast     CBC W PLT No Diff     Basic Metabolic Panel     MRA Brain ([place]) wo Contrast     MR Internal Auditory Canal wo&w Contrast     CANCELED: MR Brain w/o & w Contrast     CANCELED: MRA Brain ([place]) wo & w Contrast   3. Paroxysmal atrial fibrillation (H)  I48.0 Basic Metabolic Panel     CBC W PLT No Diff     CBC W PLT No Diff     Basic Metabolic Panel   4. Thyroid nodule  E04.1 TSH Reflex GH     TSH Reflex GH   5. Vertigo  R42 Basic Metabolic Panel     CBC W PLT No Diff     MRA Neck (Carotids) wo & w Contrast     CBC W PLT No Diff     Basic Metabolic Panel     MRA Brain ([place]) wo Contrast     MR Internal Auditory Canal wo&w Contrast     CANCELED: MR Brain w/o & w Contrast     CANCELED: MRA Brain ([place]) wo & w Contrast   6. Elevated glucose  R73.09 Hemoglobin A1c     Hemoglobin A1c       Her symptoms of pulsatile tinnitus, machinelike noise, hearing loss, and vertigo are certainly concerning for intracranial pathology.  She did have a normal MRI in May 2019, but it would be best to assess auditory nerve and for aneurysm given her symptoms. Ordered MRA neck and brain along with MRI IACs.    Certainly some of her symptoms could be explained by thyroid dysfunction, or less or concerning conditions such as eustachian  tube dysfunction, vertigo, A. fib, or anxiety.  No treatment was initiated at this time, awaiting lab results and MRI findings.    Greater than 50% of this 26 minute appointment spent on counseling     Santiago Walters MD    This document was prepared using a  combination of typing and voice generated software.  While every attempt was made for accuracy, spelling and grammatical errors may exist.

## 2020-10-21 NOTE — NURSING NOTE
Pt presents to clinic today for intermitting right ear pain over the last few months.      Medication Reconciliation: complete  Marianna Grijalva LPN

## 2020-10-23 ENCOUNTER — HOSPITAL ENCOUNTER (OUTPATIENT)
Dept: MRI IMAGING | Facility: OTHER | Age: 65
End: 2020-10-23
Attending: FAMILY MEDICINE
Payer: COMMERCIAL

## 2020-10-23 DIAGNOSIS — H91.91 ACUTE HEARING LOSS OF RIGHT EAR: ICD-10-CM

## 2020-10-23 DIAGNOSIS — R42 VERTIGO: ICD-10-CM

## 2020-10-23 DIAGNOSIS — H93.A9 SUBJECTIVE PULSATILE TINNITUS: ICD-10-CM

## 2020-10-23 PROCEDURE — 255N000002 HC RX 255 OP 636: Performed by: FAMILY MEDICINE

## 2020-10-23 PROCEDURE — 70549 MR ANGIOGRAPH NECK W/O&W/DYE: CPT

## 2020-10-23 PROCEDURE — 70543 MRI ORBT/FAC/NCK W/O &W/DYE: CPT

## 2020-10-23 PROCEDURE — 70544 MR ANGIOGRAPHY HEAD W/O DYE: CPT

## 2020-10-23 PROCEDURE — A9575 INJ GADOTERATE MEGLUMI 0.1ML: HCPCS | Performed by: FAMILY MEDICINE

## 2020-10-23 RX ORDER — GADOTERATE MEGLUMINE 376.9 MG/ML
15 INJECTION INTRAVENOUS ONCE
Status: COMPLETED | OUTPATIENT
Start: 2020-10-23 | End: 2020-10-23

## 2020-10-23 RX ADMIN — GADOTERATE MEGLUMINE 15 ML: 376.9 INJECTION INTRAVENOUS at 11:56

## 2020-11-17 ENCOUNTER — NURSE TRIAGE (OUTPATIENT)
Dept: FAMILY MEDICINE | Facility: OTHER | Age: 65
End: 2020-11-17

## 2020-11-17 NOTE — TELEPHONE ENCOUNTER
"S-(situation): Patient call with concern of medication reaction.    B-(background): seen 10/21/2020 Dr. Walters for ear ache concern, deemed no infection of ear. Patient prescribed 10/26/2020 Clindamycin 300 mg 3 times daily for 10 days per dentist for tooth abscess.     A-(assessment): Patient states began antibiotic 10/26/2020 felt \"rough on stomach.\" Noticed on last day of antibiotic sore throat. Finished 10 day course of Rx. Call then made to dentist regarding Rx, was prescribed another 10 day course of same antibiotic prior to root canal on 12/07/2020. Began 2nd series of antibiotic 11/16/2020. Patient states left side of throat became swollen and increasingly painful. Took night time dose of Rx with increase amount of water and woke up in night to increase in pain, swelling in throat and sensation of breathing difficulty. Patient states \"had to calm self down, symptoms improved.\" Did not take dose of antibiotic this morning, symptoms remain improved. Denies breathing difficulty at this time. Denies fever and rash. Patient states \"back of neck is tender but unsure if that is related to the way  I was sleeping.\" Rating current throat pain 5/10 only when swallowing. Denies ear pain, stating right ear is \"tender\". Denies current swelling in neck, lymph nod swelling. Patient has not taken anything for throat pain. Patient has not had contact with dentist post episode of symptoms on 11/16/2020-11/17/2020.    R-(recommendations): Per protocol, recommendation that patient be seen in 24 hours due to ear pain. Dicussed with patient to primarily contact dentist to inform of symptoms and patient stopping antibiotic. Patient states will continue to try to contact dental office today. Dicussed with patient emergent signs of pain and breathing difficulty to present to ED. Dicussed utilizing OTC pain medication and warm water salt gargling to relieve throat soreness. Patient advised to return call or make clinic appointment " if symptoms persists or worsen. Patient verbalized understanding. Will await recommendations per dental office and call back with questions, if necessary. Patient has no further concerns at this time.     Additional Information    Earache also present    Sore throat (throat pain with swallowing)    Protocols used: SORE THROAT-A-FEDERICO, SWALLOWING DIFFICULTY-A-  Sima Granado RN ....................  11/17/2020   11:08 AM

## 2020-12-04 ENCOUNTER — OFFICE VISIT (OUTPATIENT)
Dept: FAMILY MEDICINE | Facility: OTHER | Age: 65
End: 2020-12-04
Attending: FAMILY MEDICINE
Payer: MEDICARE

## 2020-12-04 VITALS
HEIGHT: 64 IN | WEIGHT: 135.8 LBS | BODY MASS INDEX: 23.18 KG/M2 | OXYGEN SATURATION: 99 % | RESPIRATION RATE: 16 BRPM | TEMPERATURE: 98.4 F | HEART RATE: 84 BPM | DIASTOLIC BLOOD PRESSURE: 64 MMHG | SYSTOLIC BLOOD PRESSURE: 128 MMHG

## 2020-12-04 DIAGNOSIS — E28.319 PREMATURE MENOPAUSE ON HRT: ICD-10-CM

## 2020-12-04 DIAGNOSIS — R79.89 LOW VITAMIN D LEVEL: ICD-10-CM

## 2020-12-04 DIAGNOSIS — E67.3 HYPERVITAMINOSIS D: ICD-10-CM

## 2020-12-04 DIAGNOSIS — E04.1 THYROID NODULE: ICD-10-CM

## 2020-12-04 DIAGNOSIS — Z79.890 PREMATURE MENOPAUSE ON HRT: ICD-10-CM

## 2020-12-04 DIAGNOSIS — E04.9 NODULAR GOITER, NON-TOXIC: Primary | ICD-10-CM

## 2020-12-04 LAB
DEPRECATED CALCIDIOL+CALCIFEROL SERPL-MC: 37.4 NG/ML
T3 SERPL-MCNC: 103 NG/DL (ref 87–178)
T4 FREE SERPL-MCNC: 0.73 NG/DL (ref 0.6–1.6)
TSH SERPL DL<=0.05 MIU/L-ACNC: 4.89 IU/ML (ref 0.34–5.6)

## 2020-12-04 PROCEDURE — 82306 VITAMIN D 25 HYDROXY: CPT | Mod: ZL | Performed by: FAMILY MEDICINE

## 2020-12-04 PROCEDURE — 84439 ASSAY OF FREE THYROXINE: CPT | Mod: ZL | Performed by: FAMILY MEDICINE

## 2020-12-04 PROCEDURE — 36415 COLL VENOUS BLD VENIPUNCTURE: CPT | Mod: ZL | Performed by: FAMILY MEDICINE

## 2020-12-04 PROCEDURE — 84480 ASSAY TRIIODOTHYRONINE (T3): CPT | Mod: ZL | Performed by: FAMILY MEDICINE

## 2020-12-04 PROCEDURE — 84443 ASSAY THYROID STIM HORMONE: CPT | Mod: ZL | Performed by: FAMILY MEDICINE

## 2020-12-04 PROCEDURE — G0463 HOSPITAL OUTPT CLINIC VISIT: HCPCS

## 2020-12-04 PROCEDURE — 99214 OFFICE O/P EST MOD 30 MIN: CPT | Performed by: FAMILY MEDICINE

## 2020-12-04 RX ORDER — ESTERIFIED ESTROGEN AND METHYLTESTOSTERONE 1.25; 2.5 MG/1; MG/1
1 TABLET ORAL DAILY
Qty: 90 TABLET | Refills: 3 | Status: SHIPPED | OUTPATIENT
Start: 2020-12-04 | End: 2020-12-07

## 2020-12-04 ASSESSMENT — ENCOUNTER SYMPTOMS
TROUBLE SWALLOWING: 0
SHORTNESS OF BREATH: 0
NECK PAIN: 0
UNEXPECTED WEIGHT CHANGE: 0
BACK PAIN: 0
DIAPHORESIS: 0

## 2020-12-04 ASSESSMENT — PAIN SCALES - GENERAL: PAINLEVEL: NO PAIN (0)

## 2020-12-04 ASSESSMENT — MIFFLIN-ST. JEOR: SCORE: 1150.98

## 2020-12-04 NOTE — NURSING NOTE
Patient presents today for follow up on ultrasound and pituitary gland.    Medication Reconciliation Complete    Marquita Pedersen LPN  12/4/2020 9:21 AM

## 2020-12-04 NOTE — PROGRESS NOTES
SUBJECTIVE:   Miguelina Estrada is a 64 year old female who presents to clinic today for the following health issues:    HPI  Follow up on thyroid. She had a brain MRI in October.  Showed possible pituitary nodule.   In  June 2019, thyroid ultrasound showed a multinodular goiter, with yearly follow up advised.  Has no symptoms at all most of the time , but perhaps has some mild dysphagia.       She had been told by her chiropractor to take 5000 units of vit d daily.  While on this she felt poorly and stopped it.    Recent oral abscess, was given clinda, caused some throat swelling and shortness of breath.  Got Keflex next without a reaction.     Is on estratest, has been on half tabs for over a year.  Notes a bit more vaginal symptoms on the lower dose.  Is really worried about her bone health.     Past Medical History:   Diagnosis Date     Asymptomatic premature menopause     mid 30s     Cardiac arrhythmia     No Comments Provided     Diaphragmatic hernia without obstruction or gangrene     No Comments Provided     Hypoglycemia     episodes of hypoglycemia if not eating     Paroxysmal atrial fibrillation (H) 5/18/2018     Pupillary abnormality of both eyes     1993,Palmira's tonic pupil (bilateral)      Past Surgical History:   Procedure Laterality Date     COLONOSCOPY  08/11/2017    Done by Dr. Barbara Goss     ENDOSCOPY UPPER, COLONOSCOPY, COMBINED      8/11/2017     ESOPHAGOSCOPY, GASTROSCOPY, DUODENOSCOPY (EGD), COMBINED      2009     HYSTERECTOMY TOTAL ABDOMINAL, BILATERAL SALPINGO-OOPHORECTOMY, COMBINED      2003     LAPAROSCOPIC CHOLECYSTECTOMY      2007     LAPAROSCOPIC TUBAL LIGATION      1980s     Social History     Tobacco Use     Smoking status: Never Smoker     Smokeless tobacco: Never Used   Substance Use Topics     Alcohol use: No     Alcohol/week: 0.0 standard drinks     Current Outpatient Medications   Medication Sig Dispense Refill     acyclovir (ZOVIRAX) 400 MG tablet TAKE 1 TABLET BY MOUTH TWICE  "A  tablet 3     ASPIRIN ADULT LOW STRENGTH 81 MG EC tablet TAKE 1 TABLET (81 MG) BY MOUTH DAILY 90 tablet 2     estrogens-methylTESTOSTERone (ESTRATEST) 1.25-2.5 MG per tablet Take 1 tablet by mouth daily 90 tablet 3     Lysine HCl (RA L-LYSINE) 1000 MG TABS Take 1,000 mg by mouth daily       metroNIDAZOLE (METROGEL) 0.75 % external gel Apply topically 2 times daily 45 g 3     sucralfate (CARAFATE) 1 GM tablet Take 1 tablet (1 g) by mouth 4 times daily as needed 120 tablet 3     trifluridine (VIROPTIC) 1 % ophthalmic solution Place 1 drop into both eyes as needed  0     Allergies   Allergen Reactions     Clindamycin Difficulty breathing     Erythromycin Nausea and Vomiting     Levofloxacin Other (See Comments) and Palpitations     Penicillins Rash     Sulfa Drugs Rash       Review of Systems   Constitutional: Negative for diaphoresis and unexpected weight change.   HENT: Negative for trouble swallowing.    Respiratory: Negative for shortness of breath.    Cardiovascular: Negative for chest pain.   Musculoskeletal: Negative for back pain and neck pain.        OBJECTIVE:     /64   Pulse 84   Temp 98.4  F (36.9  C)   Resp 16   Ht 1.626 m (5' 4\")   Wt 61.6 kg (135 lb 12.8 oz)   SpO2 99%   BMI 23.31 kg/m    Body mass index is 23.31 kg/m .  Physical Exam  Constitutional:       Appearance: Normal appearance.   Neck:      Comments: No thyroid pain on palpation, no palpable enlargement.  Cardiovascular:      Rate and Rhythm: Normal rate and regular rhythm.      Heart sounds: No murmur. No friction rub. No gallop.    Pulmonary:      Effort: Pulmonary effort is normal. No respiratory distress.      Breath sounds: No stridor.   Neurological:      Mental Status: She is alert.   Psychiatric:         Mood and Affect: Mood normal.         Behavior: Behavior normal.         Thought Content: Thought content normal.             ASSESSMENT/PLAN:         (E04.9) Nodular goiter, non-toxic  (primary encounter " diagnosis)  Comment: stable  Plan: US Thyroid, TSH, T3, Total, T4, Free        Follow up scan ordered.  with pituitary issues, possible, will have to also check T3 and free t4.    (E04.1) Thyroid nodule  Comment: see above  Plan:          (E67.3) Hypervitaminosis D   Comment: the high dose has the risk of overdosing, she has stopped this already, will get a level.  Plan: Vitamin D Total             (E28.319,  Z79.890) Premature menopause on HRT  Comment: stable, long discussion with her about risks of breast cancer, but low, balanced with significant improvement in vaginal dryness and libido. She has elected to stay on it.    Plan: estrogens-methylTESTOSTERone (ESTRATEST)         1.25-2.5 MG per tablet                 Fady Oconnor MD  [facility]

## 2020-12-07 DIAGNOSIS — E28.319 PREMATURE MENOPAUSE ON HRT: ICD-10-CM

## 2020-12-07 DIAGNOSIS — Z79.890 PREMATURE MENOPAUSE ON HRT: ICD-10-CM

## 2020-12-07 RX ORDER — ESTERIFIED ESTROGEN AND METHYLTESTOSTERONE 1.25; 2.5 MG/1; MG/1
1 TABLET ORAL DAILY
Qty: 90 TABLET | Refills: 3 | Status: SHIPPED | OUTPATIENT
Start: 2020-12-07 | End: 2021-06-28

## 2020-12-09 ENCOUNTER — HOSPITAL ENCOUNTER (OUTPATIENT)
Dept: ULTRASOUND IMAGING | Facility: OTHER | Age: 65
Discharge: HOME | End: 2020-12-09
Attending: FAMILY MEDICINE | Admitting: FAMILY MEDICINE
Payer: MEDICARE

## 2020-12-09 DIAGNOSIS — E04.9 NODULAR GOITER, NON-TOXIC: ICD-10-CM

## 2020-12-09 PROCEDURE — 76536 US EXAM OF HEAD AND NECK: CPT

## 2020-12-17 ENCOUNTER — ALLIED HEALTH/NURSE VISIT (OUTPATIENT)
Dept: FAMILY MEDICINE | Facility: OTHER | Age: 65
End: 2020-12-17
Attending: PHYSICAL MEDICINE & REHABILITATION
Payer: MEDICARE

## 2020-12-17 DIAGNOSIS — Z23 NEED FOR VACCINATION: Primary | ICD-10-CM

## 2020-12-17 PROCEDURE — G0009 ADMIN PNEUMOCOCCAL VACCINE: HCPCS

## 2020-12-17 NOTE — PROGRESS NOTES
Immunization: Adult  Verified patient's name and . Stated reason for visit today is to receive pneumonia vaccine(s). Denied any concerns with previous immunizations. Allergies reviewed.  Screening Questionnaire Adult Immunization completed (see below). VIS handout(s) reviewed and given to take home. Pneumovax 23 prepared and administered per standing order. Administration documented in IMMUNIZATIONS (see MIIC and order for further information).     Screening Questionnaire for Adult Immunization    Are you sick today?   No   Do you have allergies to vaccines or vaccine components?   No   Have you ever had a serious reaction after receiving a vaccination?   No   Have you received any vaccinations in the past 4 weeks?   No     Immunization questionnaire answers were all negative.      Yvonne Ovalle BSN, RN on 2020 at 1:01 PM

## 2021-06-28 ENCOUNTER — TELEPHONE (OUTPATIENT)
Dept: FAMILY MEDICINE | Facility: OTHER | Age: 66
End: 2021-06-28

## 2021-06-28 DIAGNOSIS — E28.319 PREMATURE MENOPAUSE ON HRT: ICD-10-CM

## 2021-06-28 DIAGNOSIS — Z79.890 PREMATURE MENOPAUSE ON HRT: ICD-10-CM

## 2021-06-28 RX ORDER — ESTERIFIED ESTROGEN AND METHYLTESTOSTERONE 1.25; 2.5 MG/1; MG/1
TABLET ORAL
Qty: 90 TABLET | Refills: 1 | OUTPATIENT
Start: 2021-06-28

## 2021-06-28 RX ORDER — ESTERIFIED ESTROGEN AND METHYLTESTOSTERONE 1.25; 2.5 MG/1; MG/1
TABLET ORAL
Qty: 90 TABLET | OUTPATIENT
Start: 2021-06-28

## 2021-06-28 RX ORDER — ESTERIFIED ESTROGEN AND METHYLTESTOSTERONE 1.25; 2.5 MG/1; MG/1
1 TABLET ORAL DAILY
Qty: 90 TABLET | Refills: 3 | Status: SHIPPED | OUTPATIENT
Start: 2021-06-28 | End: 2021-06-29

## 2021-06-28 NOTE — TELEPHONE ENCOUNTER
[facility] sent Rx request for the following:   estrogens-methylTESTOSTERone (ESTRATEST) 1.25-2.5 MG per tablet  Sig: TAKE 1 TABLET BY MOUTH EVERY DAY    Last Prescription Date:   12/07/2020  Last Fill Qty/Refills:         90, R-3      Rx is only good for 6-months per regulation law. Routing for Provider review and consideration of Rx.    Unable to complete prescription refill per RN Medication Refill Policy.................... Sima Byrnes RN ....................  6/28/2021   11:47 AM

## 2021-06-28 NOTE — TELEPHONE ENCOUNTER
RX Estrogen  This was denied.  Its .  Can the pharmacy get a new one?   The one done in 2020 .  They  in 6 months.  Asha Diaz on 2021 at 11:55 AM

## 2021-06-29 RX ORDER — ESTERIFIED ESTROGEN AND METHYLTESTOSTERONE 1.25; 2.5 MG/1; MG/1
1 TABLET ORAL DAILY
Qty: 90 TABLET | Refills: 3 | Status: SHIPPED | OUTPATIENT
Start: 2021-06-29 | End: 2022-06-28

## 2021-07-12 ENCOUNTER — OFFICE VISIT (OUTPATIENT)
Dept: FAMILY MEDICINE | Facility: OTHER | Age: 66
End: 2021-07-12
Attending: PHYSICIAN ASSISTANT
Payer: MEDICARE

## 2021-07-12 VITALS
SYSTOLIC BLOOD PRESSURE: 138 MMHG | BODY MASS INDEX: 23.58 KG/M2 | TEMPERATURE: 101.2 F | RESPIRATION RATE: 16 BRPM | HEART RATE: 104 BPM | OXYGEN SATURATION: 99 % | WEIGHT: 137.4 LBS | DIASTOLIC BLOOD PRESSURE: 70 MMHG

## 2021-07-12 DIAGNOSIS — R42 DIZZINESS: ICD-10-CM
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
## 2021-07-12 DIAGNOSIS — R50.9 FEVER, UNSPECIFIED FEVER CAUSE: ICD-10-CM

## 2021-07-12 LAB
ALBUMIN SERPL-MCNC: 4.1 G/DL (ref 3.5–5.7)
ALP SERPL-CCNC: 54 U/L (ref 34–104)
ALT SERPL W P-5'-P-CCNC: 30 U/L (ref 7–52)
ANION GAP SERPL CALCULATED.3IONS-SCNC: 4 MMOL/L (ref 3–14)
AST SERPL W P-5'-P-CCNC: 32 U/L (ref 13–39)
BASOPHILS # BLD AUTO: 0 10E3/UL (ref 0–0.2)
BASOPHILS NFR BLD AUTO: 1 %
BILIRUB SERPL-MCNC: 0.5 MG/DL (ref 0.3–1)
BUN SERPL-MCNC: 8 MG/DL (ref 7–25)
CALCIUM SERPL-MCNC: 9.1 MG/DL (ref 8.6–10.3)
CHLORIDE BLD-SCNC: 100 MMOL/L (ref 98–107)
CO2 SERPL-SCNC: 29 MMOL/L (ref 21–31)
CREAT SERPL-MCNC: 0.84 MG/DL (ref 0.6–1.2)
EOSINOPHIL # BLD AUTO: 0 10E3/UL (ref 0–0.7)
EOSINOPHIL NFR BLD AUTO: 0 %
ERYTHROCYTE [DISTWIDTH] IN BLOOD BY AUTOMATED COUNT: 12.8 % (ref 10–15)
GFR SERPL CREATININE-BSD FRML MDRD: 73 ML/MIN/1.73M2
GLUCOSE BLD-MCNC: 126 MG/DL (ref 70–105)
HCT VFR BLD AUTO: 40 % (ref 35–47)
HGB BLD-MCNC: 13.3 G/DL (ref 11.7–15.7)
IMM GRANULOCYTES # BLD: 0 10E3/UL
IMM GRANULOCYTES NFR BLD: 0 %
LYMPHOCYTES # BLD AUTO: 0.6 10E3/UL (ref 0.8–5.3)
LYMPHOCYTES NFR BLD AUTO: 13 %
MCH RBC QN AUTO: 31.1 PG (ref 26.5–33)
MCHC RBC AUTO-ENTMCNC: 33.3 G/DL (ref 31.5–36.5)
MCV RBC AUTO: 94 FL (ref 78–100)
MONOCYTES # BLD AUTO: 0.4 10E3/UL (ref 0–1.3)
MONOCYTES NFR BLD AUTO: 9 %
NEUTROPHILS # BLD AUTO: 3.4 10E3/UL (ref 1.6–8.3)
NEUTROPHILS NFR BLD AUTO: 77 %
NRBC # BLD AUTO: 0 10E3/UL
NRBC BLD AUTO-RTO: 0 /100
PLATELET # BLD AUTO: 165 10E3/UL (ref 150–450)
POTASSIUM BLD-SCNC: 3.8 MMOL/L (ref 3.5–5.1)
PROT SERPL-MCNC: 7 G/DL (ref 6.4–8.9)
RBC # BLD AUTO: 4.27 10E6/UL (ref 3.8–5.2)
SARS-COV-2 RNA RESP QL NAA+PROBE: NEGATIVE
SODIUM SERPL-SCNC: 133 MMOL/L (ref 134–144)
WBC # BLD AUTO: 4.4 10E3/UL (ref 4–11)

## 2021-07-12 PROCEDURE — 87798 DETECT AGENT NOS DNA AMP: CPT | Mod: ZL | Performed by: PHYSICIAN ASSISTANT

## 2021-07-12 PROCEDURE — 36415 COLL VENOUS BLD VENIPUNCTURE: CPT | Mod: ZL | Performed by: PHYSICIAN ASSISTANT

## 2021-07-12 PROCEDURE — 80053 COMPREHEN METABOLIC PANEL: CPT | Mod: ZL | Performed by: PHYSICIAN ASSISTANT

## 2021-07-12 PROCEDURE — C9803 HOPD COVID-19 SPEC COLLECT: HCPCS

## 2021-07-12 PROCEDURE — 85025 COMPLETE CBC W/AUTO DIFF WBC: CPT | Mod: ZL | Performed by: PHYSICIAN ASSISTANT

## 2021-07-12 PROCEDURE — G0463 HOSPITAL OUTPT CLINIC VISIT: HCPCS

## 2021-07-12 PROCEDURE — 99213 OFFICE O/P EST LOW 20 MIN: CPT | Performed by: PHYSICIAN ASSISTANT

## 2021-07-12 PROCEDURE — 86618 LYME DISEASE ANTIBODY: CPT | Mod: ZL | Performed by: PHYSICIAN ASSISTANT

## 2021-07-12 PROCEDURE — U0003 INFECTIOUS AGENT DETECTION BY NUCLEIC ACID (DNA OR RNA); SEVERE ACUTE RESPIRATORY SYNDROME CORONAVIRUS 2 (SARS-COV-2) (CORONAVIRUS DISEASE [COVID-19]), AMPLIFIED PROBE TECHNIQUE, MAKING USE OF HIGH THROUGHPUT TECHNOLOGIES AS DESCRIBED BY CMS-2020-01-R: HCPCS | Mod: ZL | Performed by: PHYSICIAN ASSISTANT

## 2021-07-12 RX ORDER — DOXYCYCLINE HYCLATE 100 MG
100 TABLET ORAL 2 TIMES DAILY
Qty: 28 TABLET | Refills: 0 | Status: SHIPPED | OUTPATIENT
Start: 2021-07-12 | End: 2021-07-26

## 2021-07-12 ASSESSMENT — PAIN SCALES - GENERAL: PAINLEVEL: MODERATE PAIN (5)

## 2021-07-12 NOTE — PROGRESS NOTES
Nursing Notes:   Kitty Glynn LPN  7/12/2021 10:58 AM  Signed  Chief Complaint   Patient presents with     Fever     tick bite June 19th     Generalized Body Aches     Fatigue         Medication Reconciliation: complete    Kitty Glynn LPN        HPI:    Miguelina Estrada is a 65 year old female who presents for tick bite concerns.  Patient states that she was bit by a tick on 6/19/2021 while camping.  Unsure what type of tick it was.  History of Lyme's disease in the past.  Over the last week patient has been struggling with fevers, fatigue, and body aches.  Up-to-date on her Covid-19 vaccine.  No sinus congestion, cough or cold symptoms.  1 week ago she noticed she was more dizzy and developed a headache.  Has felt fatigued.  The next day she did not feel well after shopping.  On 7/9 her temperature was 99.4.  Since then she has had an elevated fever.  She has been having body aches including hand, feet, and hip pain.  The glands in her chest hurt.  Her neck and headache has been bad.  History of similar symptoms with Lyme's.  Recently had an irritated throat however she attributes this to a flareup of reflux with eating poorly while camping.  This is calmed down since eating better.  Feels like her lymph nodes have been irritated.  Nausea but no vomiting.  No diarrhea.  Drinking a lot of water.  Having urinary frequency due to trying to increase fluid intake.  No rashes or bulls eye lesion.  Tick bite was on her left lower leg.  No pus or drainage.    Past Medical History:   Diagnosis Date     Asymptomatic premature menopause     mid 30s     Cardiac arrhythmia     No Comments Provided     Diaphragmatic hernia without obstruction or gangrene     No Comments Provided     Hypoglycemia     episodes of hypoglycemia if not eating     Paroxysmal atrial fibrillation (H) 5/18/2018     Pupillary abnormality of both eyes     1993,Palmira's tonic pupil (bilateral)       Past Surgical History:    Procedure Laterality Date     COLONOSCOPY  08/11/2017    Done by Dr. Barbara Goss     ENDOSCOPY UPPER, COLONOSCOPY, COMBINED      8/11/2017     ESOPHAGOSCOPY, GASTROSCOPY, DUODENOSCOPY (EGD), COMBINED      2009     HYSTERECTOMY TOTAL ABDOMINAL, BILATERAL SALPINGO-OOPHORECTOMY, COMBINED      2003     LAPAROSCOPIC CHOLECYSTECTOMY      2007     LAPAROSCOPIC TUBAL LIGATION      1980s       Family History   Problem Relation Age of Onset     Diabetes Father 60        Diabetes     Other - See Comments Father 55        Atrial fibrillation     Arrhythmia Father      Hypertension Mother         Hypertension     Heart Disease Mother 86        Heart Disease,silent MI, CHF     Coronary Artery Disease Mother 86        MI  Afib     Arrhythmia Mother      Hypertension Brother         Hypertension     Breast Cancer No family hx of         Cancer-breast       Social History     Tobacco Use     Smoking status: Never Smoker     Smokeless tobacco: Never Used   Substance Use Topics     Alcohol use: No     Alcohol/week: 0.0 standard drinks       Current Outpatient Medications   Medication Sig Dispense Refill     acyclovir (ZOVIRAX) 400 MG tablet TAKE 1 TABLET BY MOUTH TWICE A  tablet 3     ASPIRIN ADULT LOW STRENGTH 81 MG EC tablet TAKE 1 TABLET (81 MG) BY MOUTH DAILY 90 tablet 2     doxycycline hyclate (VIBRA-TABS) 100 MG tablet Take 1 tablet (100 mg) by mouth 2 times daily for 14 days 28 tablet 0     estrogens-methylTESTOSTERone (ESTRATEST) 1.25-2.5 MG per tablet Take 1 tablet by mouth daily 90 tablet 3     Lysine HCl (RA L-LYSINE) 1000 MG TABS Take 1,000 mg by mouth daily       metroNIDAZOLE (METROGEL) 0.75 % external gel Apply topically 2 times daily 45 g 3     sucralfate (CARAFATE) 1 GM tablet Take 1 tablet (1 g) by mouth 4 times daily as needed 120 tablet 3     trifluridine (VIROPTIC) 1 % ophthalmic solution Place 1 drop into both eyes as needed  0       Allergies   Allergen Reactions     Clindamycin Difficulty breathing      Erythromycin Nausea and Vomiting     Levofloxacin Other (See Comments) and Palpitations     Penicillins Rash     Sulfa Drugs Rash       REVIEW OF SYSTEMS:  Refer to HPI.    EXAM:   Vitals:    /70 (BP Location: Right arm, Patient Position: Sitting, Cuff Size: Adult Regular)   Pulse 104   Temp 101.2  F (38.4  C)   Resp 16   Wt 62.3 kg (137 lb 6.4 oz)   SpO2 99%   BMI 23.58 kg/m      General Appearance: Pleasant, alert, appropriate appearance for age. No acute distress  Ear Exam: Normal TM's bilaterally, normal grey, and translucent. Normal auditory canals and external ears. Non-tender.   OroPharynx Exam: Dental hygiene adequate. Normal buccal mucosa. Normal pharynx.  Neck Exam:  Supple, no masses or nodes.  Chest/Respiratory Exam: Normal chest wall and respirations. Clear to auscultation.  Cardiovascular Exam: Regular rate and rhythm. S1, S2, no murmur, click, gallop, or rubs.  Gastrointestinal Exam: Soft, non-tender, no masses or organomegaly. Normal BS x 4.  Musculoskeletal Exam: Back is straight, full ROM of upper and lower extremities.  No spinal pain to palpation.  Mild cervical paraspinous muscle pain to palpation bilaterally.  No bruising or swelling appreciated.  Cervicalgia with neck flexion.  Skin: no rash or abnormalities.  Small minimally erythematous, healing lesion appreciated on left lower lateral leg.  Neurologic Exam: Nonfocal, normal gross motor, tone coordination and no tremor.  Psychiatric Exam: Alert and oriented - appropriate affect.    PHQ Depression Screen  PHQ-9 SCORE 4/20/2018 8/4/2020   PHQ-9 Total Score 3 0       ASSESSMENT AND PLAN:      ICD-10-CM    1. Tick bite, initial encounter  W57.XXXA doxycycline hyclate (VIBRA-TABS) 100 MG tablet     Lyme Disease Ab with reflex to WB Serum     Ehrlichia Anaplasma Sp by PCR     CBC and Differential     Comprehensive Metabolic Panel     Lyme Disease Ab with reflex to WB Serum     Ehrlichia Anaplasma Sp by PCR     CBC and Differential      Comprehensive Metabolic Panel   2. Dizziness  R42 doxycycline hyclate (VIBRA-TABS) 100 MG tablet     Lyme Disease Ab with reflex to WB Serum     Ehrlichia Anaplasma Sp by PCR     CBC and Differential     Comprehensive Metabolic Panel     Lyme Disease Ab with reflex to WB Serum     Ehrlichia Anaplasma Sp by PCR     CBC and Differential     Comprehensive Metabolic Panel   3. Fever, unspecified fever cause  R50.9 Symptomatic COVID-19 Virus (Coronavirus) by PCR Nasopharyngeal         Complete labs to rule out concerns including CBC, CMP, Lyme's, ehrlichiosis and anaplasmosis.  Labs are pending.  Patient was started on doxycycline for treatment of a tickborne illness.  Gave side effect profile.  Completed Covid-19 to rule out infection concerns.    Monitor for fevers, chills, fatigue, bulls eye lesion, increased or spreading redness around the bite wilmer, body aches or pains, joint pain, swelling, stomach concerns, pus, drainage, heart problems such as a slowed heart rate, headache, stiff neck, feeling of pain, weakness or numbness. Return to clinic with change/worsening of symptoms. Gave warning signs/symptoms. Gave tick treatment.    Discussed symptoms of lymes and instructed to be seen and evaluated if they develop. Encouraged prevention with use of mosquito spray with DEET and checking for ticks nightly. Instructed to return if not improving.       Patient Instructions   Monitor for fevers, chills, fatigue, bulls eye lesion, increased or spreading redness around the bite wilmer, body aches or pains, joint pain, swelling, stomach concerns, pus, drainage, heart problems such as a slowed heart rate, headache, stiff neck, feeling of pain, weakness or numbness. Return to clinic with change/worsening of symptoms. Gave warning signs/symptoms. Gave tick treatment.    Discussed symptoms of lymes and instructed to be seen and evaluated if they develop. Encouraged prevention with use of mosquito spray with DEET and checking for  ticks nightly. Instructed to return if not improving.          Asha Ley PA-C PA-C..................7/12/2021 10:57 AM

## 2021-07-12 NOTE — NURSING NOTE
Chief Complaint   Patient presents with     Fever     tick bite June 19th     Generalized Body Aches     Fatigue         Medication Reconciliation: complete    Kitty Glynn, LPN

## 2021-07-12 NOTE — PATIENT INSTRUCTIONS
Monitor for fevers, chills, fatigue, bulls eye lesion, increased or spreading redness around the bite wilmer, body aches or pains, joint pain, swelling, stomach concerns, pus, drainage, heart problems such as a slowed heart rate, headache, stiff neck, feeling of pain, weakness or numbness. Return to clinic with change/worsening of symptoms. Gave warning signs/symptoms. Gave tick treatment.    Discussed symptoms of lymes and instructed to be seen and evaluated if they develop. Encouraged prevention with use of mosquito spray with DEET and checking for ticks nightly. Instructed to return if not improving.

## 2021-07-13 LAB — B BURGDOR IGG+IGM SER QL: 0.06

## 2021-07-16 LAB
A PHAGOCYTOPH DNA BLD QL NAA+PROBE: NOT DETECTED
E CHAFFEENSIS DNA BLD QL NAA+PROBE: NOT DETECTED
E EWINGII DNA SPEC QL NAA+PROBE: NOT DETECTED
EHRLICHIA DNA SPEC QL NAA+PROBE: NOT DETECTED

## 2021-08-14 ENCOUNTER — HEALTH MAINTENANCE LETTER (OUTPATIENT)
Age: 66
End: 2021-08-14

## 2021-08-30 DIAGNOSIS — B02.30 OPHTHALMIC HERPES ZOSTER: ICD-10-CM

## 2021-08-31 RX ORDER — ACYCLOVIR 400 MG/1
TABLET ORAL
Qty: 180 TABLET | Refills: 0 | Status: SHIPPED | OUTPATIENT
Start: 2021-08-31 | End: 2021-11-23

## 2021-08-31 NOTE — TELEPHONE ENCOUNTER
"[facility] sent Rx request for the following:   acyclovir (ZOVIRAX) 400 MG tablet   Sig Take 1 tablet by mouth twice daily    Last Prescription Date:   08/05/2020  Last Fill Qty/Refills:         180, R-3    Last Office Visit:              07/12/2021 (Oja)   Future Office visit:           None noted   Antivirals for Herpes Protocol Passed - 8/30/2021 11:03 AM        Passed - Patient is age 12 or older        Passed - Recent (12 mo) or future (30 days) visit within the authorizing provider's specialty     Patient has had an office visit with the authorizing provider or a provider within the authorizing providers department within the previous 12 mos or has a future within next 30 days. See \"Patient Info\" tab in inbasket, or \"Choose Columns\" in Meds & Orders section of the refill encounter.              Passed - Medication is active on med list        Passed - Normal serum creatinine on file in past 12 months     Recent Labs   Lab Test 07/12/21  1126   CR 0.84       Ok to refill medication if creatinine is low           Prescription approved per [facility] Refill Protocol.  Sima Byrnes RN ....................  8/31/2021   4:50 PM      "

## 2021-09-21 ENCOUNTER — HOSPITAL ENCOUNTER (OUTPATIENT)
Dept: MAMMOGRAPHY | Facility: OTHER | Age: 66
Discharge: HOME | End: 2021-09-21
Attending: FAMILY MEDICINE | Admitting: FAMILY MEDICINE
Payer: MEDICARE

## 2021-09-21 DIAGNOSIS — Z12.31 VISIT FOR SCREENING MAMMOGRAM: ICD-10-CM

## 2021-09-21 PROCEDURE — 77063 BREAST TOMOSYNTHESIS BI: CPT

## 2021-10-09 ENCOUNTER — HEALTH MAINTENANCE LETTER (OUTPATIENT)
Age: 66
End: 2021-10-09

## 2021-11-22 DIAGNOSIS — B02.30 OPHTHALMIC HERPES ZOSTER: ICD-10-CM

## 2021-11-23 RX ORDER — ACYCLOVIR 400 MG/1
TABLET ORAL
Qty: 180 TABLET | Refills: 0 | Status: SHIPPED | OUTPATIENT
Start: 2021-11-23 | End: 2022-01-07

## 2022-01-07 ENCOUNTER — TELEPHONE (OUTPATIENT)
Dept: FAMILY MEDICINE | Facility: OTHER | Age: 67
End: 2022-01-07

## 2022-01-07 ENCOUNTER — OFFICE VISIT (OUTPATIENT)
Dept: FAMILY MEDICINE | Facility: OTHER | Age: 67
End: 2022-01-07
Attending: FAMILY MEDICINE
Payer: COMMERCIAL

## 2022-01-07 VITALS
BODY MASS INDEX: 23.39 KG/M2 | HEART RATE: 74 BPM | OXYGEN SATURATION: 99 % | WEIGHT: 140.4 LBS | RESPIRATION RATE: 14 BRPM | DIASTOLIC BLOOD PRESSURE: 72 MMHG | HEIGHT: 65 IN | TEMPERATURE: 97.2 F | SYSTOLIC BLOOD PRESSURE: 130 MMHG

## 2022-01-07 DIAGNOSIS — Z13.220 LIPID SCREENING: ICD-10-CM

## 2022-01-07 DIAGNOSIS — I48.0 PAROXYSMAL ATRIAL FIBRILLATION (H): Primary | ICD-10-CM

## 2022-01-07 DIAGNOSIS — B02.30 OPHTHALMIC HERPES ZOSTER: ICD-10-CM

## 2022-01-07 DIAGNOSIS — Z13.1 SCREENING FOR DIABETES MELLITUS: ICD-10-CM

## 2022-01-07 DIAGNOSIS — I48.0 PAROXYSMAL ATRIAL FIBRILLATION (H): ICD-10-CM

## 2022-01-07 DIAGNOSIS — Z00.00 ENCOUNTER FOR MEDICARE ANNUAL WELLNESS EXAM: Primary | ICD-10-CM

## 2022-01-07 LAB
CHOLEST SERPL-MCNC: 195 MG/DL
ERYTHROCYTE [DISTWIDTH] IN BLOOD BY AUTOMATED COUNT: 12.9 % (ref 10–15)
FASTING STATUS PATIENT QL REPORTED: YES
FASTING STATUS PATIENT QL REPORTED: YES
GLUCOSE BLD-MCNC: 87 MG/DL (ref 70–105)
HCT VFR BLD AUTO: 43.5 % (ref 35–47)
HDLC SERPL-MCNC: 40 MG/DL (ref 23–92)
HGB BLD-MCNC: 14.6 G/DL (ref 11.7–15.7)
INR PPP: 1.02 (ref 0.85–1.15)
LDLC SERPL CALC-MCNC: 142 MG/DL
MCH RBC QN AUTO: 31.6 PG (ref 26.5–33)
MCHC RBC AUTO-ENTMCNC: 33.6 G/DL (ref 31.5–36.5)
MCV RBC AUTO: 94 FL (ref 78–100)
NONHDLC SERPL-MCNC: 155 MG/DL
PLATELET # BLD AUTO: 237 10E3/UL (ref 150–450)
RBC # BLD AUTO: 4.62 10E6/UL (ref 3.8–5.2)
TRIGL SERPL-MCNC: 65 MG/DL
TSH SERPL DL<=0.005 MIU/L-ACNC: 5.97 MU/L (ref 0.4–4)
WBC # BLD AUTO: 4.4 10E3/UL (ref 4–11)

## 2022-01-07 PROCEDURE — G0463 HOSPITAL OUTPT CLINIC VISIT: HCPCS

## 2022-01-07 PROCEDURE — 36415 COLL VENOUS BLD VENIPUNCTURE: CPT | Mod: ZL | Performed by: FAMILY MEDICINE

## 2022-01-07 PROCEDURE — 84443 ASSAY THYROID STIM HORMONE: CPT | Mod: ZL | Performed by: FAMILY MEDICINE

## 2022-01-07 PROCEDURE — 85610 PROTHROMBIN TIME: CPT | Mod: ZL | Performed by: FAMILY MEDICINE

## 2022-01-07 PROCEDURE — G0439 PPPS, SUBSEQ VISIT: HCPCS | Performed by: FAMILY MEDICINE

## 2022-01-07 PROCEDURE — 82947 ASSAY GLUCOSE BLOOD QUANT: CPT | Mod: ZL | Performed by: FAMILY MEDICINE

## 2022-01-07 PROCEDURE — 85027 COMPLETE CBC AUTOMATED: CPT | Mod: ZL | Performed by: FAMILY MEDICINE

## 2022-01-07 PROCEDURE — 80061 LIPID PANEL: CPT | Mod: ZL | Performed by: FAMILY MEDICINE

## 2022-01-07 RX ORDER — ACYCLOVIR 400 MG/1
400 TABLET ORAL 2 TIMES DAILY
Qty: 180 TABLET | Refills: 4 | Status: SHIPPED | OUTPATIENT
Start: 2022-01-07 | End: 2023-01-09

## 2022-01-07 ASSESSMENT — ACTIVITIES OF DAILY LIVING (ADL): CURRENT_FUNCTION: NO ASSISTANCE NEEDED

## 2022-01-07 ASSESSMENT — MIFFLIN-ST. JEOR: SCORE: 1169.79

## 2022-01-07 ASSESSMENT — PAIN SCALES - GENERAL: PAINLEVEL: MILD PAIN (2)

## 2022-01-07 NOTE — TELEPHONE ENCOUNTER
I faxed in a different one, if that is not covered, then Warfarin.  Let her know.  Fady Oconnor MD on 1/7/2022 at 2:17 PM

## 2022-01-07 NOTE — NURSING NOTE
"Coming in for a physical     Is fasating    Chief Complaint   Patient presents with     Physical     wellness       Initial /72   Pulse 74   Temp 97.2  F (36.2  C)   Resp 14   Ht 1.638 m (5' 4.5\")   Wt 63.7 kg (140 lb 6.4 oz)   SpO2 99%   BMI 23.73 kg/m   Estimated body mass index is 23.73 kg/m  as calculated from the following:    Height as of this encounter: 1.638 m (5' 4.5\").    Weight as of this encounter: 63.7 kg (140 lb 6.4 oz).  Medication Reconciliation: complete.  FOOD SECURITY SCREENING QUESTIONS  Hunger Vital Signs:  Within the past 12 months we worried whether our food would run out before we got money to buy more. Never  Within the past 12 months the food we bought just didn't last and we didn't have money to get more. Never  Shannan Morrison LPN 1/7/2022 8:50 AM      Shannan Morrison LPN     Visual Acuity Screening - Snellen Chart   Visualacuity OD (right eye): 20/ 25   Visual acuity OS (left eye): 20/ 32   Visual acuity OU (both eyes): 20/ 25   Corrective lenses worn: no             "

## 2022-01-07 NOTE — TELEPHONE ENCOUNTER
Pt was informed from the Pharmacy that the medication prescribed today for Apixaban,her insurance does not cover, is there something else that can be prescribed, please call if questions, thank you!     Silvia Da Silva on 1/7/2022 at 1:01 PM

## 2022-01-07 NOTE — PATIENT INSTRUCTIONS
Patient Education   Personalized Prevention Plan  You are due for the preventive services outlined below.  Your care team is available to assist you in scheduling these services.  If you have already completed any of these items, please share that information with your care team to update in your medical record.  Health Maintenance Due   Topic Date Due     Cholesterol Lab  09/02/2021       Exercise for a Healthier Heart  You may wonder how you can improve the health of your heart. If you re thinking about exercise, you re on the right track. You don t need to become an athlete. But you do need a certain amount of brisk exercise to help strengthen your heart. If you have been diagnosed with a heart condition, your healthcare provider may advise exercise to help stabilize your condition. To help make exercise a habit, choose safe, fun activities.      Exercise with a friend. When activity is fun, you're more likely to stick with it.   Before you start  Check with your healthcare provider before starting an exercise program. This is especially important if you have not been active for a while. It's also important if you have a long-term (chronic) health problem such as heart disease, diabetes, or obesity. Or if you are at high risk for having these problems.   Why exercise?  Exercising regularly offers many healthy rewards. It can help you do all of the following:     Improve your blood cholesterol level to help prevent further heart trouble    Lower your blood pressure to help prevent a stroke or heart attack    Control diabetes, or reduce your risk of getting this disease    Improve your heart and lung function    Reach and stay at a healthy weight    Make your muscles stronger so you can stay active    Prevent falls and fractures by slowing the loss of bone mass (osteoporosis)    Manage stress better    Reduce your blood pressure    Improve your sense of self and your body image  Exercise tips      Ease into your  routine. Set small goals. Then build on them. If you are not sure what your activity level should be, talk with your healthcare provider first before starting an exercise routine.    Exercise on most days. Aim for a total of 150 minutes (2 hours and 30 minutes) or more of moderate-intensity aerobic activity each week. Or 75 minutes (1 hour and 15 minutes) or more of vigorous-intensity aerobic activity each week. Or try for a combination of both. Moderate activity means that you breathe heavier and your heart rate increases but you can still talk. Think about doing 40 minutes of moderate exercise, 3 to 4 times a week. For best results, activity should last for about 40 minutes to lower blood pressure and cholesterol. It's OK to work up to the 40-minute period over time. Examples of moderate-intensity activity are walking 1 mile in 15 minutes. Or doing 30 to 45 minutes of yard work.    Step up your daily activity level.  Along with your exercise program, try being more active the whole day. Walk instead of drive. Or park further away so that you take more steps each day. Do more household tasks or yard work. You may not be able to meet the advised mount of physical activity. But doing some moderate- or vigorous-intensity aerobic activity can help reduce your risk for heart disease. Your healthcare provider can help you figure out what is best for you.    Choose 1 or more activities you enjoy.  Walking is one of the easiest things you can do. You can also try swimming, riding a bike, dancing, or taking an exercise class.    When to call your healthcare provider  Call your healthcare provider if you have any of these:     Chest pain or feel dizzy or lightheaded    Burning, tightness, pressure, or heaviness in your chest, neck, shoulders, back, or arms    Abnormal shortness of breath    More joint or muscle pain    A very fast or irregular heartbeat (palpitations)  Devika last reviewed this educational content on  7/1/2019 2000-2021 [employer]. All rights reserved. This information is not intended as a substitute for professional medical care. Always follow your healthcare professional's instructions.          Understanding USDA MyPlate  The USDA has guidelines to help you make healthy food choices. These are called MyPlate. MyPlate shows the food groups that make up healthy meals using the image of a place setting. Before you eat, think about the healthiest choices for what to put on your plate or in your cup or bowl. To learn more about building a healthy plate, visit www.choosemyplate.gov.    The food groups    Fruits. Any fruit or 100% fruit juice counts as part of the Fruit Group. Fruits may be fresh, canned, frozen, or dried, and may be whole, cut-up, or pureed. Make 1/2 of your plate fruits and vegetables.    Vegetables. Any vegetable or 100% vegetable juice counts as a member of the Vegetable Group. Vegetables may be fresh, frozen, canned, or dried. They can be served raw or cooked and may be whole, cut-up, or mashed. Make 1/2 of your plate fruits and vegetables.    Grains. All foods made from grains are part of the Grains Group. These include wheat, rice, oats, cornmeal, and barley. Grains are often used to make foods such as bread, pasta, oatmeal, cereal, tortillas, and grits. Grains should be no more than 1/4 of your plate. At least half of your grains should be whole grains.    Protein. This group includes meat, poultry, seafood, beans and peas, eggs, processed soy products (such as tofu), nuts (including nut butters), and seeds. Make protein choices no more than 1/4 of your plate. Meat and poultry choices should be lean or low fat.    Dairy. The Dairy Group includes all fluid milk products and foods made from milk that contain calcium, such as yogurt and cheese. (Foods that have little calcium, such as cream, butter, and cream cheese, are not part of this group.) Most dairy choices should be  low-fat or fat-free.    Oils. Oils aren't a food group, but they do contain essential nutrients. However it's important to watch your intake of oils. These are fats that are liquid at room temperature. They include canola, corn, olive, soybean, vegetable, and sunflower oil. Foods that are mainly oil include mayonnaise, certain salad dressings, and soft margarines. You likely already get your daily oil allowance from the foods you eat.  Things to limit  Eating healthy also means limiting these things in your diet:       Salt (sodium). Many processed foods have a lot of sodium. To keep sodium intake down, eat fresh vegetables, meats, poultry, and seafood when possible. Purchase low-sodium, reduced-sodium, or no-salt-added food products at the store. And don't add salt to your meals at home. Instead, season them with herbs and spices such as dill, oregano, cumin, and paprika. Or try adding flavor with lemon or lime zest and juice.    Saturated fat. Saturated fats are most often found in animal products such as beef, pork, and chicken. They are often solid at room temperature, such as butter. To reduce your saturated fat intake, choose leaner cuts of meat and poultry. And try healthier cooking methods such as grilling, broiling, roasting, or baking. For a simple lower-fat swap, use plain nonfat yogurt instead of mayonnaise when making potato salad or macaroni salad.    Added sugars. These are sugars added to foods. They are in foods such as ice cream, candy, soda, fruit drinks, sports drinks, energy drinks, cookies, pastries, jams, and syrups. Cut down on added sugars by sharing sweet treats with a family member or friend. You can also choose fruit for dessert, and drink water or other unsweetened beverages.     [employer] last reviewed this educational content on 6/1/2020 2000-2021 [employer]. All rights reserved. This information is not intended as a substitute for professional medical care. Always  follow your healthcare professional's instructions.

## 2022-01-11 ENCOUNTER — TELEPHONE (OUTPATIENT)
Dept: FAMILY MEDICINE | Facility: OTHER | Age: 67
End: 2022-01-11
Payer: COMMERCIAL

## 2022-01-11 DIAGNOSIS — I48.0 PAROXYSMAL ATRIAL FIBRILLATION (H): Primary | ICD-10-CM

## 2022-01-11 NOTE — TELEPHONE ENCOUNTER
[facility] has a question on the Xarelto dosage by Dr. Oconnor. TJP is not available today.    Elizabeth Arvizu on 1/11/2022 at 11:08 AM

## 2022-01-12 NOTE — TELEPHONE ENCOUNTER
Called and spoke with pharmacist, he is wanting to confirm twice a day dosing or daily dosing. Camryn Rodgers LPN .......................1/12/2022  9:38 AM

## 2022-02-21 DIAGNOSIS — B02.30 OPHTHALMIC HERPES ZOSTER: ICD-10-CM

## 2022-02-22 RX ORDER — ACYCLOVIR 400 MG/1
TABLET ORAL
Qty: 180 TABLET | Refills: 0 | OUTPATIENT
Start: 2022-02-22

## 2022-02-22 NOTE — TELEPHONE ENCOUNTER
[facility] sent Rx request for the following:      Acyclovir 400 MG Oral Tablet  Sig: Take 1 tablet by mouth twice daily      Last Prescription Date:   1/7/2022  Last Fill Qty/Refills:         180, R-4        Redundant refill request refused: Too soon:      Unable to complete prescription refill per RN Medication Refill Policy.................... Toñito Sheriff RN ....................  2/22/2022   8:48 AM

## 2022-03-09 ENCOUNTER — TELEPHONE (OUTPATIENT)
Dept: FAMILY MEDICINE | Facility: OTHER | Age: 67
End: 2022-03-09
Payer: COMMERCIAL

## 2022-03-09 DIAGNOSIS — E04.9 NODULAR GOITER, NON-TOXIC: Primary | ICD-10-CM

## 2022-03-10 ENCOUNTER — LAB (OUTPATIENT)
Dept: LAB | Facility: OTHER | Age: 67
End: 2022-03-10
Attending: FAMILY MEDICINE
Payer: MEDICARE

## 2022-03-10 DIAGNOSIS — E04.9 NODULAR GOITER, NON-TOXIC: ICD-10-CM

## 2022-03-10 LAB — TSH SERPL DL<=0.005 MIU/L-ACNC: 4.33 MU/L (ref 0.4–4)

## 2022-03-10 PROCEDURE — 84443 ASSAY THYROID STIM HORMONE: CPT | Mod: ZL

## 2022-03-10 PROCEDURE — 36415 COLL VENOUS BLD VENIPUNCTURE: CPT | Mod: ZL

## 2022-03-11 ENCOUNTER — MYC MEDICAL ADVICE (OUTPATIENT)
Dept: FAMILY MEDICINE | Facility: OTHER | Age: 67
End: 2022-03-11
Payer: COMMERCIAL

## 2022-04-25 ENCOUNTER — OFFICE VISIT (OUTPATIENT)
Dept: FAMILY MEDICINE | Facility: OTHER | Age: 67
End: 2022-04-25
Attending: STUDENT IN AN ORGANIZED HEALTH CARE EDUCATION/TRAINING PROGRAM
Payer: MEDICARE

## 2022-04-25 VITALS
TEMPERATURE: 97.6 F | HEIGHT: 65 IN | SYSTOLIC BLOOD PRESSURE: 134 MMHG | BODY MASS INDEX: 23.16 KG/M2 | HEART RATE: 108 BPM | RESPIRATION RATE: 18 BRPM | OXYGEN SATURATION: 98 % | DIASTOLIC BLOOD PRESSURE: 72 MMHG | WEIGHT: 139 LBS

## 2022-04-25 DIAGNOSIS — M54.2 NECK PAIN: Primary | ICD-10-CM

## 2022-04-25 DIAGNOSIS — E04.9 NODULAR GOITER, NON-TOXIC: ICD-10-CM

## 2022-04-25 LAB
T4 FREE SERPL-MCNC: 0.82 NG/DL (ref 0.6–1.6)
TSH SERPL DL<=0.005 MIU/L-ACNC: 4.2 MU/L (ref 0.4–4)

## 2022-04-25 PROCEDURE — 84439 ASSAY OF FREE THYROXINE: CPT | Mod: ZL | Performed by: STUDENT IN AN ORGANIZED HEALTH CARE EDUCATION/TRAINING PROGRAM

## 2022-04-25 PROCEDURE — 36415 COLL VENOUS BLD VENIPUNCTURE: CPT | Mod: ZL | Performed by: STUDENT IN AN ORGANIZED HEALTH CARE EDUCATION/TRAINING PROGRAM

## 2022-04-25 PROCEDURE — G0463 HOSPITAL OUTPT CLINIC VISIT: HCPCS

## 2022-04-25 PROCEDURE — 99213 OFFICE O/P EST LOW 20 MIN: CPT | Performed by: STUDENT IN AN ORGANIZED HEALTH CARE EDUCATION/TRAINING PROGRAM

## 2022-04-25 PROCEDURE — 84443 ASSAY THYROID STIM HORMONE: CPT | Mod: ZL | Performed by: STUDENT IN AN ORGANIZED HEALTH CARE EDUCATION/TRAINING PROGRAM

## 2022-04-25 ASSESSMENT — PAIN SCALES - GENERAL: PAINLEVEL: MODERATE PAIN (5)

## 2022-04-25 NOTE — NURSING NOTE
Patient presents to clinic experiencing throbbing right ear pain with drainage, sore throat pain and right side neck discomfort for past 4 days.    Medication Rec Complete  Bekah Henriquez LPN............4/25/2022 10:04 AM

## 2022-04-25 NOTE — PROGRESS NOTES
Assessment & Plan     Neck pain  Nodular goiter, non-toxic  Normal ear exam today. No masses or tenderness on neck exam but with her history of goiter will check TSH and order thyroid US today. Nasal mucosa is edematous so could have allergies vs viral URI, she declines flonase today. TSH elevated but improving, T4 normal   - TSH Reflex GH; Future  - US Thyroid; Future  - TSH Reflex GH  - T4 free    René Ruano MD  [facility]    Subjective   Muna is a 66 year old who presents for the following health issues   Patient presents to clinic experiencing throbbing right ear pain with drainage, decreased hearing, sore throat pain and right side neck discomfort for past 4 days.    History of Present Illness       Reason for visit:  Ear Pain  Symptom onset:  3-7 days ago  Symptoms include:  Swollen gland behind ear, soar swallowing, throb at night  Symptom intensity:  Moderate  Symptom progression:  Staying the same  Had these symptoms before:  Yes  Has tried/received treatment for these symptoms:  Yes  What makes it worse:  Laying down  What makes it better:  Hot pack    She eats 2-3 servings of fruits and vegetables daily.She consumes 1 sweetened beverage(s) daily.She exercises with enough effort to increase her heart rate 9 or less minutes per day.  She exercises with enough effort to increase her heart rate 3 or less days per week.   She is taking medications regularly.     Right sided ear and neck fullness/pain  Started about 3 weeks ago, both ears felt full, then seemed to clear up   Now coming back on the right side and worse today  Has tried heat pack without relief  Feels irritation in throat when swallowing, feels like ear drained  Denies other cold symptoms or allergy symptoms   Has chronic rhinorrhea/post nasal drip   Feels like a tight pain in throat/right tonsil           Review of Systems   Constitutional, HEENT, cardiovascular, pulmonary, gi and gu systems are negative,  "except as otherwise noted.      Objective    /72 (BP Location: Right arm, Patient Position: Sitting, Cuff Size: Adult Regular)   Pulse 108   Temp 97.6  F (36.4  C) (Tympanic)   Resp 18   Ht 1.638 m (5' 4.5\")   Wt 63 kg (139 lb)   LMP  (LMP Unknown)   SpO2 98%   Breastfeeding No   BMI 23.49 kg/m    Body mass index is 23.49 kg/m .  Physical Exam   GENERAL: healthy, alert and no distress  EYES: Eyes grossly normal to inspection, PERRL and conjunctivae and sclerae normal  HENT: normal cephalic/atraumatic, ear canals and TM's normal, nasal mucosa edematous , oropharynx clear and oral mucous membranes moist  NECK: no adenopathy, no asymmetry, masses, or scars and thyroid normal to palpation  RESP: lungs clear to auscultation - no rales, rhonchi or wheezes  CV: regular rate and rhythm, normal S1 S2, no S3 or S4, no murmur, click or rub, no peripheral edema and peripheral pulses strong    Results for orders placed or performed in visit on 04/25/22 (from the past 24 hour(s))   TSH Reflex GH   Result Value Ref Range    TSH 4.20 (H) 0.40 - 4.00 mU/L   T4 free   Result Value Ref Range    Free T4 0.82 0.60 - 1.60 ng/dL               "

## 2022-05-20 ENCOUNTER — HOSPITAL ENCOUNTER (OUTPATIENT)
Dept: ULTRASOUND IMAGING | Facility: OTHER | Age: 67
Discharge: HOME | End: 2022-05-20
Attending: STUDENT IN AN ORGANIZED HEALTH CARE EDUCATION/TRAINING PROGRAM | Admitting: STUDENT IN AN ORGANIZED HEALTH CARE EDUCATION/TRAINING PROGRAM
Payer: MEDICARE

## 2022-05-20 DIAGNOSIS — E04.9 NODULAR GOITER, NON-TOXIC: ICD-10-CM

## 2022-05-20 PROCEDURE — 76536 US EXAM OF HEAD AND NECK: CPT

## 2022-06-13 ENCOUNTER — VIRTUAL VISIT (OUTPATIENT)
Dept: FAMILY MEDICINE | Facility: OTHER | Age: 67
End: 2022-06-13
Attending: PHYSICIAN ASSISTANT
Payer: COMMERCIAL

## 2022-06-13 DIAGNOSIS — U07.1 INFECTION DUE TO 2019 NOVEL CORONAVIRUS: Primary | ICD-10-CM

## 2022-06-13 PROCEDURE — 99212 OFFICE O/P EST SF 10 MIN: CPT | Mod: 95 | Performed by: PHYSICIAN ASSISTANT

## 2022-06-13 NOTE — NURSING NOTE
Patient tested positive for COVID on Sunday at home test. Would like to discuss antiviral.  Marisa Eric LPN ....................  6/13/2022   8:42 AM

## 2022-06-13 NOTE — PROGRESS NOTES
Muna is a 66 year old who is being evaluated via a billable telephone visit.      What phone number would you like to be contacted at? [phone]  How would you like to obtain your AVS? [facility]    Assessment & Plan     1. Infection due to 2019 novel coronavirus  Patient with recent positive at home COVID test with symptoms developing last Thursday.  He is in high risk category due to age of 66 and history of atrial fibrillation.  Overall patient is feeling much improved.  Discussed benefits versus risk of antiviral medication given her health history.  Patient in agreement with no prescription for Paxlovid at this time as she is continuing to improve.  If symptoms worsen follow-up for additional evaluation.        Return if symptoms worsen or fail to improve.    Naomi Briceño PA-C  [facility]    Subjective   Muna is a 66 year old who presents for the following health issues    HPI   Contacted via telephone for discussion regarding recent positive at-home COVID test.  Patient reports she initially developed a sore throat, head congestion on Thursday.  At home COVID test was negative at that time.  Friday she had a low-grade temp.  Over the weekend she did begin to feel better.  She is feeling improved today but still not at her baseline.  She does have a history of atrial fibrillation for which she is chronically on Xarelto.  She is somewhat wary of taking an antiviral at this time but wanted to discuss it further with her provider.  No fever/chills, shortness of breath, wheezing, body aches, GI symptoms, rash.      PAST MEDICAL HISTORY:   Past Medical History:   Diagnosis Date     Asymptomatic premature menopause     mid 30s     Cardiac arrhythmia     No Comments Provided     Diaphragmatic hernia without obstruction or gangrene     No Comments Provided     Hypoglycemia     episodes of hypoglycemia if not eating     Paroxysmal atrial fibrillation (H) 5/18/2018     Pupillary abnormality  of both eyes     1993,Adie's tonic pupil (bilateral)       PAST SURGICAL HISTORY:   Past Surgical History:   Procedure Laterality Date     COLONOSCOPY  08/11/2017    Done by Dr. Barbara Goss     ENDOSCOPY UPPER, COLONOSCOPY, COMBINED      8/11/2017     ESOPHAGOSCOPY, GASTROSCOPY, DUODENOSCOPY (EGD), COMBINED      2009     HYSTERECTOMY TOTAL ABDOMINAL, BILATERAL SALPINGO-OOPHORECTOMY, COMBINED      2003     LAPAROSCOPIC CHOLECYSTECTOMY      2007     LAPAROSCOPIC TUBAL LIGATION      1980s       FAMILY HISTORY:   Family History   Problem Relation Age of Onset     Hypertension Mother         Hypertension     Heart Disease Mother 86        Heart Disease,silent MI, CHF     Coronary Artery Disease Mother 86        MI  Afib     Arrhythmia Mother      Diabetes Father 60        Diabetes     Other - See Comments Father 55        Atrial fibrillation     Arrhythmia Father      Hypertension Brother         Hypertension     Breast Cancer No family hx of         Cancer-breast       SOCIAL HISTORY:   Social History     Tobacco Use     Smoking status: Never Smoker     Smokeless tobacco: Never Used   Substance Use Topics     Alcohol use: No     Alcohol/week: 0.0 standard drinks        Allergies   Allergen Reactions     Clindamycin Difficulty breathing     Erythromycin Nausea and Vomiting     Levofloxacin Other (See Comments) and Palpitations     Penicillins Rash     Sulfa Drugs Rash     Current Outpatient Medications   Medication     acyclovir (ZOVIRAX) 400 MG tablet     estrogens-methylTESTOSTERone (ESTRATEST) 1.25-2.5 MG per tablet     Lysine HCl 1000 MG TABS     metroNIDAZOLE (METROGEL) 0.75 % external gel     rivaroxaban ANTICOAGULANT (XARELTO ANTICOAGULANT) 10 MG TABS tablet     rivaroxaban ANTICOAGULANT (XARELTO ANTICOAGULANT) 20 MG TABS tablet     sucralfate (CARAFATE) 1 GM tablet     trifluridine (VIROPTIC) 1 % ophthalmic solution     No current facility-administered medications for this visit.         Review of Systems          Objective           Vitals:  No vitals were obtained today due to virtual visit.    Physical Exam   healthy, alert and no distress  PSYCH: Alert and oriented times 3; coherent speech, normal   rate and volume, able to articulate logical thoughts, able   to abstract reason, no tangential thoughts, no hallucinations   or delusions  Her affect is normal  RESP: No cough, no audible wheezing, able to talk in full sentences  Remainder of exam unable to be completed due to telephone visits            Phone call duration: 11 minutes

## 2022-06-28 DIAGNOSIS — E28.319 PREMATURE MENOPAUSE ON HRT: ICD-10-CM

## 2022-06-28 DIAGNOSIS — Z79.890 PREMATURE MENOPAUSE ON HRT: ICD-10-CM

## 2022-06-28 NOTE — TELEPHONE ENCOUNTER
[facility] sent Rx request for the following:   estrogens-methylTESTOSTERone (ESTRATEST) 1.25-2.5 MG per tablet  Sig TAKE 1 TABLET BY MOUTH EVERY DAY    Last Prescription Date:   06/29/2021  Last Fill Qty/Refills:         90, R-3    Last Office Visit:              04/25/2022 (Zoe Ruano)   Future Office visit:           None noted    Routing refill request to provider for review/approval because:  Drug not on the [facility], [facility] or [facility] refill protocol or controlled substance    Unable to complete prescription refill per RN Medication Refill Policy.................... Sima Byrnes RN ....................  6/28/2022   3:57 PM

## 2022-06-29 RX ORDER — ESTERIFIED ESTROGEN AND METHYLTESTOSTERONE 1.25; 2.5 MG/1; MG/1
TABLET ORAL
Qty: 90 TABLET | Refills: 3 | Status: SHIPPED | OUTPATIENT
Start: 2022-06-29 | End: 2023-08-01

## 2022-09-17 ENCOUNTER — HEALTH MAINTENANCE LETTER (OUTPATIENT)
Age: 67
End: 2022-09-17

## 2023-01-02 ASSESSMENT — ENCOUNTER SYMPTOMS
FREQUENCY: 0
PALPITATIONS: 0
SHORTNESS OF BREATH: 0
DIZZINESS: 0
NERVOUS/ANXIOUS: 0
ABDOMINAL PAIN: 0
SORE THROAT: 0
CONSTIPATION: 0
MYALGIAS: 0
HEADACHES: 1
NAUSEA: 0
JOINT SWELLING: 0
DYSURIA: 0
CHILLS: 0
EYE PAIN: 0
WEAKNESS: 0
ARTHRALGIAS: 0
HEARTBURN: 0
HEMATURIA: 0
HEMATOCHEZIA: 0
COUGH: 0
PARESTHESIAS: 0
BREAST MASS: 0
DIARRHEA: 0
FEVER: 0

## 2023-01-02 ASSESSMENT — ACTIVITIES OF DAILY LIVING (ADL): CURRENT_FUNCTION: NO ASSISTANCE NEEDED

## 2023-01-09 ENCOUNTER — OFFICE VISIT (OUTPATIENT)
Dept: FAMILY MEDICINE | Facility: OTHER | Age: 68
End: 2023-01-09
Attending: FAMILY MEDICINE
Payer: COMMERCIAL

## 2023-01-09 VITALS
BODY MASS INDEX: 24.38 KG/M2 | WEIGHT: 137.6 LBS | RESPIRATION RATE: 16 BRPM | HEART RATE: 72 BPM | OXYGEN SATURATION: 99 % | TEMPERATURE: 97.6 F | HEIGHT: 63 IN | DIASTOLIC BLOOD PRESSURE: 66 MMHG | SYSTOLIC BLOOD PRESSURE: 136 MMHG

## 2023-01-09 DIAGNOSIS — I48.0 PAROXYSMAL ATRIAL FIBRILLATION (H): ICD-10-CM

## 2023-01-09 DIAGNOSIS — Z00.00 ENCOUNTER FOR MEDICARE ANNUAL WELLNESS EXAM: Primary | ICD-10-CM

## 2023-01-09 DIAGNOSIS — B02.30 OPHTHALMIC HERPES ZOSTER: ICD-10-CM

## 2023-01-09 LAB
ERYTHROCYTE [DISTWIDTH] IN BLOOD BY AUTOMATED COUNT: 12.9 % (ref 10–15)
HCT VFR BLD AUTO: 42.8 % (ref 35–47)
HGB BLD-MCNC: 14.7 G/DL (ref 11.7–15.7)
MCH RBC QN AUTO: 32.2 PG (ref 26.5–33)
MCHC RBC AUTO-ENTMCNC: 34.3 G/DL (ref 31.5–36.5)
MCV RBC AUTO: 94 FL (ref 78–100)
PLATELET # BLD AUTO: 230 10E3/UL (ref 150–450)
RBC # BLD AUTO: 4.57 10E6/UL (ref 3.8–5.2)
TSH SERPL DL<=0.005 MIU/L-ACNC: 6.89 UIU/ML (ref 0.3–4.2)
WBC # BLD AUTO: 5.1 10E3/UL (ref 4–11)

## 2023-01-09 PROCEDURE — G0439 PPPS, SUBSEQ VISIT: HCPCS | Performed by: FAMILY MEDICINE

## 2023-01-09 PROCEDURE — 99213 OFFICE O/P EST LOW 20 MIN: CPT | Mod: 25 | Performed by: FAMILY MEDICINE

## 2023-01-09 PROCEDURE — 85027 COMPLETE CBC AUTOMATED: CPT | Mod: ZL | Performed by: FAMILY MEDICINE

## 2023-01-09 PROCEDURE — 85041 AUTOMATED RBC COUNT: CPT | Mod: ZL | Performed by: FAMILY MEDICINE

## 2023-01-09 PROCEDURE — 36415 COLL VENOUS BLD VENIPUNCTURE: CPT | Mod: ZL | Performed by: FAMILY MEDICINE

## 2023-01-09 PROCEDURE — 84443 ASSAY THYROID STIM HORMONE: CPT | Mod: ZL | Performed by: FAMILY MEDICINE

## 2023-01-09 PROCEDURE — G0463 HOSPITAL OUTPT CLINIC VISIT: HCPCS

## 2023-01-09 RX ORDER — ACYCLOVIR 400 MG/1
400 TABLET ORAL 2 TIMES DAILY
Qty: 180 TABLET | Refills: 4 | Status: SHIPPED | OUTPATIENT
Start: 2023-01-09 | End: 2023-02-21

## 2023-01-09 ASSESSMENT — ENCOUNTER SYMPTOMS
FEVER: 0
BREAST MASS: 0
DIARRHEA: 0
JOINT SWELLING: 0
PALPITATIONS: 0
NAUSEA: 0
SHORTNESS OF BREATH: 0
COUGH: 0
WEAKNESS: 0
CONSTIPATION: 0
EYE PAIN: 0
MYALGIAS: 0
HEMATOCHEZIA: 0
NERVOUS/ANXIOUS: 0
ARTHRALGIAS: 0
PARESTHESIAS: 0
HEADACHES: 1
CHILLS: 0
HEMATURIA: 0
SORE THROAT: 0
DIZZINESS: 0
FREQUENCY: 0
HEARTBURN: 0
ABDOMINAL PAIN: 0
DYSURIA: 0

## 2023-01-09 ASSESSMENT — ACTIVITIES OF DAILY LIVING (ADL): CURRENT_FUNCTION: NO ASSISTANCE NEEDED

## 2023-01-09 ASSESSMENT — PAIN SCALES - GENERAL: PAINLEVEL: NO PAIN (0)

## 2023-01-09 NOTE — PATIENT INSTRUCTIONS
Patient Education   Personalized Prevention Plan  You are due for the preventive services outlined below.  Your care team is available to assist you in scheduling these services.  If you have already completed any of these items, please share that information with your care team to update in your medical record.  There are no preventive care reminders to display for this patient.    Understanding USDA MyPlate  The USDA has guidelines to help you make healthy food choices. These are called MyPlate. MyPlate shows the food groups that make up healthy meals using the image of a place setting. Before you eat, think about the healthiest choices for what to put on your plate or in your cup or bowl. To learn more about building a healthy plate, visit www.choosemyplate.gov.    The food groups    Fruits. Any fruit or 100% fruit juice counts as part of the Fruit Group. Fruits may be fresh, canned, frozen, or dried, and may be whole, cut-up, or pureed. Make 1/2 of your plate fruits and vegetables.    Vegetables. Any vegetable or 100% vegetable juice counts as a member of the Vegetable Group. Vegetables may be fresh, frozen, canned, or dried. They can be served raw or cooked and may be whole, cut-up, or mashed. Make 1/2 of your plate fruits and vegetables.    Grains. All foods made from grains are part of the Grains Group. These include wheat, rice, oats, cornmeal, and barley. Grains are often used to make foods such as bread, pasta, oatmeal, cereal, tortillas, and grits. Grains should be no more than 1/4 of your plate. At least half of your grains should be whole grains.    Protein. This group includes meat, poultry, seafood, beans and peas, eggs, processed soy products (such as tofu), nuts (including nut butters), and seeds. Make protein choices no more than 1/4 of your plate. Meat and poultry choices should be lean or low fat.    Dairy. The Dairy Group includes all fluid milk products and foods made from milk that contain  calcium, such as yogurt and cheese. (Foods that have little calcium, such as cream, butter, and cream cheese, are not part of this group.) Most dairy choices should be low-fat or fat-free.    Oils. Oils aren't a food group, but they do contain essential nutrients. However it's important to watch your intake of oils. These are fats that are liquid at room temperature. They include canola, corn, olive, soybean, vegetable, and sunflower oil. Foods that are mainly oil include mayonnaise, certain salad dressings, and soft margarines. You likely already get your daily oil allowance from the foods you eat.  Things to limit  Eating healthy also means limiting these things in your diet:       Salt (sodium). Many processed foods have a lot of sodium. To keep sodium intake down, eat fresh vegetables, meats, poultry, and seafood when possible. Purchase low-sodium, reduced-sodium, or no-salt-added food products at the store. And don't add salt to your meals at home. Instead, season them with herbs and spices such as dill, oregano, cumin, and paprika. Or try adding flavor with lemon or lime zest and juice.    Saturated fat. Saturated fats are most often found in animal products such as beef, pork, and chicken. They are often solid at room temperature, such as butter. To reduce your saturated fat intake, choose leaner cuts of meat and poultry. And try healthier cooking methods such as grilling, broiling, roasting, or baking. For a simple lower-fat swap, use plain nonfat yogurt instead of mayonnaise when making potato salad or macaroni salad.    Added sugars. These are sugars added to foods. They are in foods such as ice cream, candy, soda, fruit drinks, sports drinks, energy drinks, cookies, pastries, jams, and syrups. Cut down on added sugars by sharing sweet treats with a family member or friend. You can also choose fruit for dessert, and drink water or other unsweetened beverages.     StayWell last reviewed this educational  content on 6/1/2020 2000-2021 [employer]. All rights reserved. This information is not intended as a substitute for professional medical care. Always follow your healthcare professional's instructions.          Signs of Hearing Loss      Hearing much better with one ear can be a sign of hearing loss.   Hearing loss is a problem shared by many people. In fact, it is one of the most common health problems, particularly as people age. Most people age 65 and older have some hearing loss. By age 80, almost everyone does. Hearing loss often occurs slowly over the years. So you may not realize your hearing has gotten worse.  Have your hearing checked  Call your healthcare provider if you:    Have to strain to hear normal conversation    Have to watch other people s faces very carefully to follow what they re saying    Need to ask people to repeat what they ve said    Often misunderstand what people are saying    Turn the volume of the television or radio up so high that others complain    Feel that people are mumbling when they re talking to you    Find that the effort to hear leaves you feeling tired and irritated    Notice, when using the phone, that you hear better with one ear than the other  StayWell last reviewed this educational content on 1/1/2020 2000-2021 [employer]. All rights reserved. This information is not intended as a substitute for professional medical care. Always follow your healthcare professional's instructions.          Preventing Falls at Home  A person can fall for many reasons. Older adults may fall because reaction time slows down as we age. Your muscles and joints may get stiff, weak, or less flexible because of illness, medicines, or a physical condition.   Other health problems that make falls more likely include:     Arthritis    Dizziness or lightheadedness when you stand up (orthostatic hypotension)    History of a stroke    Dizziness    Anemia    Certain  medicines taken for mental illness or to control blood pressure.    Problems with balance or gait    Bladder or urinary problems    History of falling    Changes in vision (vision impairment)    Changes in thinking skills and memory (cognitive impairment)  Injuries from a fall can include serious injuries such as broken bones, dislocated joints, internal bleeding and cuts. Injuries like these can limit your independence.   Prevention tips  To help prevent falls and fall-related injuries, follow the tips below.    Floors  To make floors safer:     Put nonskid pads under area rugs.    Remove small rugs.    Replace worn floor coverings.    Tack carpets firmly to each step on carpeted stairs. Put nonskid strips on the edges of uncarpeted stairs.    Keep floors and stairs free of clutter and cords.    Arrange furniture so there are clear pathways.    Clean up any spills right away.  Bathrooms    To make bathrooms safer:     Install grab bars in the tub or shower.    Apply nonskid strips or put a nonskid rubber mat in the tub or shower.    Sit on a bath chair to bathe.    Use bathmats with nonskid backing.  Lighting  To improve visibility in your home:      Keep a flashlight in each room. Or put a lamp next to the bed within easy reach.    Put nightlights in the bedrooms, hallways, kitchen, and bathrooms.    Make sure all stairways have good lighting.    Take your time when going up and down stairs.    Put handrails on both sides of stairs and in walkways for more support. To prevent injury to your wrist or arm, don t use handrails to pull yourself up.    Install grab bars to pull yourself up.    Move or rearrange items that you use often. This will make them easier to find or reach.    Look at your home to find any safety hazards. Especially look at doorways, walkways, and the driveway. Remove or repair any safety problems that you find.  Other changes to make    Look around to find any safety hazards. Look closely at  doorways, walkways, and the driveway. Remove or repair any safety problems that you find.    Wear shoes that fit well.    Take your time when going up and down stairs.    Put handrails on both sides of stairs and in walkways for more support. To prevent injury to your wrist or arm, don t use handrails to pull yourself up.    Install grab bars wherever needed to pull yourself up.    Arrange items that you use often. This will make them easier to find or reach.    Devika last reviewed this educational content on 3/1/2020    8845-0343 [employer]. All rights reserved. This information is not intended as a substitute for professional medical care. Always follow your healthcare professional's instructions.

## 2023-01-09 NOTE — PROGRESS NOTES
"SUBJECTIVE:   Muna is a 67 year old who presents for Preventive Visit.    Patient has been advised of split billing requirements and indicates understanding: Yes  Are you in the first 12 months of your Medicare coverage?  No    Healthy Habits:     In general, how would you rate your overall health?  Good    Frequency of exercise:  2-3 days/week    Duration of exercise:  15-30 minutes    Do you usually eat at least 4 servings of fruit and vegetables a day, include whole grains    & fiber and avoid regularly eating high fat or \"junk\" foods?  No    Taking medications regularly:  Yes    Medication side effects:  Not applicable    Ability to successfully perform activities of daily living:  No assistance needed    Home Safety:  No safety concerns identified    Hearing Impairment:  Feel that people are mumbling or not speaking clearly and need to ask people to speak up or repeat themselves    In the past 6 months, have you been bothered by leaking of urine?  No    In general, how would you rate your overall mental or emotional health?  Good      PHQ-2 Total Score: 2    Additional concerns today:  No      Have you ever done Advance Care Planning? (For example, a Health Directive, POLST, or a discussion with a medical provider or your loved ones about your wishes): Yes, patient states has an Advance Care Planning document and will bring a copy to the clinic.       Fall risk  Fallen 2 or more times in the past year?: Yes  Any fall with injury in the past year?: No    Cognitive Screening   1) Repeat 3 items (Leader, Season, Winter)    2) Clock draw: NORMAL  3) 3 item recall: Recalls 3 objects  Results: 3 items recalled: COGNITIVE IMPAIRMENT LESS LIKELY    Mini-CogTM Copyright LORI Cortés. Licensed by the author for use in [facility]; reprinted with permission (pilar@.[facility]). All rights reserved.      Do you have sleep apnea, excessive snoring or daytime drowsiness?: no    Reviewed and updated as needed this visit " by clinical staff   Tobacco  Allergies  Meds   Med Hx  Surg Hx  Fam Hx  Soc Hx        Reviewed and updated as needed this visit by Provider                 Social History     Tobacco Use     Smoking status: Never     Smokeless tobacco: Never   Substance Use Topics     Alcohol use: No     Alcohol/week: 0.0 standard drinks     If you drink alcohol do you typically have >3 drinks per day or >7 drinks per week? No    Alcohol Use 1/2/2023   Prescreen: >3 drinks/day or >7 drinks/week? Not Applicable   Prescreen: >3 drinks/day or >7 drinks/week? -               Current providers sharing in care for this patient include:    Patient Care Team:  Fady Oconnor MD as PCP - General (Family Practice)  Fady Oconnor MD as Assigned PCP    The following health maintenance items are reviewed in Epic and correct as of today:  Health Maintenance   Topic Date Due     MEDICARE ANNUAL WELLNESS VISIT  01/07/2023     MAMMO SCREENING  09/21/2023     FALL RISK ASSESSMENT  01/09/2024     LIPID  01/07/2027     ADVANCE CARE PLANNING  01/11/2027     COLORECTAL CANCER SCREENING  08/11/2027     DTAP/TDAP/TD IMMUNIZATION (2 - Td or Tdap) 09/14/2027     DEXA  09/07/2031     PHQ-2 (once per calendar year)  Completed     INFLUENZA VACCINE  Completed     Pneumococcal Vaccine: 65+ Years  Completed     ZOSTER IMMUNIZATION  Completed     COVID-19 Vaccine  Completed     IPV IMMUNIZATION  Aged Out     MENINGITIS IMMUNIZATION  Aged Out     HEPATITIS C SCREENING  Discontinued     Labs reviewed in [facility]  Current Outpatient Medications   Medication Sig Dispense Refill     acyclovir (ZOVIRAX) 400 MG tablet Take 1 tablet (400 mg) by mouth 2 times daily 180 tablet 4     estrogens-methylTESTOSTERone (ESTRATEST) 1.25-2.5 MG per tablet TAKE 1 TABLET BY MOUTH EVERY DAY 90 tablet 3     Lysine HCl 1000 MG TABS Take 1,000 mg by mouth daily       metroNIDAZOLE (METROGEL) 0.75 % external gel Apply topically 2 times daily 45 g 3     rivaroxaban ANTICOAGULANT (XARELTO  ANTICOAGULANT) 20 MG TABS tablet Take 1 tablet (20 mg) by mouth daily (with dinner) 90 tablet 4     sucralfate (CARAFATE) 1 GM tablet Take 1 tablet (1 g) by mouth 4 times daily as needed 120 tablet 3     trifluridine (VIROPTIC) 1 % ophthalmic solution Place 1 drop into both eyes as needed  0     Allergies   Allergen Reactions     Clindamycin Difficulty breathing     Erythromycin Nausea and Vomiting     Levofloxacin Other (See Comments) and Palpitations     Penicillins Rash     Sulfa Drugs Rash       Any new diagnosis of family breast, ovarian, or bowel cancer? No    FHS-7:   Breast CA Risk Assessment (FHS-7) 9/21/2021   Did any of your first-degree relatives have breast or ovarian cancer? No   Did any of your relatives have bilateral breast cancer? No   Did any man in your family have breast cancer? No   Did any woman in your family have breast and ovarian cancer? No   Did any woman in your family have breast cancer before age 50 y? No   Do you have 2 or more relatives with breast and/or ovarian cancer? No   Do you have 2 or more relatives with breast and/or bowel cancer? No         Pertinent mammograms are reviewed under the imaging tab.    Review of Systems   Constitutional: Negative for chills and fever.   HENT: Positive for congestion. Negative for ear pain, hearing loss and sore throat.    Eyes: Negative for pain and visual disturbance.   Respiratory: Negative for cough and shortness of breath.    Cardiovascular: Negative for chest pain, palpitations and peripheral edema.   Gastrointestinal: Negative for abdominal pain, constipation, diarrhea, heartburn, hematochezia and nausea.   Breasts:  Negative for tenderness, breast mass and discharge.   Genitourinary: Negative for dysuria, frequency, genital sores, hematuria, pelvic pain, urgency, vaginal bleeding and vaginal discharge.   Musculoskeletal: Negative for arthralgias, joint swelling and myalgias.   Skin: Negative for rash.   Neurological: Positive for  "headaches. Negative for dizziness, weakness and paresthesias.   Psychiatric/Behavioral: Negative for mood changes. The patient is not nervous/anxious.      Headache perhaps once a week.  Usually in the occiput, but can cause throbbing in face.  Neck adjustments are helpful.  This seems to increase her nasal discharge.  Feels this all has worsened since her COVID infection last summer.     Can feel when she goes into a fib.  At times her hr is low into the 40's with some symptoms.  This is pretty rare.        OBJECTIVE:   /66   Pulse 72   Temp 97.6  F (36.4  C) (Tympanic)   Resp 16   Ht 1.607 m (5' 3.25\")   Wt 62.4 kg (137 lb 9.6 oz)   LMP  (LMP Unknown)   SpO2 99%   BMI 24.18 kg/m   Estimated body mass index is 24.18 kg/m  as calculated from the following:    Height as of this encounter: 1.607 m (5' 3.25\").    Weight as of this encounter: 62.4 kg (137 lb 9.6 oz).  Physical Exam  GENERAL APPEARANCE: healthy, alert and no distress  EYES: Eyes grossly normal to inspection, PERRL and conjunctivae and sclerae normal  HENT: ear canals and TM's normal, nose and mouth without ulcers or lesions, oropharynx clear and oral mucous membranes moist  NECK: no adenopathy, no asymmetry, masses, or scars and thyroid normal to palpation  RESP: lungs clear to auscultation - no rales, rhonchi or wheezes  CV: regular rate and rhythm, normal S1 S2, no S3 or S4, no murmur, click or rub, no peripheral edema and peripheral pulses strong  ABDOMEN: soft, nontender, no hepatosplenomegaly, no masses and bowel sounds normal  MS: no musculoskeletal defects are noted and gait is age appropriate without ataxia  SKIN: no suspicious lesions or rashes  NEURO: Normal strength and tone, sensory exam grossly normal, mentation intact and speech normal  PSYCH: mentation appears normal and affect normal/bright    Diagnostic Test Results:  Labs reviewed in Epic  Results for orders placed or performed in visit on 01/09/23   TSH     Status: " Abnormal   Result Value Ref Range    TSH 6.89 (H) 0.30 - 4.20 uIU/mL   CBC W PLT No Diff     Status: Normal   Result Value Ref Range    WBC Count 5.1 4.0 - 11.0 10e3/uL    RBC Count 4.57 3.80 - 5.20 10e6/uL    Hemoglobin 14.7 11.7 - 15.7 g/dL    Hematocrit 42.8 35.0 - 47.0 %    MCV 94 78 - 100 fL    MCH 32.2 26.5 - 33.0 pg    MCHC 34.3 31.5 - 36.5 g/dL    RDW 12.9 10.0 - 15.0 %    Platelet Count 230 150 - 450 10e3/uL         ASSESSMENT / PLAN:       ICD-10-CM    1. Encounter for Medicare annual wellness exam  Z00.00       2. Paroxysmal atrial fibrillation (H)  I48.0 rivaroxaban ANTICOAGULANT (XARELTO ANTICOAGULANT) 20 MG TABS tablet     CBC W PLT No Diff     TSH      3. Ophthalmic herpes zoster  B02.30 acyclovir (ZOVIRAX) 400 MG tablet        A fib is stale, CHADS score is 2.  Lifelong DOAC.  Refilled the meds.     Home blood pressure to see if the reading today is indicative of hypertension.      Subclinical hypothyroidism, discussed with her options and we elected to observe with follow up labs in 3-4 months.           COUNSELING:  Reviewed preventive health counseling, as reflected in patient instructions       Regular exercise       Healthy diet/nutrition       Fall risk prevention       Osteoporosis prevention/bone health        She reports that she has never smoked. She has never used smokeless tobacco.      Appropriate preventive services were discussed with this patient, including applicable screening as appropriate for cardiovascular disease, diabetes, osteopenia/osteoporosis, and glaucoma.  As appropriate for age/gender, discussed screening for colorectal cancer, prostate cancer, breast cancer, and cervical cancer. Checklist reviewing preventive services available has been given to the patient.    Reviewed patients plan of care and provided an AVS. The Basic Care Plan (routine screening as documented in Health Maintenance) for Miguelina meets the Care Plan requirement. This Care Plan has been established  and reviewed with the Patient.          Fady Oconnor MD  [facility]    Identified Health Risks:

## 2023-01-09 NOTE — NURSING NOTE
"Chief Complaint   Patient presents with     Medicare Visit       Initial /66   Pulse 72   Temp 97.6  F (36.4  C) (Tympanic)   Resp 16   Ht 1.607 m (5' 3.25\")   Wt 62.4 kg (137 lb 9.6 oz)   LMP  (LMP Unknown)   SpO2 99%   BMI 24.18 kg/m   Estimated body mass index is 24.18 kg/m  as calculated from the following:    Height as of this encounter: 1.607 m (5' 3.25\").    Weight as of this encounter: 62.4 kg (137 lb 9.6 oz).  Medication Reconciliation: complete    FOOD SECURITY SCREENING QUESTIONS  Hunger Vital Signs:  Within the past 12 months we worried whether our food would run out before we got money to buy more. Never  Within the past 12 months the food we bought just didn't last and we didn't have money to get more. Never  Asha Granado LPN 1/9/2023 8:01 AM    Do you have a healthcare directive? No      "

## 2023-01-09 NOTE — PROGRESS NOTES
The patient was counseled and encouraged to consider modifying their diet and eating habits. She was provided with information on recommended healthy diet options.  The patient was provided with written information regarding signs of hearing loss.  She is at risk for falling and has been provided with information to reduce the risk of falling at home.

## 2023-02-20 DIAGNOSIS — B02.30 OPHTHALMIC HERPES ZOSTER: ICD-10-CM

## 2023-02-21 RX ORDER — ACYCLOVIR 400 MG/1
TABLET ORAL
Qty: 180 TABLET | Refills: 0 | Status: SHIPPED | OUTPATIENT
Start: 2023-02-21 | End: 2024-01-16

## 2023-04-12 ENCOUNTER — TELEPHONE (OUTPATIENT)
Dept: FAMILY MEDICINE | Facility: OTHER | Age: 68
End: 2023-04-12
Payer: COMMERCIAL

## 2023-04-12 DIAGNOSIS — R79.89 ELEVATED TSH: ICD-10-CM

## 2023-04-12 DIAGNOSIS — E06.9 THYROIDITIS, UNSPECIFIED: Primary | ICD-10-CM

## 2023-04-12 NOTE — TELEPHONE ENCOUNTER
Reason for call: Request a lab order.    Order requested for what kind of lab?thyroid     Who is your PCP? TJP    Preferred method for responding to this message: Telephone Call    Phone number patient can be reached at: Cell number on file:    Telephone Information:   Mobile [phone]       If we cannot reach you directly, may we leave a detailed response at the number you provided? Yes      Elizabeth Arvizu on 4/12/2023 at 8:21 AM

## 2023-04-19 ENCOUNTER — LAB (OUTPATIENT)
Dept: LAB | Facility: OTHER | Age: 68
End: 2023-04-19
Attending: FAMILY MEDICINE
Payer: MEDICARE

## 2023-04-19 DIAGNOSIS — E06.9 THYROIDITIS, UNSPECIFIED: ICD-10-CM

## 2023-04-19 LAB
T4 FREE SERPL-MCNC: 0.91 NG/DL (ref 0.9–1.7)
TSH SERPL DL<=0.005 MIU/L-ACNC: 4.41 UIU/ML (ref 0.3–4.2)

## 2023-04-19 PROCEDURE — 86376 MICROSOMAL ANTIBODY EACH: CPT | Mod: ZL

## 2023-04-19 PROCEDURE — 84443 ASSAY THYROID STIM HORMONE: CPT | Mod: ZL

## 2023-04-19 PROCEDURE — 84439 ASSAY OF FREE THYROXINE: CPT | Mod: ZL

## 2023-04-19 PROCEDURE — 36415 COLL VENOUS BLD VENIPUNCTURE: CPT | Mod: ZL

## 2023-04-20 LAB — THYROPEROXIDASE AB SERPL-ACNC: 21 IU/ML

## 2023-06-14 ENCOUNTER — TELEPHONE (OUTPATIENT)
Dept: FAMILY MEDICINE | Facility: OTHER | Age: 68
End: 2023-06-14
Payer: COMMERCIAL

## 2023-06-14 DIAGNOSIS — H90.6 MIXED HEARING LOSS, BILATERAL: Primary | ICD-10-CM

## 2023-06-14 NOTE — TELEPHONE ENCOUNTER
Reason for call: Request for a referral.    Referral requested for what concern?  Audiology - hearing test    Have you already been seen by the specialty you need the referral for?  No    Date:   6/30/23,  Location:   [facility]  Additional comments:   Insurance referral    Preferred method for responding to this message: Telephone Call    Phone number patient can be reached at? Cell number on file:    Telephone Information:   Mobile [phone]       If we can't reach you directly, may we leave a detailed response at the number you provided? Yes     Paula Torres on 6/14/2023 at 3:39 PM

## 2023-07-06 ENCOUNTER — TELEPHONE (OUTPATIENT)
Dept: FAMILY MEDICINE | Facility: OTHER | Age: 68
End: 2023-07-06
Payer: COMMERCIAL

## 2023-07-06 DIAGNOSIS — H93.13 TINNITUS, BILATERAL: Primary | ICD-10-CM

## 2023-07-06 NOTE — TELEPHONE ENCOUNTER
Patient was recommended to go see a ENT doctor by her audiologist, but she wants to talk to Dr Oconnor to see if it is necessary.      Estefany Lema on 7/6/2023 at 8:24 AM

## 2023-07-06 NOTE — TELEPHONE ENCOUNTER
Advised patient TATIANAP is out of the office until 07/10/23. Patient states she is okay with waiting for his response. She states that TJP is aware of the patient's tinnitus and is wanting his recommendations on seeing an ENT.  Asha Granado LPN

## 2023-07-13 ENCOUNTER — OFFICE VISIT (OUTPATIENT)
Dept: FAMILY MEDICINE | Facility: OTHER | Age: 68
End: 2023-07-13
Attending: NURSE PRACTITIONER
Payer: COMMERCIAL

## 2023-07-13 VITALS
RESPIRATION RATE: 16 BRPM | DIASTOLIC BLOOD PRESSURE: 68 MMHG | WEIGHT: 140.8 LBS | BODY MASS INDEX: 24.04 KG/M2 | SYSTOLIC BLOOD PRESSURE: 140 MMHG | TEMPERATURE: 97.9 F | OXYGEN SATURATION: 99 % | HEART RATE: 95 BPM | HEIGHT: 64 IN

## 2023-07-13 DIAGNOSIS — J01.90 ACUTE NON-RECURRENT SINUSITIS, UNSPECIFIED LOCATION: Primary | ICD-10-CM

## 2023-07-13 PROCEDURE — G0463 HOSPITAL OUTPT CLINIC VISIT: HCPCS

## 2023-07-13 PROCEDURE — 99213 OFFICE O/P EST LOW 20 MIN: CPT | Performed by: FAMILY MEDICINE

## 2023-07-13 RX ORDER — CEPHALEXIN 500 MG/1
500 CAPSULE ORAL 3 TIMES DAILY
Qty: 21 CAPSULE | Refills: 0 | Status: SHIPPED | OUTPATIENT
Start: 2023-07-13 | End: 2024-01-11

## 2023-07-13 ASSESSMENT — PAIN SCALES - GENERAL: PAINLEVEL: MODERATE PAIN (4)

## 2023-07-13 NOTE — PROGRESS NOTES
"    (J01.90) Acute non-recurrent sinusitis, unspecified location  (primary encounter diagnosis)  Comment:   Plan: cephALEXin (KEFLEX) 500 MG capsule        Symptomatic measures discussed.      CHIEF COMPLAINT    Congestion and sore throat.      HISTORY    She has been bothered by this for about a week.  Initially sore throat and postnasal drainage.  Got better for a few days now has become worse again in the last couple of days.  She is getting some sputum production.  No ear pain.  No fever noted.  No wheezing or SOB.    She has multiple antibiotic allergies/intolerance.      REVIEW OF SYSTEMS    As above.  Unremarkable except as above.        EXAM  BP (!) 140/68 (BP Location: Left arm, Patient Position: Sitting, Cuff Size: Adult Regular)   Pulse 95   Temp 97.9  F (36.6  C) (Temporal)   Resp 16   Ht 1.626 m (5' 4\")   Wt 63.9 kg (140 lb 12.8 oz)   LMP  (LMP Unknown)   SpO2 99%   BMI 24.17 kg/m      TMs appear WNL.  Nasal congestion noted.  Pharynx minimal redness, no swelling.  No significant cervical adenopathy.  Lungs are clear.  "

## 2023-07-13 NOTE — NURSING NOTE
"Patient presents to the clinic for sore throat that started on 07/08/23. Patient rates pain currently at a 4 out of 10. Patient states at night pain is 8 out of 10, sharp and burning. Patient reported coughing up green and white phlegm, and earlier in the week she reports coughing up bigger chunks that had some red in them. Patient also reports having less energy this week and having trouble sleeping.    FOOD SECURITY SCREENING QUESTIONS:    The next two questions are to help us understand your food security.  If you are feeling you need any assistance in this area, we have resources available to support you today.    Hunger Vital Signs:  Within the past 12 months we worried whether our food would run out before we got money to buy more. Never  Within the past 12 months the food we bought just didn't last and we didn't have money to get more. Never    Advance Care Directive on file? No  Advance Care Directive provided to patient? Declined     Chief Complaint   Patient presents with     Throat Problem     Sore throat and cough        Initial BP (!) 140/68 (BP Location: Left arm, Patient Position: Sitting, Cuff Size: Adult Regular)   Pulse 95   Temp 97.9  F (36.6  C) (Temporal)   Resp 16   Ht 1.626 m (5' 4\")   Wt 63.9 kg (140 lb 12.8 oz)   LMP  (LMP Unknown)   SpO2 99%   BMI 24.17 kg/m   Estimated body mass index is 24.17 kg/m  as calculated from the following:    Height as of this encounter: 1.626 m (5' 4\").    Weight as of this encounter: 63.9 kg (140 lb 12.8 oz).  Medication Reconciliation: complete        Wendi Gutiérrez LPN on 7/13/2023 at 10:24 AM    "

## 2023-07-30 DIAGNOSIS — E28.319 PREMATURE MENOPAUSE ON HRT: ICD-10-CM

## 2023-07-30 DIAGNOSIS — Z79.890 PREMATURE MENOPAUSE ON HRT: ICD-10-CM

## 2023-08-01 RX ORDER — ESTERIFIED ESTROGEN AND METHYLTESTOSTERONE 1.25; 2.5 MG/1; MG/1
TABLET ORAL
Qty: 90 TABLET | Refills: 1 | Status: SHIPPED | OUTPATIENT
Start: 2023-08-01 | End: 2024-01-11

## 2023-08-01 NOTE — TELEPHONE ENCOUNTER
[id number] in [employer] [facility] [place] sent Rx request for the following:      Requested Prescriptions   Pending Prescriptions Disp Refills    estrogens-methylTESTOSTERone (ESTRATEST) 1.25-2.5 MG per tablet [Pharmacy Med Name: ESTROGEN-METHYLTESTOS F.S. TAB] 90 tablet      Sig: TAKE 1 TABLET BY MOUTH EVERY DAY     Last Prescription Date:   6/29/22  Last Fill Qty/Refills:         90, R-3    Last Office Visit:              1/9/23   Future Office visit:           1/11/24    Per LOV note:  Return in about 53 weeks (around 1/15/2024) for Annual Wellness Visit.     Bekah Robles RN .............. 8/1/2023  10:18 AM

## 2023-09-12 ENCOUNTER — OFFICE VISIT (OUTPATIENT)
Dept: OTOLARYNGOLOGY | Facility: OTHER | Age: 68
End: 2023-09-12
Attending: OTOLARYNGOLOGY
Payer: MEDICARE

## 2023-09-12 DIAGNOSIS — E04.1 THYROID NODULE: Primary | ICD-10-CM

## 2023-09-12 DIAGNOSIS — H93.A3 PULSATILE TINNITUS, BILATERAL: ICD-10-CM

## 2023-09-12 PROCEDURE — G0463 HOSPITAL OUTPT CLINIC VISIT: HCPCS

## 2023-09-12 NOTE — NURSING NOTE
Patient presents today for hearing loss.    Medication Reconciliation Complete    Marquita Pedersen LPN  9/12/2023 12:50 PM

## 2023-09-14 NOTE — PROGRESS NOTES
document embedded image  Patient Name: Miguelina Estrada    Address: [address] [place] [address]     YOB: 1955    [place] [address]    MR Number: [id number]    Phone: [phone]  PCP: Fady Oconnor MD            Appointment Date: 09/12/23   Visit Provider: Lowell Maza MD    cc: Fady Oconnor MD; ~    ENT Progress Note  Intake  Visit Reasons: Hearing loss / Goiter    HPI  History of Present Illness  Chief complaint: Hearing loss, goiter    History  The patient is a 67-year-old female who presents to the office today with 2 concerns 1 is regarding some increased difficulty hearing in the 2nd is regarding her thyroid goiter.  On further questioning and evaluation of the chart, it appears she does not in fact have a goiter.  She has an isolated nodule in the right lobe of her thyroid.  This nodule was stable on ultrasounds between 2020 and 2022.  She occasionally feels some tightness in her neck.  She is no other symptoms in regards to the thyroid.    She also complains of some hearing difficulties and pulsatile tinnitus in her ears.  She underwent an extensive workup in 2000 in regards to the noise in her ears.  She had MRIs and MRAs of her head and neck performed which were unremarkable.    Exam  The external auditory canals and TMs are clear bilaterally  Oral cavity oropharynx-free of mucosal lesions or inflammation  Indirect laryngoscopy reveals neurologically intact larynx without mucosal lesion  Neck-there is no palpable masses or adenopathy.  I could not specifically feel a thyroid nodule at this time  Head and neck integument-Clear  General-the patient appears well and in distress  Neuro-there are no focal cranial nerve deficits  Audiogram-symmetric normal hearing the symmetric normal tympanograms    Allergies    Penicillins Allergy (Mild, Verified 09/13/23 13:39)  Rash  Sulfa (Sulfonamide Antibiotics) Allergy (Mild, Verified 09/13/23 13:39)  Rash  clindamycin Allergy (Severe, Uncoded  09/13/23 13:39)  Difficulty Breathing  erythromycin Allergy (Intermediate, Uncoded 09/13/23 13:39)  Vomiting  levofloxacin Allergy (Intermediate, Uncoded 09/13/23 13:39)  Palpitations    PFSH  PFSH:     Social History: (Updated 09/13/23 @ 13:39 by Jaun Martines, Med Assist)  Smoking Status:  Never smoker  second hand exposure:  Yes  alcohol intake:  never  substance use type:  does not use       A&P  Assessment & Plan  (1) Pulsatile tinnitus, bilateral:        Status: Acute        Code(s):  H93.A3 - Pulsatile tinnitus, bilateral           (2) Thyroid nodule:        Status: Chronic        Code(s):  E04.1 - Nontoxic single thyroid nodule             Plan  Go ahead and get an ultrasound of I ordered.  She will be notified of test results as they return.  She was reassured her hearing is normal.  We briefly discussed tinnitus and tinnitus management.               Lowell Maza MD    Filed: 09/13/23 [id number]    <Electronically signed by Lowell Maza MD> 09/13/23 3608

## 2023-10-10 ENCOUNTER — HOSPITAL ENCOUNTER (OUTPATIENT)
Dept: MAMMOGRAPHY | Facility: OTHER | Age: 68
Discharge: HOME | End: 2023-10-10
Attending: FAMILY MEDICINE
Payer: MEDICARE

## 2023-10-10 ENCOUNTER — HOSPITAL ENCOUNTER (OUTPATIENT)
Dept: ULTRASOUND IMAGING | Facility: OTHER | Age: 68
Discharge: HOME | End: 2023-10-10
Attending: OTOLARYNGOLOGY
Payer: MEDICARE

## 2023-10-10 DIAGNOSIS — E04.1 THYROID NODULE: ICD-10-CM

## 2023-10-10 DIAGNOSIS — Z12.31 VISIT FOR SCREENING MAMMOGRAM: ICD-10-CM

## 2023-10-10 PROCEDURE — 77067 SCR MAMMO BI INCL CAD: CPT

## 2023-10-10 PROCEDURE — 76536 US EXAM OF HEAD AND NECK: CPT

## 2023-10-20 ENCOUNTER — OFFICE VISIT (OUTPATIENT)
Dept: FAMILY MEDICINE | Facility: OTHER | Age: 68
End: 2023-10-20
Attending: NURSE PRACTITIONER
Payer: COMMERCIAL

## 2023-10-20 VITALS
RESPIRATION RATE: 16 BRPM | OXYGEN SATURATION: 98 % | TEMPERATURE: 97.4 F | HEART RATE: 76 BPM | DIASTOLIC BLOOD PRESSURE: 80 MMHG | SYSTOLIC BLOOD PRESSURE: 136 MMHG | HEIGHT: 64 IN | WEIGHT: 140.8 LBS | BODY MASS INDEX: 24.04 KG/M2

## 2023-10-20 DIAGNOSIS — E03.8 SUBCLINICAL HYPOTHYROIDISM: ICD-10-CM

## 2023-10-20 DIAGNOSIS — L71.9 ROSACEA: Primary | ICD-10-CM

## 2023-10-20 LAB — TSH SERPL DL<=0.005 MIU/L-ACNC: 3.99 UIU/ML (ref 0.3–4.2)

## 2023-10-20 PROCEDURE — 84443 ASSAY THYROID STIM HORMONE: CPT | Mod: ZL | Performed by: NURSE PRACTITIONER

## 2023-10-20 PROCEDURE — 99214 OFFICE O/P EST MOD 30 MIN: CPT | Performed by: NURSE PRACTITIONER

## 2023-10-20 PROCEDURE — 36415 COLL VENOUS BLD VENIPUNCTURE: CPT | Mod: ZL | Performed by: NURSE PRACTITIONER

## 2023-10-20 PROCEDURE — G0463 HOSPITAL OUTPT CLINIC VISIT: HCPCS

## 2023-10-20 RX ORDER — DOXYCYCLINE 50 MG/1
50 TABLET ORAL 2 TIMES DAILY
Qty: 180 TABLET | Refills: 0 | Status: SHIPPED | OUTPATIENT
Start: 2023-10-20 | End: 2024-01-11

## 2023-10-20 ASSESSMENT — PAIN SCALES - GENERAL: PAINLEVEL: MILD PAIN (2)

## 2023-10-20 NOTE — NURSING NOTE
Chief Complaint   Patient presents with    Infection         Medication Reconciliation: complete    Veronica Santos, LPN

## 2023-10-20 NOTE — PROGRESS NOTES
Assessment & Plan   Problem List Items Addressed This Visit          Musculoskeletal and Integumentary    Rosacea - Primary    Relevant Medications    doxycycline monohydrate (ADOXA) 50 MG tablet     Other Visit Diagnoses       Subclinical hypothyroidism        Relevant Orders    TSH Reflex GH (Completed)        Discussed rosacea symptoms and need for daily moisturizer with a product that she is tolerating.  Recommend continue with MetroGel, will start her on doxycycline 50 mg twice daily for 12 weeks, she will follow-up with her primary care provider in January as scheduled, sooner if she is having concerns.  Thyroid level was checked today and was within normal limits.    Review of the result(s) of each unique test - TSH  Ordering of each unique test  Prescription drug management        No follow-ups on file.    [facility]    Subjective   Muna is a 67 year old, presenting for the following health issues:  Infection        10/20/2023    10:15 AM   Additional Questions   Roomed by Veronica Santos       History of Present Illness       Reason for visit:  Infection on nose that won't clear up  Symptom onset:  More than a month  Symptoms include:  Redness, pus, sore, swelling  Symptom intensity:  Moderate  Symptom progression:  Staying the same  Had these symptoms before:  Yes  Has tried/received treatment for these symptoms:  Yes  Previous treatment was successful:  Yes  Prior treatment description:  Metronidazole .75% 2 x's a day but no longer working  What makes it worse:  Not sure.  It gets a little better then comes back  What makes it better:  Using Cetaphil face wash and Metronidazole but infection comes back    She eats 2-3 servings of fruits and vegetables daily.She consumes 1 sweetened beverage(s) daily.She exercises with enough effort to increase her heart rate 9 or less minutes per day.  She exercises with enough effort to increase her heart rate 3 or less days  "per week.   She is taking medications regularly.     She comes in today for evaluation of facial infection.  She reports she does have rosacea, this had previously been under control since 2015.  Over the summer she has had frequent flareups of redness and pustules to her nose and forehead.  This has waxed and waned.  She has been using MetroGel, dietary changes.  She was on Keflex in July for sinus infection and did not notice any improvement in symptoms at that time.  She did see her eye doctor 2 months ago, moderately flared and eye doctor told her she has rosacea in her eyes.  She has been caring for her dad who has MRSA in his legs and is worried that she may have MRSA on her face.    She also has had previous elevated thyroid levels, most recently, April this had come down to just above normal.  She is not currently taking medications, this has been discussed in the past but is opted to avoid at that time.  She is worried that her thyroid may be affecting her skin issues and would like to have this checked today.      Review of Systems   See above      Objective    /80   Pulse 76   Temp 97.4  F (36.3  C) (Tympanic)   Resp 16   Ht 1.626 m (5' 4\")   Wt 63.9 kg (140 lb 12.8 oz)   LMP  (LMP Unknown)   SpO2 98%   Breastfeeding No   BMI 24.17 kg/m    Body mass index is 24.17 kg/m .  Physical Exam   GENERAL: healthy, alert and no distress  SKIN: Red and irritated dry forehead and nose.  No open wounds, no pustules appreciated.  NEURO: Normal strength and tone, mentation intact and speech normal  PSYCH: mentation appears normal, affect normal/bright                      "

## 2023-12-20 ENCOUNTER — TELEPHONE (OUTPATIENT)
Dept: FAMILY MEDICINE | Facility: OTHER | Age: 68
End: 2023-12-20
Payer: COMMERCIAL

## 2023-12-20 NOTE — TELEPHONE ENCOUNTER
States she's been taking doxycycline and she has been having stomach pain after taking it. She thinks it might be making her GERD worse. Would like to know what to do

## 2023-12-20 NOTE — TELEPHONE ENCOUNTER
S-(situation): Has been taking Doxycycline since 10/20/23 for hr face rosacea.     B-(background): Face and eyes are getting better. However, patient is having increase in her GERD symptoms. States she is taking medications in the am with her breakfast- normally cereal and almond milk, has increased water intake. Drinks milad tea for her stomach in the morning.     A-(assessment): Side effects from medication despite taking as directed are making her GERD worse.    R-(recommendations): Will route to provider for further recommendations on how to take medication to reduce side effects.     Sera Garner RN on 12/20/2023 at 12:01 PM

## 2023-12-21 NOTE — TELEPHONE ENCOUNTER
Patient was notified and is going to try taking the medication every other day.    Marquita Pedersen LPN on 12/21/2023 at 11:56 AM

## 2023-12-21 NOTE — TELEPHONE ENCOUNTER
This is hard as the med can cause GI upset no matter how you take it. Can try taking it every other day for a while. Can change to topical Metronidazole if she prefers, no GI side effects when this is used topically.

## 2024-01-05 ASSESSMENT — ENCOUNTER SYMPTOMS
NAUSEA: 0
MYALGIAS: 0
DYSURIA: 0
HEMATURIA: 0
SORE THROAT: 0
ABDOMINAL PAIN: 0
DIZZINESS: 0
FREQUENCY: 0
FEVER: 0
HEMATOCHEZIA: 0
SHORTNESS OF BREATH: 0
PARESTHESIAS: 0
PALPITATIONS: 0
COUGH: 0
HEARTBURN: 0
HEADACHES: 0
EYE PAIN: 0
BREAST MASS: 0
ARTHRALGIAS: 0
JOINT SWELLING: 0
CONSTIPATION: 0
CHILLS: 0
DIARRHEA: 0
NERVOUS/ANXIOUS: 0
WEAKNESS: 0

## 2024-01-05 ASSESSMENT — ACTIVITIES OF DAILY LIVING (ADL): CURRENT_FUNCTION: NO ASSISTANCE NEEDED

## 2024-01-11 ENCOUNTER — OFFICE VISIT (OUTPATIENT)
Dept: FAMILY MEDICINE | Facility: OTHER | Age: 69
End: 2024-01-11
Attending: FAMILY MEDICINE
Payer: COMMERCIAL

## 2024-01-11 VITALS
BODY MASS INDEX: 23.49 KG/M2 | RESPIRATION RATE: 16 BRPM | DIASTOLIC BLOOD PRESSURE: 62 MMHG | HEIGHT: 64 IN | OXYGEN SATURATION: 99 % | WEIGHT: 137.6 LBS | TEMPERATURE: 96.9 F | HEART RATE: 67 BPM | SYSTOLIC BLOOD PRESSURE: 128 MMHG

## 2024-01-11 DIAGNOSIS — L71.9 ROSACEA: ICD-10-CM

## 2024-01-11 DIAGNOSIS — Z00.00 ENCOUNTER FOR MEDICARE ANNUAL WELLNESS EXAM: Primary | ICD-10-CM

## 2024-01-11 DIAGNOSIS — I48.0 PAROXYSMAL ATRIAL FIBRILLATION (H): ICD-10-CM

## 2024-01-11 DIAGNOSIS — Z29.11 NEED FOR VACCINATION AGAINST RESPIRATORY SYNCYTIAL VIRUS: ICD-10-CM

## 2024-01-11 DIAGNOSIS — E89.40 ASYMPTOMATIC POSTSURGICAL MENOPAUSE: ICD-10-CM

## 2024-01-11 DIAGNOSIS — Z79.890 PREMATURE MENOPAUSE ON HRT: ICD-10-CM

## 2024-01-11 DIAGNOSIS — E28.319 PREMATURE MENOPAUSE ON HRT: ICD-10-CM

## 2024-01-11 LAB
ANION GAP SERPL CALCULATED.3IONS-SCNC: 8 MMOL/L (ref 7–15)
BUN SERPL-MCNC: 10.5 MG/DL (ref 8–23)
CALCIUM SERPL-MCNC: 9.5 MG/DL (ref 8.8–10.2)
CHLORIDE SERPL-SCNC: 101 MMOL/L (ref 98–107)
CREAT SERPL-MCNC: 0.86 MG/DL (ref 0.51–0.95)
DEPRECATED HCO3 PLAS-SCNC: 28 MMOL/L (ref 22–29)
EGFRCR SERPLBLD CKD-EPI 2021: 73 ML/MIN/1.73M2
ERYTHROCYTE [DISTWIDTH] IN BLOOD BY AUTOMATED COUNT: 12.9 % (ref 10–15)
GLUCOSE SERPL-MCNC: 91 MG/DL (ref 70–99)
HCT VFR BLD AUTO: 43.6 % (ref 35–47)
HGB BLD-MCNC: 14.9 G/DL (ref 11.7–15.7)
MCH RBC QN AUTO: 31.4 PG (ref 26.5–33)
MCHC RBC AUTO-ENTMCNC: 34.2 G/DL (ref 31.5–36.5)
MCV RBC AUTO: 92 FL (ref 78–100)
PLATELET # BLD AUTO: 237 10E3/UL (ref 150–450)
POTASSIUM SERPL-SCNC: 4.3 MMOL/L (ref 3.4–5.3)
RBC # BLD AUTO: 4.75 10E6/UL (ref 3.8–5.2)
SODIUM SERPL-SCNC: 137 MMOL/L (ref 135–145)
WBC # BLD AUTO: 4.4 10E3/UL (ref 4–11)

## 2024-01-11 PROCEDURE — 36415 COLL VENOUS BLD VENIPUNCTURE: CPT | Mod: ZL | Performed by: FAMILY MEDICINE

## 2024-01-11 PROCEDURE — G0463 HOSPITAL OUTPT CLINIC VISIT: HCPCS

## 2024-01-11 PROCEDURE — 85027 COMPLETE CBC AUTOMATED: CPT | Mod: ZL | Performed by: FAMILY MEDICINE

## 2024-01-11 PROCEDURE — 99214 OFFICE O/P EST MOD 30 MIN: CPT | Mod: 25 | Performed by: FAMILY MEDICINE

## 2024-01-11 PROCEDURE — 80048 BASIC METABOLIC PNL TOTAL CA: CPT | Mod: ZL | Performed by: FAMILY MEDICINE

## 2024-01-11 PROCEDURE — G0439 PPPS, SUBSEQ VISIT: HCPCS | Performed by: FAMILY MEDICINE

## 2024-01-11 RX ORDER — ESTERIFIED ESTROGEN AND METHYLTESTOSTERONE 1.25; 2.5 MG/1; MG/1
1 TABLET ORAL DAILY
Qty: 90 TABLET | Refills: 4 | Status: SHIPPED | OUTPATIENT
Start: 2024-01-11 | End: 2024-01-15

## 2024-01-11 RX ORDER — RESPIRATORY SYNCYTIAL VIRUS VACCINE 120MCG/0.5
0.5 KIT INTRAMUSCULAR ONCE
Qty: 1 EACH | Refills: 0 | Status: SHIPPED | OUTPATIENT
Start: 2024-01-11 | End: 2024-01-11

## 2024-01-11 RX ORDER — DOXYCYCLINE HYCLATE 50 MG/1
50 CAPSULE ORAL 2 TIMES DAILY
Qty: 180 CAPSULE | Refills: 4 | Status: SHIPPED | OUTPATIENT
Start: 2024-01-11 | End: 2024-03-04

## 2024-01-11 ASSESSMENT — ENCOUNTER SYMPTOMS
HEMATOCHEZIA: 0
FREQUENCY: 0
ARTHRALGIAS: 0
HEADACHES: 0
NAUSEA: 0
WEAKNESS: 0
DYSURIA: 0
NERVOUS/ANXIOUS: 0
SHORTNESS OF BREATH: 0
DIARRHEA: 0
DIZZINESS: 0
EYE PAIN: 0
HEARTBURN: 0
JOINT SWELLING: 0
SORE THROAT: 0
FEVER: 0
PARESTHESIAS: 0
COUGH: 0
MYALGIAS: 0
CHILLS: 0
PALPITATIONS: 0
HEMATURIA: 0
ABDOMINAL PAIN: 0
BREAST MASS: 0
CONSTIPATION: 0

## 2024-01-11 ASSESSMENT — PAIN SCALES - GENERAL: PAINLEVEL: NO PAIN (0)

## 2024-01-11 ASSESSMENT — ACTIVITIES OF DAILY LIVING (ADL): CURRENT_FUNCTION: NO ASSISTANCE NEEDED

## 2024-01-11 NOTE — PROGRESS NOTES
"SUBJECTIVE:   Muna is a 68 year old, presenting for the following:  Medicare Visit        1/11/2024     8:55 AM   Additional Questions   Roomed by AURELIA Barry   Accompanied by Self         1/11/2024     8:55 AM   Patient Reported Additional Medications   Patient reports taking the following new medications N/A       Are you in the first 12 months of your Medicare coverage?  No    Healthy Habits:     In general, how would you rate your overall health?  Good    Frequency of exercise:  None    Do you usually eat at least 4 servings of fruit and vegetables a day, include whole grains    & fiber and avoid regularly eating high fat or \"junk\" foods?  Yes    Taking medications regularly:  Yes    Medication side effects:  None    Ability to successfully perform activities of daily living:  No assistance needed    Home Safety:  No safety concerns identified    Hearing Impairment:  No hearing concerns    In the past 6 months, have you been bothered by leaking of urine?  No    In general, how would you rate your overall mental or emotional health?  Good      Today's PHQ-2 Score:       1/11/2024     8:52 AM   PHQ-2 ( 1999 [employer])   Q1: Little interest or pleasure in doing things 0   Q2: Feeling down, depressed or hopeless 0   PHQ-2 Score 0   Q1: Little interest or pleasure in doing things Not at all   Q2: Feeling down, depressed or hopeless Not at all   PHQ-2 Score 0           Have you ever done Advance Care Planning? (For example, a Health Directive, POLST, or a discussion with a medical provider or your loved ones about your wishes): Yes, patient states has an Advance Care Planning document and will bring a copy to the clinic.       Fall risk  Fallen 2 or more times in the past year?: No  Any fall with injury in the past year?: Yes    Cognitive Screening   1) Repeat 3 items (Leader, Season, Table)    2) Clock draw: NORMAL  3) 3 item recall: Recalls 3 objects  Results: 3 items recalled: COGNITIVE IMPAIRMENT LESS " LIKELY    Mini-CogTM Copyright LORI Cortés. Licensed by the author for use in [facility]; reprinted with permission (pilar@[facility]). All rights reserved.      Do you have sleep apnea, excessive snoring or daytime drowsiness? : no    Reviewed and updated as needed this visit by clinical staff   Tobacco  Allergies  Meds   Med Hx  Surg Hx  Fam Hx  Soc Hx        Reviewed and updated as needed this visit by Provider                 Social History     Tobacco Use     Smoking status: Never     Smokeless tobacco: Never   Substance Use Topics     Alcohol use: No     Alcohol/week: 0.0 standard drinks of alcohol             1/5/2024     9:36 AM   Alcohol Use   Prescreen: >3 drinks/day or >7 drinks/week? Not Applicable     Do you have a current opioid prescription? No  Do you use any other controlled substances or medications that are not prescribed by a provider? None              Current providers sharing in care for this patient include:    Patient Care Team:  Fady Oconnor MD as PCP - General (Family Practice)  Fady Oconnor MD as Assigned PCP    The following health maintenance items are reviewed in Epic and correct as of today:  Health Maintenance   Topic Date Due     RSV VACCINE (Pregnancy & 60+) (1 - 1-dose 60+ series) Never done     MEDICARE ANNUAL WELLNESS VISIT  01/09/2024     FALL RISK ASSESSMENT  01/11/2025     MAMMO SCREENING  10/10/2025     LIPID  01/07/2027     COLORECTAL CANCER SCREENING  08/11/2027     DTAP/TDAP/TD IMMUNIZATION (2 - Td or Tdap) 09/14/2027     ADVANCE CARE PLANNING  01/11/2028     DEXA  09/07/2031     PHQ-2 (once per calendar year)  Completed     INFLUENZA VACCINE  Completed     Pneumococcal Vaccine: 65+ Years  Completed     ZOSTER IMMUNIZATION  Completed     COVID-19 Vaccine  Completed     IPV IMMUNIZATION  Aged Out     HPV IMMUNIZATION  Aged Out     MENINGITIS IMMUNIZATION  Aged Out     RSV MONOCLONAL ANTIBODY  Aged Out     HEPATITIS C SCREENING  Discontinued     Current  Outpatient Medications   Medication Sig Dispense Refill     estrogens-methylTESTOSTERone (ESTRATEST) 1.25-2.5 MG per tablet Take 1 tablet by mouth daily 90 tablet 4     Lysine HCl 1000 MG TABS Take 1,000 mg by mouth daily       metroNIDAZOLE (METROGEL) 0.75 % external gel Apply topically 2 times daily 45 g 3     respiratory syncytial virus vaccine, bivalent (ABRYSVO) injection Inject 0.5 mLs into the muscle once for 1 dose 1 each 0     rivaroxaban ANTICOAGULANT (XARELTO ANTICOAGULANT) 20 MG TABS tablet Take 1 tablet (20 mg) by mouth daily (with dinner) 90 tablet 4     acyclovir (ZOVIRAX) 400 MG tablet Take 1 tablet by mouth twice daily 180 tablet 0     Allergies   Allergen Reactions     Clindamycin Difficulty breathing     Erythromycin Nausea and Vomiting     Levofloxacin Other (See Comments) and Palpitations     Penicillins Rash     Sulfa Antibiotics Rash         FHS-7:       9/21/2021    10:07 AM 10/10/2023     9:34 AM 1/5/2024     9:38 AM   Breast CA Risk Assessment (FHS-7)   Did any of your first-degree relatives have breast or ovarian cancer? No No No   Did any of your relatives have bilateral breast cancer? No No No   Did any man in your family have breast cancer? No No No   Did any woman in your family have breast and ovarian cancer? No No No   Did any woman in your family have breast cancer before age 50 y? No No No   Do you have 2 or more relatives with breast and/or ovarian cancer? No Yes No   Do you have 2 or more relatives with breast and/or bowel cancer? No Yes No         Pertinent mammograms are reviewed under the imaging tab.    Review of Systems   Constitutional:  Negative for chills and fever.   HENT:  Negative for congestion, ear pain, hearing loss and sore throat.    Eyes:  Negative for pain and visual disturbance.   Respiratory:  Negative for cough and shortness of breath.    Cardiovascular:  Negative for chest pain, palpitations and peripheral edema.   Gastrointestinal:  Negative for abdominal  "pain, constipation, diarrhea, heartburn, hematochezia and nausea.   Breasts:  Negative for tenderness, breast mass and discharge.   Genitourinary:  Negative for dysuria, frequency, genital sores, hematuria, pelvic pain, urgency, vaginal bleeding and vaginal discharge.   Musculoskeletal:  Negative for arthralgias, joint swelling and myalgias.   Skin:  Negative for rash.   Neurological:  Negative for dizziness, weakness, headaches and paresthesias.   Psychiatric/Behavioral:  Negative for mood changes. The patient is not nervous/anxious.      Is off her doxy due to gi upset but it has cured her rosacea. Insurance no longer covers tabs, so she wants to change to caps.     Also wants to continue with the HRT. Understands increased breast cancer risk.is taking half tabs daily.         OBJECTIVE:   Pulse 67   Temp 96.9  F (36.1  C) (Tympanic)   Resp 16   Ht 1.626 m (5' 4\")   Wt 62.4 kg (137 lb 9.6 oz)   LMP  (LMP Unknown)   SpO2 99%   BMI 23.62 kg/m   Estimated body mass index is 23.62 kg/m  as calculated from the following:    Height as of this encounter: 1.626 m (5' 4\").    Weight as of this encounter: 62.4 kg (137 lb 9.6 oz).  Physical Exam  GENERAL: healthy, alert and no distress  EYES: Eyes grossly normal to inspection, PERRL and conjunctivae and sclerae normal  HENT: ear canals and TM's normal, nose and mouth without ulcers or lesions  NECK: no adenopathy, no asymmetry, masses, or scars and thyroid normal to palpation  RESP: lungs clear to auscultation - no rales, rhonchi or wheezes  CV: regular rate and rhythm, normal S1 S2, no S3 or S4, no murmur, click or rub, no peripheral edema and peripheral pulses strong  ABDOMEN: soft, nontender, no hepatosplenomegaly, no masses and bowel sounds normal  MS: no gross musculoskeletal defects noted, no edema  SKIN: no suspicious lesions or rashes  NEURO: Normal strength and tone, mentation intact and speech normal  PSYCH: mentation appears normal, affect " normal/bright    Diagnostic Test Results:  Labs reviewed in Epic  Results for orders placed or performed in visit on 01/11/24   CBC W PLT No Diff     Status: Normal   Result Value Ref Range    WBC Count 4.4 4.0 - 11.0 10e3/uL    RBC Count 4.75 3.80 - 5.20 10e6/uL    Hemoglobin 14.9 11.7 - 15.7 g/dL    Hematocrit 43.6 35.0 - 47.0 %    MCV 92 78 - 100 fL    MCH 31.4 26.5 - 33.0 pg    MCHC 34.2 31.5 - 36.5 g/dL    RDW 12.9 10.0 - 15.0 %    Platelet Count 237 150 - 450 10e3/uL   Basic Metabolic Panel     Status: Normal   Result Value Ref Range    Sodium 137 135 - 145 mmol/L    Potassium 4.3 3.4 - 5.3 mmol/L    Chloride 101 98 - 107 mmol/L    Carbon Dioxide (CO2) 28 22 - 29 mmol/L    Anion Gap 8 7 - 15 mmol/L    Urea Nitrogen 10.5 8.0 - 23.0 mg/dL    Creatinine 0.86 0.51 - 0.95 mg/dL    GFR Estimate 73 >60 mL/min/1.73m2    Calcium 9.5 8.8 - 10.2 mg/dL    Glucose 91 70 - 99 mg/dL         ASSESSMENT / PLAN:   (Z00.00) Encounter for Medicare annual wellness exam  (primary encounter diagnosis)  Comment:    Plan:      (Z29.11) Need for vaccination against respiratory syncytial virus  Comment: at pharm  Plan: respiratory syncytial virus vaccine, bivalent         (ABRYSVO) injection             (I48.0) Paroxysmal atrial fibrillation (H)  Comment: stable, refilled without changes  Plan: rivaroxaban ANTICOAGULANT (XARELTO         ANTICOAGULANT) 20 MG TABS tablet, CBC W PLT No         Diff, Basic Metabolic Panel             (E28.319,  Z79.890) Premature menopause on HRT  Comment: continue on this med, she has reduced dose to also therefore reduce her risks. Is UTD on mammos   Plan: estrogens-methylTESTOSTERone (ESTRATEST)         1.25-2.5 MG per tablet             (L71.9) Rosacea  Comment: resolved on exam, will allow her to cycle this dose at 50 milligram twice daily as she feels fit to balance benefit with the GI side effects.   Plan: doxycycline hyclate (VIBRAMYCIN) 50 MG capsule             (E89.40) Asymptomatic postsurgical  menopause  Comment:    Plan: DX Hip/Pelvis/Spine                   COUNSELING:  Reviewed preventive health counseling, as reflected in patient instructions       Regular exercise       Healthy diet/nutrition       Osteoporosis prevention/bone health       (Kiki)menopause management        She reports that she has never smoked. She has never used smokeless tobacco.      Appropriate preventive services were discussed with this patient, including applicable screening as appropriate for fall prevention, nutrition, physical activity, Tobacco-use cessation, weight loss and cognition.  Checklist reviewing preventive services available has been given to the patient.    Reviewed patients plan of care and provided an AVS. The Basic Care Plan (routine screening as documented in Health Maintenance) for Miguelina meets the Care Plan requirement. This Care Plan has been established and reviewed with the Patient.          Fady Oconnor MD  [facility]    Identified Health Risks:  I have reviewed Opioid Use Disorder and Substance Use Disorder risk factors and made any needed referrals.

## 2024-01-11 NOTE — NURSING NOTE
"Chief Complaint   Patient presents with    Medicare Visit       Initial /62   Pulse 67   Temp 96.9  F (36.1  C) (Tympanic)   Resp 16   Ht 1.626 m (5' 4\")   Wt 62.4 kg (137 lb 9.6 oz)   LMP  (LMP Unknown)   SpO2 99%   BMI 23.62 kg/m   Estimated body mass index is 23.62 kg/m  as calculated from the following:    Height as of this encounter: 1.626 m (5' 4\").    Weight as of this encounter: 62.4 kg (137 lb 9.6 oz).  Medication Reconciliation: complete        "

## 2024-01-11 NOTE — PATIENT INSTRUCTIONS
"Patient Education   Personalized Prevention Plan  You are due for the preventive services outlined below.  Your care team is available to assist you in scheduling these services.  If you have already completed any of these items, please share that information with your care team to update in your medical record.  Health Maintenance Due   Topic Date Due     RSV VACCINE (Pregnancy & 60+) (1 - 1-dose 60+ series) Never done     Learning About Being Physically Active  What is physical activity?     Being physically active means doing any kind of activity that gets your body moving.  The types of physical activity that can help you get fit and stay healthy include:  Aerobic or \"cardio\" activities. These make your heart beat faster and make you breathe harder, such as brisk walking, riding a bike, or running. They strengthen your heart and lungs and build up your endurance.  Strength training activities. These make your muscles work against, or \"resist,\" something. Examples include lifting weights or doing push-ups. These activities help tone and strengthen your muscles and bones.  Stretches. These let you move your joints and muscles through their full range of motion. Stretching helps you be more flexible.  Reaching a balance between these three types of physical activity is important because each one contributes to your overall fitness.  What are the benefits of being active?  Being active is one of the best things you can do for your health. It helps you to:  Feel stronger and have more energy to do all the things you like to do.  Focus better at school or work.  Feel, think, and sleep better.  Reach and stay at a healthy weight.  Lose fat and build lean muscle.  Lower your risk for serious health problems, including diabetes, heart attack, high blood pressure, and some cancers.  Keep your heart, lungs, bones, muscles, and joints strong and healthy.  How can you make being active part of your life?  Start slowly. Make " "it your long-term goal to get at least 30 minutes of exercise on most days of the week. Walking is a good choice. You also may want to do other activities, such as running, swimming, cycling, or playing tennis or team sports.  Pick activities that you like--ones that make your heart beat faster, your muscles stronger, and your muscles and joints more flexible. If you find more than one thing you like doing, do them all. You don't have to do the same thing every day.  Get your heart pumping every day. Any activity that makes your heart beat faster and keeps it at that rate for a while counts.  Here are some great ways to get your heart beating faster:  Go for a brisk walk, run, or hike.  Go for a swim or bike ride.  Take an online exercise class or dance.  Play a game of touch football, basketball, or soccer.  Play tennis, pickleball, or racquetball.  Climb stairs.  Even some household chores can be aerobic. Just do them at a faster pace. Raking or mowing the lawn, sweeping the garage, and vacuuming and cleaning your home all can help get your heart rate up.  Strengthen your muscles during the week. You don't have to lift heavy weights or grow big, bulky muscles to get stronger. Doing a few simple activities that make your muscles work against, or \"resist,\" something can help you get stronger. Aim for at least twice a week.  For example, you can:  Do push-ups or sit-ups, which use your own body weight as resistance.  Lift weights or dumbbells or use stretch bands at home or in a gym or community center.  Stretch your muscles often. Stretching will help you as you become more active. It can help you stay flexible and loosen tight muscles. It can also help improve your balance and posture and can be a great way to relax.  Be sure to stretch the muscles you'll be using when you work out. It's best to warm your muscles slightly before you stretch them. Walk or do some other light aerobic activity for a few minutes. Then " "start stretching.  When you stretch your muscles:  Do it slowly. Stretching is not about going fast or making sudden movements.  Don't push or bounce during a stretch.  Hold each stretch for at least 15 to 30 seconds, if you can. You should feel a stretch in the muscle, but not pain.  Breathe out as you do the stretch. Then breathe in as you hold the stretch. Don't hold your breath.  If you're worried about how more activity might affect your health, have a checkup before you start. Follow any special advice your doctor gives you for getting a smart start.  Where can you learn more?  Go to https://www.[employer].net/patiented  Enter W332 in the search box to learn more about \"Learning About Being Physically Active.\"  Current as of: June 6, 2023               Content Version: 13.8    3148-8998 [employer].   Care instructions adapted under license by your healthcare professional. If you have questions about a medical condition or this instruction, always ask your healthcare professional. [employer] disclaims any warranty or liability for your use of this information.         "

## 2024-01-15 ENCOUNTER — TELEPHONE (OUTPATIENT)
Dept: FAMILY MEDICINE | Facility: OTHER | Age: 69
End: 2024-01-15
Payer: COMMERCIAL

## 2024-01-15 DIAGNOSIS — Z79.890 PREMATURE MENOPAUSE ON HRT: ICD-10-CM

## 2024-01-15 DIAGNOSIS — E28.319 PREMATURE MENOPAUSE ON HRT: ICD-10-CM

## 2024-01-15 DIAGNOSIS — L71.9 ROSACEA: Primary | ICD-10-CM

## 2024-01-15 RX ORDER — DOXYCYCLINE 50 MG/1
50 CAPSULE ORAL 2 TIMES DAILY
Qty: 180 CAPSULE | Refills: 4 | Status: SHIPPED | OUTPATIENT
Start: 2024-01-15

## 2024-01-15 RX ORDER — ESTERIFIED ESTROGEN AND METHYLTESTOSTERONE 1.25; 2.5 MG/1; MG/1
1 TABLET ORAL DAILY
Qty: 90 TABLET | Refills: 4 | Status: SHIPPED | OUTPATIENT
Start: 2024-01-15

## 2024-01-15 NOTE — TELEPHONE ENCOUNTER
Please send the RX - Estrogen Mdthyltestos    to [facility].  It went to [facility] and that's not the correct Pharmacy.      Asha Diaz on 1/15/2024 at 9:50 AM

## 2024-01-16 DIAGNOSIS — B02.30 OPHTHALMIC HERPES ZOSTER: ICD-10-CM

## 2024-01-16 RX ORDER — ACYCLOVIR 400 MG/1
TABLET ORAL
Qty: 180 TABLET | Refills: 0 | Status: SHIPPED | OUTPATIENT
Start: 2024-01-16 | End: 2024-05-17

## 2024-02-08 ENCOUNTER — HOSPITAL ENCOUNTER (OUTPATIENT)
Dept: BONE DENSITY | Facility: OTHER | Age: 69
Discharge: HOME | End: 2024-02-08
Attending: FAMILY MEDICINE | Admitting: FAMILY MEDICINE
Payer: MEDICARE

## 2024-02-08 DIAGNOSIS — E89.40 ASYMPTOMATIC POSTSURGICAL MENOPAUSE: ICD-10-CM

## 2024-02-08 PROCEDURE — 77080 DXA BONE DENSITY AXIAL: CPT

## 2024-02-23 ENCOUNTER — OFFICE VISIT (OUTPATIENT)
Dept: FAMILY MEDICINE | Facility: OTHER | Age: 69
End: 2024-02-23
Payer: MEDICARE

## 2024-02-23 ENCOUNTER — HOSPITAL ENCOUNTER (EMERGENCY)
Facility: OTHER | Age: 69
Discharge: HOME | End: 2024-02-23
Attending: PHYSICIAN ASSISTANT | Admitting: PHYSICIAN ASSISTANT
Payer: MEDICARE

## 2024-02-23 ENCOUNTER — APPOINTMENT (OUTPATIENT)
Dept: CT IMAGING | Facility: OTHER | Age: 69
End: 2024-02-23
Attending: PHYSICIAN ASSISTANT
Payer: MEDICARE

## 2024-02-23 VITALS
DIASTOLIC BLOOD PRESSURE: 76 MMHG | SYSTOLIC BLOOD PRESSURE: 156 MMHG | HEART RATE: 115 BPM | WEIGHT: 138.3 LBS | OXYGEN SATURATION: 99 % | BODY MASS INDEX: 23.74 KG/M2 | TEMPERATURE: 100 F | RESPIRATION RATE: 20 BRPM

## 2024-02-23 VITALS
DIASTOLIC BLOOD PRESSURE: 68 MMHG | HEIGHT: 64 IN | HEART RATE: 90 BPM | SYSTOLIC BLOOD PRESSURE: 129 MMHG | RESPIRATION RATE: 11 BRPM | BODY MASS INDEX: 23.56 KG/M2 | OXYGEN SATURATION: 98 % | TEMPERATURE: 99.6 F | WEIGHT: 138 LBS

## 2024-02-23 DIAGNOSIS — K21.9 GASTROESOPHAGEAL REFLUX DISEASE: ICD-10-CM

## 2024-02-23 DIAGNOSIS — R07.89 CHEST DISCOMFORT: Primary | ICD-10-CM

## 2024-02-23 DIAGNOSIS — R07.9 CHEST PAIN: ICD-10-CM

## 2024-02-23 DIAGNOSIS — R05.1 ACUTE COUGH: ICD-10-CM

## 2024-02-23 LAB
ALBUMIN SERPL BCG-MCNC: 4.8 G/DL (ref 3.5–5.2)
ALP SERPL-CCNC: 78 U/L (ref 40–150)
ALT SERPL W P-5'-P-CCNC: 21 U/L (ref 0–50)
ANION GAP SERPL CALCULATED.3IONS-SCNC: 12 MMOL/L (ref 7–15)
APTT PPP: 35 SECONDS (ref 22–38)
AST SERPL W P-5'-P-CCNC: 27 U/L (ref 0–45)
BASOPHILS # BLD AUTO: 0 10E3/UL (ref 0–0.2)
BASOPHILS NFR BLD AUTO: 0 %
BILIRUB DIRECT SERPL-MCNC: <0.2 MG/DL (ref 0–0.3)
BILIRUB SERPL-MCNC: 0.7 MG/DL
BUN SERPL-MCNC: 8.7 MG/DL (ref 8–23)
CALCIUM SERPL-MCNC: 9.9 MG/DL (ref 8.8–10.2)
CHLORIDE SERPL-SCNC: 97 MMOL/L (ref 98–107)
CREAT SERPL-MCNC: 0.73 MG/DL (ref 0.51–0.95)
DEPRECATED HCO3 PLAS-SCNC: 26 MMOL/L (ref 22–29)
EGFRCR SERPLBLD CKD-EPI 2021: 89 ML/MIN/1.73M2
EOSINOPHIL # BLD AUTO: 0 10E3/UL (ref 0–0.7)
EOSINOPHIL NFR BLD AUTO: 0 %
ERYTHROCYTE [DISTWIDTH] IN BLOOD BY AUTOMATED COUNT: 12.8 % (ref 10–15)
FLUAV RNA SPEC QL NAA+PROBE: NEGATIVE
FLUBV RNA RESP QL NAA+PROBE: NEGATIVE
GLUCOSE SERPL-MCNC: 106 MG/DL (ref 70–99)
HCT VFR BLD AUTO: 43.2 % (ref 35–47)
HGB BLD-MCNC: 15.3 G/DL (ref 11.7–15.7)
HOLD SPECIMEN: NORMAL
IMM GRANULOCYTES # BLD: 0 10E3/UL
IMM GRANULOCYTES NFR BLD: 0 %
INR PPP: 1.29 (ref 0.85–1.15)
LIPASE SERPL-CCNC: 27 U/L (ref 13–60)
LYMPHOCYTES # BLD AUTO: 0.9 10E3/UL (ref 0.8–5.3)
LYMPHOCYTES NFR BLD AUTO: 9 %
MCH RBC QN AUTO: 32.2 PG (ref 26.5–33)
MCHC RBC AUTO-ENTMCNC: 35.4 G/DL (ref 31.5–36.5)
MCV RBC AUTO: 91 FL (ref 78–100)
MONOCYTES # BLD AUTO: 0.6 10E3/UL (ref 0–1.3)
MONOCYTES NFR BLD AUTO: 6 %
NEUTROPHILS # BLD AUTO: 8.8 10E3/UL (ref 1.6–8.3)
NEUTROPHILS NFR BLD AUTO: 85 %
NRBC # BLD AUTO: 0 10E3/UL
NRBC BLD AUTO-RTO: 0 /100
NT-PROBNP SERPL-MCNC: 138 PG/ML (ref 0–900)
PLATELET # BLD AUTO: 236 10E3/UL (ref 150–450)
POTASSIUM SERPL-SCNC: 3.9 MMOL/L (ref 3.4–5.3)
PROT SERPL-MCNC: 7.8 G/DL (ref 6.4–8.3)
RBC # BLD AUTO: 4.75 10E6/UL (ref 3.8–5.2)
RSV RNA SPEC NAA+PROBE: NEGATIVE
SARS-COV-2 RNA RESP QL NAA+PROBE: NEGATIVE
SODIUM SERPL-SCNC: 135 MMOL/L (ref 135–145)
TROPONIN T SERPL HS-MCNC: <6 NG/L
TROPONIN T SERPL HS-MCNC: <6 NG/L
WBC # BLD AUTO: 10.3 10E3/UL (ref 4–11)

## 2024-02-23 PROCEDURE — 99207 PR NO CHARGE LOS: CPT | Performed by: STUDENT IN AN ORGANIZED HEALTH CARE EDUCATION/TRAINING PROGRAM

## 2024-02-23 PROCEDURE — 250N000011 HC RX IP 250 OP 636: Performed by: PHYSICIAN ASSISTANT

## 2024-02-23 PROCEDURE — 71275 CT ANGIOGRAPHY CHEST: CPT | Mod: MG

## 2024-02-23 PROCEDURE — 83880 ASSAY OF NATRIURETIC PEPTIDE: CPT | Performed by: PHYSICIAN ASSISTANT

## 2024-02-23 PROCEDURE — 36415 COLL VENOUS BLD VENIPUNCTURE: CPT | Performed by: PHYSICIAN ASSISTANT

## 2024-02-23 PROCEDURE — 87637 SARSCOV2&INF A&B&RSV AMP PRB: CPT | Performed by: PHYSICIAN ASSISTANT

## 2024-02-23 PROCEDURE — 84484 ASSAY OF TROPONIN QUANT: CPT | Mod: 91 | Performed by: PHYSICIAN ASSISTANT

## 2024-02-23 PROCEDURE — 93010 ELECTROCARDIOGRAM REPORT: CPT | Performed by: INTERNAL MEDICINE

## 2024-02-23 PROCEDURE — 99285 EMERGENCY DEPT VISIT HI MDM: CPT | Mod: 25 | Performed by: PHYSICIAN ASSISTANT

## 2024-02-23 PROCEDURE — G0463 HOSPITAL OUTPT CLINIC VISIT: HCPCS

## 2024-02-23 PROCEDURE — 258N000003 HC RX IP 258 OP 636: Performed by: PHYSICIAN ASSISTANT

## 2024-02-23 PROCEDURE — 93005 ELECTROCARDIOGRAM TRACING: CPT | Performed by: PHYSICIAN ASSISTANT

## 2024-02-23 PROCEDURE — 99284 EMERGENCY DEPT VISIT MOD MDM: CPT | Performed by: PHYSICIAN ASSISTANT

## 2024-02-23 PROCEDURE — 96374 THER/PROPH/DIAG INJ IV PUSH: CPT | Mod: XU | Performed by: PHYSICIAN ASSISTANT

## 2024-02-23 PROCEDURE — 96361 HYDRATE IV INFUSION ADD-ON: CPT | Performed by: PHYSICIAN ASSISTANT

## 2024-02-23 PROCEDURE — 82374 ASSAY BLOOD CARBON DIOXIDE: CPT | Performed by: PHYSICIAN ASSISTANT

## 2024-02-23 PROCEDURE — 84450 TRANSFERASE (AST) (SGOT): CPT | Performed by: PHYSICIAN ASSISTANT

## 2024-02-23 PROCEDURE — 83690 ASSAY OF LIPASE: CPT | Performed by: PHYSICIAN ASSISTANT

## 2024-02-23 PROCEDURE — 85730 THROMBOPLASTIN TIME PARTIAL: CPT | Performed by: PHYSICIAN ASSISTANT

## 2024-02-23 PROCEDURE — 85610 PROTHROMBIN TIME: CPT | Performed by: PHYSICIAN ASSISTANT

## 2024-02-23 PROCEDURE — 250N000013 HC RX MED GY IP 250 OP 250 PS 637: Performed by: PHYSICIAN ASSISTANT

## 2024-02-23 PROCEDURE — 85025 COMPLETE CBC W/AUTO DIFF WBC: CPT | Performed by: PHYSICIAN ASSISTANT

## 2024-02-23 RX ORDER — MAGNESIUM HYDROXIDE/ALUMINUM HYDROXICE/SIMETHICONE 120; 1200; 1200 MG/30ML; MG/30ML; MG/30ML
15 SUSPENSION ORAL ONCE
Status: COMPLETED | OUTPATIENT
Start: 2024-02-23 | End: 2024-02-23

## 2024-02-23 RX ORDER — IOPAMIDOL 755 MG/ML
67 INJECTION, SOLUTION INTRAVASCULAR ONCE
Status: COMPLETED | OUTPATIENT
Start: 2024-02-23 | End: 2024-02-23

## 2024-02-23 RX ORDER — LORAZEPAM 2 MG/ML
0.5 INJECTION INTRAMUSCULAR ONCE
Status: COMPLETED | OUTPATIENT
Start: 2024-02-23 | End: 2024-02-23

## 2024-02-23 RX ADMIN — SODIUM CHLORIDE 1000 ML: 9 INJECTION, SOLUTION INTRAVENOUS at 13:12

## 2024-02-23 RX ADMIN — IOPAMIDOL 67 ML: 755 INJECTION, SOLUTION INTRAVENOUS at 13:23

## 2024-02-23 RX ADMIN — ALUMINUM HYDROXIDE, MAGNESIUM HYDROXIDE, AND SIMETHICONE 15 ML: 200; 200; 20 SUSPENSION ORAL at 15:26

## 2024-02-23 RX ADMIN — LORAZEPAM 0.5 MG: 2 INJECTION INTRAMUSCULAR; INTRAVENOUS at 13:12

## 2024-02-23 ASSESSMENT — ACTIVITIES OF DAILY LIVING (ADL)
ADLS_ACUITY_SCORE: 35

## 2024-02-23 ASSESSMENT — COLUMBIA-SUICIDE SEVERITY RATING SCALE - C-SSRS
1. IN THE PAST MONTH, HAVE YOU WISHED YOU WERE DEAD OR WISHED YOU COULD GO TO SLEEP AND NOT WAKE UP?: NO
2. HAVE YOU ACTUALLY HAD ANY THOUGHTS OF KILLING YOURSELF IN THE PAST MONTH?: NO
6. HAVE YOU EVER DONE ANYTHING, STARTED TO DO ANYTHING, OR PREPARED TO DO ANYTHING TO END YOUR LIFE?: NO

## 2024-02-23 ASSESSMENT — PAIN SCALES - GENERAL: PAINLEVEL: EXTREME PAIN (9)

## 2024-02-23 NOTE — ED TRIAGE NOTES
Pt presents to ED from  with chest pain. Pt reports increased chest pain with breathing. Pt took maalox with no relief.    Bessie Cunningham RN on 2/23/2024 at 12:16 PM       Triage Assessment (Adult)       Row Name 02/23/24 1215          Respiratory WDL    Respiratory WDL X  pain with breathing        Cardiac WDL    Cardiac WDL X;all        Chest Pain Assessment    Chest Pain Location midsternal     Chest Pain Radiation jaw;neck     Character heart burn     Precipitating Factors --  breathing

## 2024-02-23 NOTE — NURSING NOTE
"Chief Complaint   Patient presents with    Chest Pain     When she breaths in that started today   She has a lot of pain when she breaths in. This started this morning. She has taken liquid Maalox 3 times and it did not help.She did a Covid test yesterday and today and it was negative. She was exposed to Covid.   Emily Ortiz LPN..................2/23/2024   11:17 AM      Initial BP (!) 156/76   Pulse 115   Temp 100  F (37.8  C) (Temporal)   Resp 20   Wt 62.7 kg (138 lb 4.8 oz)   LMP  (LMP Unknown)   SpO2 99%   Breastfeeding No   BMI 23.74 kg/m   Estimated body mass index is 23.74 kg/m  as calculated from the following:    Height as of 1/11/24: 1.626 m (5' 4\").    Weight as of this encounter: 62.7 kg (138 lb 4.8 oz).  Medication Review: complete    The next two questions are to help us understand your food security.  If you are feeling you need any assistance in this area, we have resources available to support you today.          1/5/2024   SDOH- Food Insecurity   Within the past 12 months, did you worry that your food would run out before you got money to buy more? N   Within the past 12 months, did the food you bought just not last and you didn t have money to get more? N         Health Care Directive:  Patient does not have a Health Care Directive or Living Will: Discussed advance care planning with patient; however, patient declined at this time.    Emily Ortiz LPN      "

## 2024-02-23 NOTE — DISCHARGE INSTRUCTIONS
Get plenty of fluids and rest.  As discussed, all of your studies appear excellent for us today.  Is difficult to say what is causing your symptoms at this time however.  We discussed the possibility of reflux type disease.  We discussed that you are at moderate risk of a major adverse cardiac event in the near future.  We discussed obtaining further studies and observation today versus close management as an outpatient.  Shared decision-making was utilized and it was decided to continue as an outpatient.  I placed a referral for you to obtain an outpatient echocardiogram and follow-up with cardiology for reassessment.  Referrals also placed for you to follow-up with PCP to determine what they want to perform any further studies in case this is reflux disease that is occurring.  Will send you home on a medication called omeprazole to help with that process.  Return if there are worsening or concerning symptoms.

## 2024-02-23 NOTE — ED PROVIDER NOTES
History     Chief Complaint   Patient presents with    Chest Pain     HPI  Miguelina Estrada is a 68 year old female who presents to the ED for evaluation of chest pain. Pt presents to ED from  with chest pain. Pt reports increased chest pain with breathing. Pt took maalox with no relief.     Allergies:  Allergies   Allergen Reactions    Clindamycin Difficulty breathing    Erythromycin Nausea and Vomiting    Levofloxacin Other (See Comments) and Palpitations    Penicillins Rash    Sulfa Antibiotics Rash       Problem List:    Patient Active Problem List    Diagnosis Date Noted    Hiatal hernia 08/04/2020     Priority: Medium    Nodular goiter, non-toxic 06/18/2019     Priority: Medium    Paroxysmal atrial fibrillation (H) 05/18/2018     Priority: Medium    Ophthalmic herpes zoster 09/14/2017     Priority: Medium    Chronic GERD 09/02/2016     Priority: Medium    Rosacea 12/07/2015     Priority: Medium    Premature menopause on HRT 05/31/2013     Priority: Medium    Adie's tonic pupil, 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 Barbara Goss    ENDOSCOPY UPPER, COLONOSCOPY, COMBINED      8/11/2017    ESOPHAGOSCOPY, GASTROSCOPY, DUODENOSCOPY (EGD), COMBINED      2009    HYSTERECTOMY TOTAL ABDOMINAL, BILATERAL SALPINGO-OOPHORECTOMY, COMBINED      2003    LAPAROSCOPIC CHOLECYSTECTOMY      2007    LAPAROSCOPIC TUBAL LIGATION      1980s       Family History:    Family History   Problem Relation Age of Onset    Hypertension Mother         Hypertension    Heart Disease Mother 86        Heart Disease,silent MI,  "CHF    Coronary Artery Disease Mother 86        MI  Afib    Arrhythmia Mother     Diabetes Father 60        Diabetes    Other - See Comments Father 55        Atrial fibrillation    Arrhythmia Father     Hypertension Brother         Hypertension    Breast Cancer No family hx of         Cancer-breast       Social History:  Marital Status:   [2]  Social History     Tobacco Use    Smoking status: Never     Passive exposure: Current    Smokeless tobacco: Never   Vaping Use    Vaping Use: Never used   Substance Use Topics    Alcohol use: No     Alcohol/week: 0.0 standard drinks of alcohol    Drug use: No        Medications:    omeprazole (PRILOSEC) 20 MG DR capsule  acyclovir (ZOVIRAX) 400 MG tablet  doxycycline hyclate (VIBRAMYCIN) 50 MG capsule  doxycycline monohydrate (MONODOX) 50 MG capsule  estrogens-methylTESTOSTERone (ESTRATEST) 1.25-2.5 MG per tablet  Lysine HCl 1000 MG TABS  metroNIDAZOLE (METROGEL) 0.75 % external gel  rivaroxaban ANTICOAGULANT (XARELTO ANTICOAGULANT) 20 MG TABS tablet          Review of Systems   Constitutional:  Negative for chills and fever.   HENT:  Negative for congestion.    Eyes:  Negative for visual disturbance.   Respiratory:  Negative for cough and shortness of breath.    Cardiovascular:  Positive for chest pain.        Chest pain radiates up to neck   Gastrointestinal:  Negative for abdominal pain.   Genitourinary:  Negative for hematuria.   Allergic/Immunologic: Negative for immunocompromised state.   Neurological:  Negative for syncope.       Physical Exam   BP: (!) 152/77  Pulse: 110  Temp: 99.6  F (37.6  C)  Resp: 24  Height: 162.6 cm (5' 4\")  Weight: 62.6 kg (138 lb)  SpO2: 98 %      Physical Exam  Constitutional:       General: She is not in acute distress.     Appearance: She is well-developed. She is not diaphoretic.   HENT:      Head: Normocephalic and atraumatic.   Eyes:      General: No scleral icterus.     Conjunctiva/sclera: Conjunctivae normal.   Cardiovascular: "      Rate and Rhythm: Normal rate and regular rhythm.   Pulmonary:      Effort: Pulmonary effort is normal.      Breath sounds: Normal breath sounds.   Abdominal:      Palpations: Abdomen is soft.      Tenderness: There is no abdominal tenderness.   Musculoskeletal:         General: No deformity.      Cervical back: Neck supple.   Lymphadenopathy:      Cervical: No cervical adenopathy.   Skin:     General: Skin is warm and dry.      Coloration: Skin is not jaundiced.      Findings: No rash.   Neurological:      General: No focal deficit present.      Mental Status: She is alert. Mental status is at baseline.   Psychiatric:         Mood and Affect: Mood is anxious.         Behavior: Behavior normal.         ED Course                 Procedures         EKG read at 1216. , sinus tachycardia with occasional PAC, no ST changes.     Critical Care time:  none               Results for orders placed or performed during the hospital encounter of 02/23/24 (from the past 24 hour(s))   CBC with platelets differential    Narrative    The following orders were created for panel order CBC with platelets differential.  Procedure                               Abnormality         Status                     ---------                               -----------         ------                     CBC with platelets and d...[[id number]]  Abnormal            Final result                 Please view results for these tests on the individual orders.   Basic metabolic panel   Result Value Ref Range    Sodium 135 135 - 145 mmol/L    Potassium 3.9 3.4 - 5.3 mmol/L    Chloride 97 (L) 98 - 107 mmol/L    Carbon Dioxide (CO2) 26 22 - 29 mmol/L    Anion Gap 12 7 - 15 mmol/L    Urea Nitrogen 8.7 8.0 - 23.0 mg/dL    Creatinine 0.73 0.51 - 0.95 mg/dL    GFR Estimate 89 >60 mL/min/1.73m2    Calcium 9.9 8.8 - 10.2 mg/dL    Glucose 106 (H) 70 - 99 mg/dL   Troponin T, High Sensitivity   Result Value Ref Range    Troponin T, High Sensitivity <6 <=14  ng/L   INR   Result Value Ref Range    INR 1.29 (H) 0.85 - 1.15   Partial thromboplastin time   Result Value Ref Range    aPTT 35 22 - 38 Seconds   Hepatic panel   Result Value Ref Range    Protein Total 7.8 6.4 - 8.3 g/dL    Albumin 4.8 3.5 - 5.2 g/dL    Bilirubin Total 0.7 <=1.2 mg/dL    Alkaline Phosphatase 78 40 - 150 U/L    AST 27 0 - 45 U/L    ALT 21 0 - 50 U/L    Bilirubin Direct <0.20 0.00 - 0.30 mg/dL   Nt probnp inpatient (BNP)   Result Value Ref Range    N terminal Pro BNP Inpatient 138 0 - 900 pg/mL   [place] Draw    Narrative    The following orders were created for panel order [place] Draw.  Procedure                               Abnormality         Status                     ---------                               -----------         ------                     Extra Red Top Tube[[id number]]                               Final result                 Please view results for these tests on the individual orders.   CBC with platelets and differential   Result Value Ref Range    WBC Count 10.3 4.0 - 11.0 10e3/uL    RBC Count 4.75 3.80 - 5.20 10e6/uL    Hemoglobin 15.3 11.7 - 15.7 g/dL    Hematocrit 43.2 35.0 - 47.0 %    MCV 91 78 - 100 fL    MCH 32.2 26.5 - 33.0 pg    MCHC 35.4 31.5 - 36.5 g/dL    RDW 12.8 10.0 - 15.0 %    Platelet Count 236 150 - 450 10e3/uL    % Neutrophils 85 %    % Lymphocytes 9 %    % Monocytes 6 %    % Eosinophils 0 %    % Basophils 0 %    % Immature Granulocytes 0 %    NRBCs per 100 WBC 0 <1 /100    Absolute Neutrophils 8.8 (H) 1.6 - 8.3 10e3/uL    Absolute Lymphocytes 0.9 0.8 - 5.3 10e3/uL    Absolute Monocytes 0.6 0.0 - 1.3 10e3/uL    Absolute Eosinophils 0.0 0.0 - 0.7 10e3/uL    Absolute Basophils 0.0 0.0 - 0.2 10e3/uL    Absolute Immature Granulocytes 0.0 <=0.4 10e3/uL    Absolute NRBCs 0.0 10e3/uL   Extra Red Top Tube   Result Value Ref Range    Hold Specimen JI    Lipase   Result Value Ref Range    Lipase 27 13 - 60 U/L   CT Chest Pulmonary Embolism w Contrast    Narrative     PROCEDURE:  CT CHEST PULMONARY EMBOLISM W CONTRAST.    HISTORY:  Evaluate for pulmonary embolism.  central chest pain,  travels up to jaw. Aorta? lungs? heart? PE?    TECHNIQUE:  Initial pre-contrast  and localizer images were  obtained.  Contrast enhanced helical CT pulmonary angiography was then  performed.  Routine transaxial and post-processed (multiplanar and/or  MIP) reformations were obtained. This CT exam was performed using one  or more the following dose reduction techniques: automated exposure  control, adjustment of the mA and/or kV according to patient size,  and/or iterative reconstruction technique.    COMPARISON:  None.    MEDS/CONTRAST: 67 ml isovue 370    PULMONARY CTA FINDINGS:  This is a diagnostic quality helical CT  pulmonary angiogram.  There is no acute pulmonary embolism to the  first subsegmental pulmonary artery level. Allowing for artifact  related to nongated technique, no acute aortic injury is identified.    OTHER FINDINGS:      The neck base is grossly symmetric. Cardiac size is normal. There is  no pericardial or pleural effusion. The thoracic aorta and main  pulmonary artery are within normal limits in size. No abnormal  thoracic adenopathy is identified.    Limited views of the upper abdomen reveal no adrenal mass or acute  process.     The pleura is without thickening or effusions. The central airways are  unremarkable. No focal consolidation is identified. Mild centrilobular  emphysema is seen. There is trace bibasilar atelectasis likely related  to the phase of respiration.    No suspicious osseous lesion is identified.      Impression    IMPRESSION:    No acute pulmonary emboli to the subsegmental level.    AUSTIN PILLAI MD         SYSTEM ID:  [id number]   Troponin T, High Sensitivity   Result Value Ref Range    Troponin T, High Sensitivity <6 <=14 ng/L   Symptomatic Influenza A/B, RSV, & SARS-CoV2 PCR (COVID-19) Nose    Specimen: Nose; Swab   Result Value Ref Range     Influenza A PCR Negative Negative    Influenza B PCR Negative Negative    RSV PCR Negative Negative    SARS CoV2 PCR Negative Negative    Narrative    Testing was performed using the Xpert Xpress CoV2/Flu/RSV Assay on the [employer] GeneXpert Instrument. This test should be ordered for the detection of SARS-CoV-2, influenza, and RSV viruses in individuals who meet clinical and/or epidemiological criteria. Test performance is unknown in asymptomatic patients. This test is for in vitro diagnostic use under the FDA EUA for laboratories certified under CLIA to perform high or moderate complexity testing. This test has not been FDA cleared or approved. A negative result does not rule out the presence of PCR inhibitors in the specimen or target RNA in concentration below the limit of detection for the assay. If only one viral target is positive but coinfection with multiple targets is suspected, the sample should be re-tested with another FDA cleared, approved, or authorized test, if coinfection would change clinical management. This test was validated by the [facility] [employer] [facility]. These laboratories are certified under the Clinical Laboratory Improvement Amendments of 1988 (CLIA-88) as qualified to perform high complexity laboratory testing.       Medications   LORazepam (ATIVAN) injection 0.5 mg (0.5 mg Intravenous $Given 2/23/24 1312)   sodium chloride 0.9% BOLUS 1,000 mL (0 mLs Intravenous Stopped 2/23/24 1537)   iopamidol (ISOVUE-370) solution 67 mL (67 mLs Intravenous $Given 2/23/24 1323)   alum & mag hydroxide-simethicone (MAALOX) suspension 15 mL (15 mLs Oral $Given 2/23/24 1526)       Assessments & Plan (with Medical Decision Making)   Pt nontoxic in NAD but does appear anxious. Heart, lung, bowel sounds normal, abd soft, nontender to palpation, nondistended. VSS, afebrile.    Patient has very reassuring lab work with no leukocytosis, normal hemoglobin, normal BMP, 2 negative nonchanging troponins, normal  lipase and viral panel.    CT PE study:  No acute pulmonary emboli to the subsegmental level.     She had been given a dose of Ativan, she reports feeling much improved.  We discussed our findings.  Is difficult to say what is causing her symptoms at this time.  She reports a long history of GERD, that may be occurring today.  I discussed with her that she is at moderate risk of major adverse cardiac event in the near future.  We discussed further observation with repeat troponin studies versus close monitoring as an outpatient.  She would like to go home at this time.  Will send her home on some omeprazole and referral was placed to follow-up and obtain echocardiogram and then see cardiology.  I also recommend she get in with her PCP and referrals placed for that as well.  They can determine whether any further scopes or studies are needed.     Strict return precautions are given to the pt, they will return if symptoms are worsening or concerning. The pt understands and agrees with the plan and they are discharged.     Praveen Land PA-C      I have reviewed the nursing notes.    I have reviewed the findings, diagnosis, plan and need for follow up with the patient.          Discharge Medication List as of 2/23/2024  3:37 PM        START taking these medications    Details   omeprazole (PRILOSEC) 20 MG DR capsule Take 1 capsule (20 mg) by mouth daily for 30 days, Disp-30 capsule, R-0, E-Prescribe             Final diagnoses:   Chest pain   Gastroesophageal reflux disease       2/23/2024   [facility] AND Praveen Joel PA  02/24/24 [id number]

## 2024-02-23 NOTE — PROGRESS NOTES

## 2024-02-23 NOTE — PROGRESS NOTES
"Triaged from: [facility]    DIAGNOSIS:   1. Chest discomfort    2. Acute cough      Patient presents today with history of chest burning and discomfort.  States it goes up into her jawline.  Worse with inspiration as well as expiration.  She did wake up from sleep due to this pain.  She did take Maalox, this was not helpful for her.    She does have A-fib, does take Xarelto.      Initial BP (!) 156/76   Pulse 115   Temp 100  F (37.8  C) (Temporal)   Resp 20   Wt 62.7 kg (138 lb 4.8 oz)   LMP  (LMP Unknown)   SpO2 99%   Breastfeeding No   BMI 23.74 kg/m   Estimated body mass index is 23.74 kg/m  as calculated from the following:    Height as of 1/11/24: 1.626 m (5' 4\").    Weight as of this encounter: 62.7 kg (138 lb 4.8 oz).    Spoke with staff at the emergency room, no EKG completed but she was immediately transferred to the emergency room for higher level of care.  Patient is agreeable to this plan.    Asha Jeff PA-C                "

## 2024-02-24 LAB
ATRIAL RATE - MUSE: 110 BPM
DIASTOLIC BLOOD PRESSURE - MUSE: NORMAL MMHG
INTERPRETATION ECG - MUSE: NORMAL
P AXIS - MUSE: 80 DEGREES
PR INTERVAL - MUSE: 162 MS
QRS DURATION - MUSE: 74 MS
QT - MUSE: 334 MS
QTC - MUSE: 452 MS
R AXIS - MUSE: 64 DEGREES
SYSTOLIC BLOOD PRESSURE - MUSE: NORMAL MMHG
T AXIS - MUSE: 55 DEGREES
VENTRICULAR RATE- MUSE: 110 BPM

## 2024-02-24 ASSESSMENT — ENCOUNTER SYMPTOMS
FEVER: 0
ABDOMINAL PAIN: 0
HEMATURIA: 0
SHORTNESS OF BREATH: 0
CHILLS: 0
COUGH: 0

## 2024-02-26 NOTE — PROGRESS NOTES
"Service Date: 2024    Fady Oconnor MD  [address] [place] [address]     RE:  Miguelina Estrada   MRN:  [id number]   :  1955       Dear Dr. Oconnor:    It was a pleasure participating in the care of your patient, Miguelina Estrada .  As you know, she is a 68 year old year old person who I met over a virtual visit today for chest pain, blood pressure control, lipids and paroxysmal atrial arrhythmias.    Past medical history is significant for the followin.  Goiter  2.  Herpes zoster  3.  Hiatal hernia with gastro esophageal reflux disease  4.  Premature menopause on hormone replacement therapy  5.   Adie tonic pupil     Patient's cardiac history significant for having had a Zio patch monitor performed 2018 that was reported as revealing 335 short episodes of a supraventricular atrial arrhythmia longest episode 15 beats.  The rhythm was reported as possible A-fib however it is most likely paroxysmal atrial tachycardia.    She is currently maintained on rivaroxaban 20 mg a day    Patient's [id number] 2 score is    The patient presented to the emergency department on 2024 with chest discomfort.  Workup at that time was negative which included an EKG, troponin, pro and BNP, CT for pulmonary embolus.    From a clinical standpoint, she says that she awoke on Friday morning at 4 AM with a \"pleurisy\" type of sensation where it hurts to take a deep breath or even exhale.  She tried to do her chores that day but felt the discomfort continue such that she could not take a deep breath in.  It was only after she was given Ativan in the emergency department that she was able to relax enough for the discomfort to resolve by itself.  The discomfort was quite positional in nature and felt better and a half sitting up position and sitting very still.    She has not had any further episodes since being home and has not had any previous episodes.  From a functional standpoint she is " "living an active lifestyle climbing stairs multiple times a day in her home, taking care of her dad at his house, and hauling and carrying firewood on a frequent basis as well without gross exertional limitation or symptoms.    She also has ongoing reflux disease described as a different type of left rib cage ball or knot in that part of her epigastrium which feels like a burp that she cannot burp out.  Is typically made better with medicines and feels quite different than this other \"pleurisy\" type of sensation that she felt the other night.  Her reflux is associated with foods and may be exacerbated by spicy foods as well.    The patient otherwise denies gross chest pain, shortness of breath, PND, orthopnea, edema, palpitations, syncope or near syncope.    10 Point Review of Systems: Positive for a low-grade fever of 99.6 in the emergency department according to her    MEDICATIONS:    1.  Rivaroxaban 20 mg a day  2.  Omeprazole  3.  Estrogen  4.  Acyclovir    PHYSICAL EXAM:    1/11/2024: Blood pressure 128/62, pulse 67, weight 137 pounds  General:  Patient appears comfortable, well groomed  Psych:  Patient is alert and oriented X 3  Eyes:  No gross erythema, exudate  Respiratory:  Patient is breathing comfortably without gross cough    The remainder of the comprehensive physical exam was deferred secondary to the COVID-19 pandemic and secondary to virtual visit restrictions.    LABS:    1/7/2022: LDL of 142  2/23/2024: Potassium 3.9, GFR normal, hemoglobin normal, NT proBNP 138, troponin negative  10/20/2023 TSH normal    EKG 2/23/2024 reveals sinus tachycardia with isolated PVC versus PAC with aberrancy    Carotid ultrasound 6/6/2019 reveals no segment stenoses    10/23/2024 MRA neck reveals no significant stenoses    CT for pulmonary embolus 2/23/2024 was negative for gross pathology    IMPRESSION:    Miguelina is a 68-year-old lady whose past medical history significant for thyroid issues presents with several " "active issues:    1.  Chest pain of unclear etiology    By history, she suffered a single episode of \"pleuritic\" type of chest discomfort, in which it was painful to take a deep breath in or even exhale for multiple hours.  Chest CT for pulmonary embolus 2/23/2024 was negative for pulmonary embolus or other structural pathology.  The discomfort was positional in nature and was slightly better and a half sitting position sitting very still.    The only relief was achieved after she received Ativan in the emergency department.    This pleuritic type of discomfort would not likely be secondary to an underlying ischemic or coronary type of etiology, but rather may be more musculoskeletal in nature, and could even be consistent with some form of intercostal muscle spasm in the context of her negative CT for pulmonary embolus that night.    Further clinical monitoring and noninvasive evaluation would currently be indicated.    2.  Blood pressure control    Blood pressures currently running 128/62, continue to follow    3.  Lipids    Total cholesterol 195 back in 2022, she is due for fasting lipid profile.    4.  Paroxysmal A-fib    The patient's Zio patch monitor 4/24/2018 reports 335 short episodes of supraventricular arrhythmias longest lasting 15 beats.  The rhythm was reported as possible atrial fibrillation however upon review of the actual rhythm strips it was likely most representative of paroxysmal atrial tachycardia.    She is currently maintained on rivaroxaban 20 mg a day for [id number] 2 score of 2 for age and gender.    From a clinical standpoint, she says she feels occasional palpitations and can feel her \"A-fib\", but it is only noticeable and not symptom limiting.    PLAN:    1.  I would like to review her final echocardiogram report, if unremarkable then no further workup would currently be indicated.    If she has future episodes or if her symptoms sound more cardiac in nature, we will consider further " "workup and testing at that time.    Once again, it was a pleasure participating in the care of your patient, Miguelina Estrada .  Please feel free to contact me at any time if there are any questions regarding her care in the future.      Sincerely,          Kip Dennis M.D.  Cardiovascular Division  [facility]      The patient has been notified of following:     \"This video visit will be conducted via a call between you and your physician/provider. We have found that certain health care needs can be provided without the need for an in-person physical exam.  This service lets us provide the care you need with a video conversation.  If a prescription is necessary we can send it directly to your pharmacy.  If lab work is needed we can place an order for that and you can then stop by our lab to have the test done at a later time.    Video visits are billed at different rates depending on your insurance coverage.  Please reach out to your insurance provider with any questions.    If during the course of the call the physician/provider feels a video visit is not appropriate, you will not be charged for this service.\"    Patient has given verbal consent for video visit? Yes    How would you like to obtain your AVS? Mail    Video-Visit Details    Type of service:  Video Visit    Video Start Time:901am    Video End Time:921am    Total visit time including video visit, chart review, charting, coordination of care =56min    Originating Location (pt. Location):patient home      Distant Location (provider location):   Off site home office    Platform used for Video Visit: Ana RIVERS#:[phone]         "

## 2024-02-27 ENCOUNTER — VIRTUAL VISIT (OUTPATIENT)
Dept: CARDIOLOGY | Facility: OTHER | Age: 69
End: 2024-02-27
Attending: INTERNAL MEDICINE
Payer: COMMERCIAL

## 2024-02-27 VITALS — WEIGHT: 135 LBS | HEIGHT: 64 IN | BODY MASS INDEX: 23.05 KG/M2

## 2024-02-27 DIAGNOSIS — R07.9 CHEST PAIN, UNSPECIFIED TYPE: ICD-10-CM

## 2024-02-27 DIAGNOSIS — K21.9 GASTROESOPHAGEAL REFLUX DISEASE, UNSPECIFIED WHETHER ESOPHAGITIS PRESENT: ICD-10-CM

## 2024-02-27 PROCEDURE — 99204 OFFICE O/P NEW MOD 45 MIN: CPT | Mod: 95 | Performed by: INTERNAL MEDICINE

## 2024-02-27 ASSESSMENT — PAIN SCALES - GENERAL: PAINLEVEL: MILD PAIN (3)

## 2024-02-27 NOTE — PATIENT INSTRUCTIONS
Agree with echo already ordered by another provider, when report becomes available, please send copy to my inbox  Followup TBD pending test results

## 2024-02-27 NOTE — NURSING NOTE
Patient confirms medications and allergies are accurate via patients echeck in completion, and or denies any changes since last reviewed/verified.     Is the patient currently in the state of [place]? YES    Visit mode:VIDEO    If the visit is dropped, the patient can be reconnected by: TELEPHONE VISIT: Phone number:   Telephone Information:   Mobile [phone]       Will anyone else be joining the visit? /Jessee   (If patient encounters technical issues they should call [phone])    How would you like to obtain your AVS? MyChart    Are changes needed to the allergy or medication list? No    Reason for visit: Consult    Emily ZARAGOZAF

## 2024-03-01 ENCOUNTER — HOSPITAL ENCOUNTER (OUTPATIENT)
Dept: CARDIOLOGY | Facility: OTHER | Age: 69
Discharge: HOME | End: 2024-03-01
Attending: PHYSICIAN ASSISTANT | Admitting: PHYSICIAN ASSISTANT
Payer: MEDICARE

## 2024-03-01 DIAGNOSIS — R07.9 CHEST PAIN: ICD-10-CM

## 2024-03-01 DIAGNOSIS — K21.9 GASTROESOPHAGEAL REFLUX DISEASE: ICD-10-CM

## 2024-03-01 LAB — LVEF ECHO: NORMAL

## 2024-03-01 PROCEDURE — 93306 TTE W/DOPPLER COMPLETE: CPT

## 2024-03-01 PROCEDURE — 93306 TTE W/DOPPLER COMPLETE: CPT | Mod: 26 | Performed by: INTERNAL MEDICINE

## 2024-03-04 ENCOUNTER — OFFICE VISIT (OUTPATIENT)
Dept: FAMILY MEDICINE | Facility: OTHER | Age: 69
End: 2024-03-04
Payer: COMMERCIAL

## 2024-03-04 VITALS
DIASTOLIC BLOOD PRESSURE: 80 MMHG | SYSTOLIC BLOOD PRESSURE: 138 MMHG | WEIGHT: 140 LBS | HEART RATE: 68 BPM | OXYGEN SATURATION: 99 % | HEIGHT: 64 IN | TEMPERATURE: 97.7 F | BODY MASS INDEX: 23.9 KG/M2 | RESPIRATION RATE: 16 BRPM

## 2024-03-04 DIAGNOSIS — K21.00 GASTROESOPHAGEAL REFLUX DISEASE WITH ESOPHAGITIS WITHOUT HEMORRHAGE: Primary | ICD-10-CM

## 2024-03-04 DIAGNOSIS — L71.9 ROSACEA: ICD-10-CM

## 2024-03-04 DIAGNOSIS — I48.0 PAROXYSMAL ATRIAL FIBRILLATION (H): ICD-10-CM

## 2024-03-04 PROCEDURE — G0463 HOSPITAL OUTPT CLINIC VISIT: HCPCS | Mod: 25

## 2024-03-04 PROCEDURE — G0463 HOSPITAL OUTPT CLINIC VISIT: HCPCS

## 2024-03-04 PROCEDURE — 99214 OFFICE O/P EST MOD 30 MIN: CPT | Performed by: PHYSICIAN ASSISTANT

## 2024-03-04 RX ORDER — METRONIDAZOLE 7.5 MG/G
GEL TOPICAL 2 TIMES DAILY
Qty: 45 G | Refills: 3 | Status: SHIPPED | OUTPATIENT
Start: 2024-03-04

## 2024-03-04 ASSESSMENT — PAIN SCALES - GENERAL: PAINLEVEL: NO PAIN (0)

## 2024-03-04 NOTE — PROGRESS NOTES
Assessment & Plan       ICD-10-CM    1. Gastroesophageal reflux disease with esophagitis without hemorrhage  K21.00 omeprazole (PRILOSEC) 20 MG DR capsule      2. Rosacea  L71.9 metroNIDAZOLE (METROGEL) 0.75 % external gel      3. Paroxysmal atrial fibrillation (H)  I48.0         GERD, much improved with re-addition of Prilosec. Discussed medication dosing, will continue 20 mg daily, with ability to increase to 40 mg (2 tablets) on flare up days. Recommend to limit acidic, spicy, greasy foods, as well as NSAIDs. May sleep with head of bed elevated to promote acid drainage, etc. Return precautions reviewed.   Rosacea - new script for Metrogel sent to pharmacy as current script is . As well, recommend to restart Doxycycline. Avoid taking Doxycycline at same time as dairy consumption and recommend to avoid taking multivitamin as may impair absorption.   Paroxysmal Atrial Fibrillation, chronic, ongoing.  Continues with rivaroxaban (XARELTO) for oral anticoagulation.  Not rate control medication (Beta blocker/Calcium channel blocker). Tolerating well.  Denies excessive bruising or bleeding issues.  Medication list reviewed/updated. Refills completed as needed.    See Patient Instructions    Return if symptoms worsen or fail to improve.    Bryon Toro is a 68 year old, presenting for the following health issues:  Clinic Care Coordination - Follow-up (ER)        3/4/2024    12:21 PM   Additional Questions   Roomed by brandy penaloza lpn     History of Present Illness       Reason for visit:  Was in emergency on  with  chest pain that would not go away.    She eats 2-3 servings of fruits and vegetables daily.She consumes 1 sweetened beverage(s) daily.She exercises with enough effort to increase her heart rate 10 to 19 minutes per day.  She exercises with enough effort to increase her heart rate 3 or less days per week.   She is taking medications regularly.     ED/UC Followup:    Facility:  [facility]  Date of visit:  "24  Reason for visit: Chest pain  Current Status: improved.     ER 24 - chest pain felt like \"pleurisy on steroids\" that radiated from chest into lower midline jaw. Trialed Maalox x3 for GERD, no relief, prompting ER visit. Elevated INR, otherwise, normal lab work. CTA negative. Viral multiplex negative. Ativan helpful. Cardiology referral placed.     Cardiology 24 - symptoms thought to be due to pleuritic chest pain/muscular etiology. Recommended ECHO.     ECHO 3/1/24 - LVEF 60-65%. RV normal. Mild mitral insufficiency and trace tricuspid insufficiency. No effusion.     Cormorbid conditions - Atrial fibrillation (anticoagulated with Xarelto 20 mg daily), Rosacea (Doxycycline - off since  due to GI side effects and Metrogel  2 years ago - needs new script), GERD (Omeprazole 20 mg daily). GERD, history of use of Omeprazole (trialed Protonix in the past as well) - has been back on Prilosec for the last week or so, improved.         Review of Systems  Constitutional, HEENT, cardiovascular, pulmonary, GI, , musculoskeletal, neuro, skin, endocrine and psych systems are negative, except as otherwise noted.        Objective    /80 (BP Location: Right arm, Patient Position: Sitting, Cuff Size: Adult Regular)   Pulse 68   Temp 97.7  F (36.5  C) (Tympanic)   Resp 16   Ht 1.626 m (5' 4\")   Wt 63.5 kg (140 lb)   LMP  (LMP Unknown)   SpO2 99%   BMI 24.03 kg/m    Body mass index is 24.03 kg/m .  Physical Exam   GENERAL: alert and no distress  EYES: Eyes grossly normal to inspection, PERRL and conjunctivae and sclerae normal  HENT: ear canals and TM's normal, nose and mouth without ulcers or lesions  NECK: no adenopathy, no asymmetry, masses, or scars  RESP: lungs clear to auscultation - no rales, rhonchi or wheezes  CV: regular rate and rhythm, normal S1 S2, no S3 or S4, no murmur, click or rub, no peripheral edema  ABDOMEN: soft, nontender, no hepatosplenomegaly, no masses and " bowel sounds normal  MS: no gross musculoskeletal defects noted, no edema  SKIN: rosacea - primarily periorbital and forehead. No open lesions. No signs of infection.   PSYCH: mentation appears normal, affect normal/bright  LYMPH: normal ant/post cervical, supraclavicular nodes    Echocardiogram Complete    Result Date: 3/1/2024  [id number]^[id number]^BEATA  [facility] [address] [place] [address]  Name: SARITA DECKER MRN: [id number] : 1955 Study Date: 2024 08:33 AM Age: 68 yrs Gender: Female Patient Location: [facility] Reason For Study: Gastroesophageal reflux disease, Chest pain Ordering Physician: BEATA ARAGON Referring Physician: BEATA ARAGON Performed By: Opal Vaughn, ERNESTINA, RDCS, RVT  BSA: 1.7 m2 Height: 64 in Weight: 135 lb HR: 75 BP: 129/68 mmHg ______________________________________________________________________________ Procedure Echocardiogram with two-dimensional, color and spectral Doppler performed. ______________________________________________________________________________ Interpretation Summary Left ventricular size, wall motion and function are normal. The ejection fraction is 60-65%. Right ventricular function, chamber size, wall motion, and thickness are normal. No significant valve abnormalities. The inferior vena cava is normal.  This study was compared with the study from 19. No change in ventricular function. ______________________________________________________________________________ Left Ventricle Left ventricular size, wall motion and function are normal. The ejection fraction is 60-65%. Left ventricular diastolic function is normal.  Right Ventricle Right ventricular function, chamber size, wall motion, and thickness are normal.  Atria Both atria appear normal.  Mitral Valve The mitral valve is normal. Mild mitral insufficiency is present.  Aortic Valve Aortic valve is normal in  structure and function. The aortic valve is tricuspid.  Tricuspid Valve The tricuspid valve is normal. Trace tricuspid insufficiency is present. Estimated pulmonary artery systolic pressure is 26 mmHg plus right atrial pressure. Pulmonary artery systolic pressure is normal.  Pulmonic Valve The pulmonic valve is normal.  Vessels The aorta root is normal. The thoracic aorta cannot be assessed. The inferior vena cava is normal.  Pericardium No pericardial effusion is present.  Compared to Previous Study This study was compared with the study from 19 .  ______________________________________________________________________________ MMode/2D Measurements & Calculations IVSd: 0.87 cm LVIDd: 3.7 cm LVIDs: 2.6 cm LVPWd: 0.81 cm FS: 29.0 % LV mass(C)d: 89.1 grams LV mass(C)dI: 53.8 grams/m2  Ao root diam: 3.1 cm asc Aorta Diam: 3.3 cm LVOT diam: 2.2 cm LVOT area: 3.8 cm2 Ao root diam index Ht(cm/m): 1.9 Ao root diam index BSA (cm/m2): 1.8 Asc Ao diam index BSA (cm/m2): 2.0 Asc Ao diam index Ht(cm/m): 2.0 LA Volume (BP): 34.7 ml LA Volume Index (BP): 20.9 ml/m2 RWT: 0.43  TAPSE: 2.0 cm  Doppler Measurements & Calculations MV E max jovany: 94.9 cm/sec MV A max jovany: 97.9 cm/sec MV E/A: 0.97 MV dec time: 0.21 sec Ao V2 max: 98.9 cm/sec Ao max P.0 mmHg Ao V2 mean: 66.4 cm/sec Ao mean P.0 mmHg Ao V2 VTI: 21.0 cm ROSENDO(I,D): 3.7 cm2 ROSENDO(V,D): 3.8 cm2 LV V1 max PG: 3.9 mmHg LV V1 max: 98.5 cm/sec LV V1 VTI: 20.4 cm SV(LVOT): 78.2 ml SI(LVOT): 47.2 ml/m2 TR max jovany: 230.6 cm/sec TR max P.4 mmHg AV Jovany Ratio (DI): 1.00 ROSENDO Index (cm2/m2): 2.2 E/E' av.9 Lateral E/e': 9.8 Medial E/e': 10.0 RV S Jovany: 13.1 cm/sec  ______________________________________________________________________________ Report approved by: Emmy Paredes 2024 11:40 AM        Signed Electronically by: Dorota Blevins PA-C

## 2024-03-04 NOTE — NURSING NOTE
"Patient presents to the clinic for ER follow up.    FOOD SECURITY SCREENING QUESTIONS:    The next two questions are to help us understand your food security.  If you are feeling you need any assistance in this area, we have resources available to support you today.    Hunger Vital Signs:  Within the past 12 months we worried whether our food would run out before we got money to buy more. Never  Within the past 12 months the food we bought just didn't last and we didn't have money to get more. Never    Advance Care Directive on file? no  Advance Care Directive provided to patient? Declined.      Chief Complaint   Patient presents with    Clinic Care Coordination - Follow-up     ER       Initial /80 (BP Location: Right arm, Patient Position: Sitting, Cuff Size: Adult Regular)   Pulse 68   Temp 97.7  F (36.5  C) (Tympanic)   Resp 16   Ht 1.626 m (5' 4\")   Wt 63.5 kg (140 lb)   LMP  (LMP Unknown)   SpO2 99%   BMI 24.03 kg/m   Estimated body mass index is 24.03 kg/m  as calculated from the following:    Height as of this encounter: 1.626 m (5' 4\").    Weight as of this encounter: 63.5 kg (140 lb).  Medication Reconciliation: complete        Genesis Soares LPN'     "

## 2024-05-16 DIAGNOSIS — B02.30 OPHTHALMIC HERPES ZOSTER: ICD-10-CM

## 2024-05-21 RX ORDER — ACYCLOVIR 400 MG/1
400 TABLET ORAL 2 TIMES DAILY
Qty: 180 TABLET | Refills: 0 | Status: SHIPPED | OUTPATIENT
Start: 2024-05-21 | End: 2024-08-14

## 2024-05-21 NOTE — TELEPHONE ENCOUNTER
[id number] of [place] sent Rx request for the following:      Requested Prescriptions   Pending Prescriptions Disp Refills    acyclovir (ZOVIRAX) 400 MG tablet 180 tablet 0     Sig: Take 1 tablet (400 mg) by mouth 2 times daily       Antivirals Passed - 5/21/2024 12:05 PM       Last Prescription Date:   1/16/24  Last Fill Qty/Refills:         180, R-0    Last Office Visit:              3/4/24   Future Office visit:           none    Prescription approved per [facility] Refill Protocol.    ADAM SALINAS RN on 5/21/2024 at 12:08 PM

## 2024-08-12 DIAGNOSIS — B02.30 OPHTHALMIC HERPES ZOSTER: ICD-10-CM

## 2024-08-14 RX ORDER — ACYCLOVIR 400 MG/1
400 TABLET ORAL 2 TIMES DAILY
Qty: 180 TABLET | Refills: 0 | Status: SHIPPED | OUTPATIENT
Start: 2024-08-14

## 2024-08-14 NOTE — TELEPHONE ENCOUNTER
[id number] of [place] sent Rx request for the following:      Requested Prescriptions   Pending Prescriptions Disp Refills    acyclovir (ZOVIRAX) 400 MG tablet [Pharmacy Med Name: Acyclovir 400 MG Oral Tablet] 180 tablet 0     Sig: Take 1 tablet by mouth twice daily   Last Prescription Date:   5/21/24  Last Fill Qty/Refills:         180, R-0    Last Office Visit:                3/4/24   1/11/24 (Physical)    Future Office visit:             Future Appointments 8/14/2024 - 2/10/2025        Date Visit Type Length Department Provider     1/13/2025  8:15 AM ANNUAL WELLNESS 30 min  FAMILY PRACTICE Fady Oconnor MD    Location Instructions:     [facility] is located [address]                   Per LOV note:    Return in about 53 weeks (around 1/16/2025) for Annual Wellness Visit.    Prescription refilled per RN Medication Refill Policy.................... Bekah Robles RN ....................  8/14/2024   12:15 PM

## 2024-11-07 ENCOUNTER — HOSPITAL ENCOUNTER (OUTPATIENT)
Dept: MAMMOGRAPHY | Facility: OTHER | Age: 69
Discharge: HOME | End: 2024-11-07
Attending: FAMILY MEDICINE | Admitting: FAMILY MEDICINE
Payer: MEDICARE

## 2024-11-07 DIAGNOSIS — Z12.31 VISIT FOR SCREENING MAMMOGRAM: ICD-10-CM

## 2024-11-07 PROCEDURE — 77063 BREAST TOMOSYNTHESIS BI: CPT

## 2024-11-10 DIAGNOSIS — B02.30 OPHTHALMIC HERPES ZOSTER: ICD-10-CM

## 2024-11-13 RX ORDER — ACYCLOVIR 400 MG/1
400 TABLET ORAL 2 TIMES DAILY
Qty: 180 TABLET | Refills: 0 | Status: SHIPPED | OUTPATIENT
Start: 2024-11-13

## 2024-11-13 NOTE — TELEPHONE ENCOUNTER
[facility] sent Rx request for the following:      Requested Prescriptions   Pending Prescriptions Disp Refills    acyclovir (ZOVIRAX) 400 MG tablet [Pharmacy Med Name: Acyclovir 400 MG Oral Tablet] 180 tablet 0     Sig: Take 1 tablet by mouth twice daily       Antivirals Passed - 11/13/2024  9:56 AM       Last Prescription Date:   8/14/24  Last Fill Qty/Refills:         180, R-0    Last Office Visit:             1/11/24,  1/9/23 when associated diagnosis addressed   Future Office visit:             Next 5 appointments (look out 90 days)      Jan 13, 2025 8:15 AM  (Arrive by 8:00 AM)  Annual Wellness Visit with Fady Oconnor MD  [facility] ([facility] ) [address]  [place] [address]  [phone]          Prescription approved per [facility] Refill Protocol.  Madeline Celaya RN on 11/13/2024 at 10:00 AM

## 2024-12-05 ENCOUNTER — MYC REFILL (OUTPATIENT)
Dept: FAMILY MEDICINE | Facility: OTHER | Age: 69
End: 2024-12-05
Payer: COMMERCIAL

## 2024-12-05 DIAGNOSIS — Z79.890 PREMATURE MENOPAUSE ON HRT: ICD-10-CM

## 2024-12-05 DIAGNOSIS — E28.319 PREMATURE MENOPAUSE ON HRT: ICD-10-CM

## 2024-12-05 RX ORDER — ESTERIFIED ESTROGEN AND METHYLTESTOSTERONE 1.25; 2.5 MG/1; MG/1
1 TABLET ORAL DAILY
Qty: 90 TABLET | Refills: 4 | Status: SHIPPED | OUTPATIENT
Start: 2024-12-05

## 2024-12-05 NOTE — TELEPHONE ENCOUNTER
Requested Prescriptions   Pending Prescriptions Disp Refills    estrogens-methylTESTOSTERone (ESTRATEST) 1.25-2.5 MG per tablet 90 tablet 4     Sig: Take 1 tablet by mouth daily.       There is no refill protocol information for this order        Last Prescription Date:   1/15/24  Last Fill Qty/Refills:         90, R-4    Premature menopause on HRT [E28.319, Z79.890]        Last Office Visit:                3/4/24   1/11/24 (Px)    Future Office visit:             Next 5 appointments (look out 90 days)      Jan 13, 2025 8:30 AM  (Arrive by 8:15 AM)  Annual Wellness Visit with Fady Oconnor MD  [facility] ([facility]) [address]  [place] [id number] [phone]     Unable to complete prescription refill per RN Medication Refill Policy. Bekah Robles RN .............. 12/5/2024  1:29 PM

## 2025-01-08 SDOH — HEALTH STABILITY: PHYSICAL HEALTH: ON AVERAGE, HOW MANY MINUTES DO YOU ENGAGE IN EXERCISE AT THIS LEVEL?: 20 MIN

## 2025-01-08 SDOH — HEALTH STABILITY: PHYSICAL HEALTH: ON AVERAGE, HOW MANY DAYS PER WEEK DO YOU ENGAGE IN MODERATE TO STRENUOUS EXERCISE (LIKE A BRISK WALK)?: 2 DAYS

## 2025-01-08 ASSESSMENT — SOCIAL DETERMINANTS OF HEALTH (SDOH): HOW OFTEN DO YOU GET TOGETHER WITH FRIENDS OR RELATIVES?: MORE THAN THREE TIMES A WEEK

## 2025-01-13 ENCOUNTER — OFFICE VISIT (OUTPATIENT)
Dept: FAMILY MEDICINE | Facility: OTHER | Age: 70
End: 2025-01-13
Attending: FAMILY MEDICINE
Payer: COMMERCIAL

## 2025-01-13 VITALS
SYSTOLIC BLOOD PRESSURE: 132 MMHG | BODY MASS INDEX: 24.04 KG/M2 | HEIGHT: 64 IN | DIASTOLIC BLOOD PRESSURE: 84 MMHG | OXYGEN SATURATION: 99 % | WEIGHT: 140.8 LBS | HEART RATE: 70 BPM | TEMPERATURE: 97 F | RESPIRATION RATE: 16 BRPM

## 2025-01-13 DIAGNOSIS — R13.10 DYSPHAGIA, UNSPECIFIED TYPE: ICD-10-CM

## 2025-01-13 DIAGNOSIS — E03.8 SUBCLINICAL HYPOTHYROIDISM: ICD-10-CM

## 2025-01-13 DIAGNOSIS — I48.0 PAROXYSMAL ATRIAL FIBRILLATION (H): ICD-10-CM

## 2025-01-13 DIAGNOSIS — R10.11 RUQ ABDOMINAL PAIN: ICD-10-CM

## 2025-01-13 DIAGNOSIS — B02.30 OPHTHALMIC HERPES ZOSTER: ICD-10-CM

## 2025-01-13 DIAGNOSIS — K21.00 GASTROESOPHAGEAL REFLUX DISEASE WITH ESOPHAGITIS WITHOUT HEMORRHAGE: ICD-10-CM

## 2025-01-13 DIAGNOSIS — L71.9 ROSACEA: ICD-10-CM

## 2025-01-13 DIAGNOSIS — Z00.00 ENCOUNTER FOR MEDICARE ANNUAL WELLNESS EXAM: Primary | ICD-10-CM

## 2025-01-13 LAB
ALBUMIN SERPL BCG-MCNC: 4.3 G/DL (ref 3.5–5.2)
ALP SERPL-CCNC: 94 U/L (ref 40–150)
ALT SERPL W P-5'-P-CCNC: 18 U/L (ref 0–50)
ANION GAP SERPL CALCULATED.3IONS-SCNC: 9 MMOL/L (ref 7–15)
AST SERPL W P-5'-P-CCNC: 24 U/L (ref 0–45)
BILIRUB SERPL-MCNC: 0.5 MG/DL
BUN SERPL-MCNC: 10 MG/DL (ref 8–23)
CALCIUM SERPL-MCNC: 9.6 MG/DL (ref 8.8–10.4)
CHLORIDE SERPL-SCNC: 101 MMOL/L (ref 98–107)
CREAT SERPL-MCNC: 0.84 MG/DL (ref 0.51–0.95)
EGFRCR SERPLBLD CKD-EPI 2021: 75 ML/MIN/1.73M2
ERYTHROCYTE [DISTWIDTH] IN BLOOD BY AUTOMATED COUNT: 13 % (ref 10–15)
GLUCOSE SERPL-MCNC: 94 MG/DL (ref 70–99)
HCO3 SERPL-SCNC: 28 MMOL/L (ref 22–29)
HCT VFR BLD AUTO: 41.3 % (ref 35–47)
HGB BLD-MCNC: 14.1 G/DL (ref 11.7–15.7)
MCH RBC QN AUTO: 31.3 PG (ref 26.5–33)
MCHC RBC AUTO-ENTMCNC: 34.1 G/DL (ref 31.5–36.5)
MCV RBC AUTO: 92 FL (ref 78–100)
PLATELET # BLD AUTO: 279 10E3/UL (ref 150–450)
POTASSIUM SERPL-SCNC: 4.3 MMOL/L (ref 3.4–5.3)
PROT SERPL-MCNC: 7.4 G/DL (ref 6.4–8.3)
RBC # BLD AUTO: 4.51 10E6/UL (ref 3.8–5.2)
SODIUM SERPL-SCNC: 138 MMOL/L (ref 135–145)
TSH SERPL DL<=0.005 MIU/L-ACNC: 3.64 UIU/ML (ref 0.3–4.2)
WBC # BLD AUTO: 4.6 10E3/UL (ref 4–11)

## 2025-01-13 PROCEDURE — 36415 COLL VENOUS BLD VENIPUNCTURE: CPT | Mod: ZL | Performed by: FAMILY MEDICINE

## 2025-01-13 PROCEDURE — 80053 COMPREHEN METABOLIC PANEL: CPT | Mod: ZL | Performed by: FAMILY MEDICINE

## 2025-01-13 PROCEDURE — 85014 HEMATOCRIT: CPT | Mod: ZL | Performed by: FAMILY MEDICINE

## 2025-01-13 PROCEDURE — G0463 HOSPITAL OUTPT CLINIC VISIT: HCPCS

## 2025-01-13 PROCEDURE — 84443 ASSAY THYROID STIM HORMONE: CPT | Mod: ZL | Performed by: FAMILY MEDICINE

## 2025-01-13 RX ORDER — METRONIDAZOLE 7.5 MG/G
GEL TOPICAL 2 TIMES DAILY
Qty: 45 G | Refills: 3 | Status: SHIPPED | OUTPATIENT
Start: 2025-01-13

## 2025-01-13 RX ORDER — ACYCLOVIR 400 MG/1
400 TABLET ORAL 2 TIMES DAILY
Qty: 180 TABLET | Refills: 4 | Status: SHIPPED | OUTPATIENT
Start: 2025-01-13

## 2025-01-13 RX ORDER — DOXYCYCLINE 50 MG/1
50 CAPSULE ORAL 2 TIMES DAILY
Qty: 180 CAPSULE | Refills: 4 | Status: SHIPPED | OUTPATIENT
Start: 2025-01-13

## 2025-01-13 ASSESSMENT — PAIN SCALES - GENERAL: PAINLEVEL_OUTOF10: NO PAIN (0)

## 2025-01-13 NOTE — PROGRESS NOTES
Preventive Care Visit  [facility] AND [facility]  Fady Oconnor MD, Family Medicine  Jan 13, 2025      Assessment & Plan     (Z00.00) Encounter for Medicare annual wellness exam  (primary encounter diagnosis)  Comment: see below  Plan:      (B02.30) Ophthalmic herpes zoster  Comment: no symptoms,   Plan: acyclovir (ZOVIRAX) 400 MG tablet        Refilled without changes    (L71.9) Rosacea  Comment: this is cleared clinically now. Can safely now hold the doxy. Can resume if significant side effects return.   Plan: doxycycline monohydrate (MONODOX) 50 MG         capsule, metroNIDAZOLE (METROGEL) 0.75 %         external gel             (K21.00) Gastroesophageal reflux disease with esophagitis without hemorrhage  Comment: might have had a several month flare over the fall. Symptoms now gone. Has some dysphagia now and will get an EGD. Last was in 2027.   Plan: omeprazole (PRILOSEC) 20 MG DR capsule        Refilled without changes     (I48.0) Paroxysmal atrial fibrillation (H)  Comment:  normal sinus rhythm on exam  Plan: rivaroxaban ANTICOAGULANT (XARELTO         ANTICOAGULANT) 20 MG TABS tablet        Refilled without changes     (E03.8) Subclinical hypothyroidism  Comment: remains stable  Plan: TSH        No meds indicated     (R10.11) RUQ abdominal pain  Comment: now resolved. This could be her dyspepsia, but also might have had some liver involvement with her viral infection too. Symptoms now resolved. Will check her her LFTs as an initial work up and if these are normal can just observe.   Plan: Comprehensive Metabolic Panel, CBC W PLT No         Diff               (R13.10) Dysphagia, unspecified type  Comment: SHe has some risks for esophageal stricture. EGD ordered  Plan: Adult GI  Referral - Procedure Only             The longitudinal plan of care for the diagnosis(es)/condition(s) as documented were addressed during this visit. Due to the added complexity in care, I will continue to support Muna  "in the subsequent management and with ongoing continuity of care.            Counseling  Appropriate preventive services were addressed with this patient via screening, questionnaire, or discussion as appropriate for fall prevention, nutrition, physical activity, Tobacco-use cessation, social engagement, weight loss and cognition.  Checklist reviewing preventive services available has been given to the patient.  Reviewed patient's diet, addressing concerns and/or questions.   She is at risk for lack of exercise and has been provided with information to increase physical activity for the benefit of her well-being.   Discussed possible causes of fatigue.         No follow-ups on file.    Bryon Toro is a 69 year old, presenting for the following:  Medicare Visit        1/13/2025     8:17 AM   Additional Questions   Roomed by AURELIA Barry   Accompanied by Self         1/13/2025     8:17 AM   Patient Reported Additional Medications   Patient reports taking the following new medications N/A           History of Present Illness       Reason for visit:  Annual Check up   She is taking medications regularly.    Has had some epigastric pain since last fall. She stopped her doxy, along with some foods like fresh fruit and pains resolved. Has had a cholecystectomy, but some of the pain was just slightly in ruq. Had COVD with this and she feels this too was contributing. In the last few days is now \"100% normal\" with her abdomen pain. Wants to stay off the doxy for a few months. It completely cleared her rosacea for her. Does get some dysphagia, mostly with pills, at the level of her upper sternum mostly. Happens perhaps once a week.     Had an event monitor in 2018, showing SVT, presumed to be a fib. On a DOAC now, without significant bleeding. Had runs of a fib with her COVID infection, that would resolve on its own. Lasting perhaps a few minutes only.     Is on HRT, works well. No side effects. Understands increased " breast cancer risk and wants to continues with the meds.         Health Care Directive  Patient does not have a Health Care Directive: Patient states has Advance Directive and will bring in a copy to clinic.      1/8/2025   General Health   How would you rate your overall physical health? Good   Feel stress (tense, anxious, or unable to sleep) Only a little   (!) STRESS CONCERN      1/8/2025   Nutrition   Diet: Regular (no restrictions)         1/8/2025   Exercise   Days per week of moderate/strenous exercise 2 days   Average minutes spent exercising at this level 20 min   (!) EXERCISE CONCERN      1/8/2025   Social Factors   Frequency of gathering with friends or relatives More than three times a week   Worry food won't last until get money to buy more No   Food not last or not have enough money for food? No   Do you have housing? (Housing is defined as stable permanent housing and does not include staying ouside in a car, in a tent, in an abandoned building, in an overnight shelter, or couch-surfing.) Yes   Are you worried about losing your housing? No   Lack of transportation? No   Unable to get utilities (heat,electricity)? No         1/8/2025   Fall Risk   Fallen 2 or more times in the past year? No   Trouble with walking or balance? No          1/8/2025   Activities of Daily Living- Home Safety   Needs help with the following daily activites None of the above   Safety concerns in the home None of the above         1/8/2025   Dental   Dentist two times every year? Yes         1/8/2025   Hearing Screening   Hearing concerns? None of the above         1/8/2025   Driving Risk Screening   Patient/family members have concerns about driving No         1/8/2025   General Alertness/Fatigue Screening   Have you been more tired than usual lately? (!) YES         1/8/2025   Urinary Incontinence Screening   Bothered by leaking urine in past 6 months No         1/8/2025   TB Screening   Were you born outside of the [place]? No          Today's PHQ-2 Score:       1/12/2025    12:16 PM   PHQ-2 ( 1999 Pf[employer])   Q1: Little interest or pleasure in doing things 0   Q2: Feeling down, depressed or hopeless 0   PHQ-2 Score 0    Q1: Little interest or pleasure in doing things Not at all   Q2: Feeling down, depressed or hopeless Not at all   PHQ-2 Score 0       Patient-reported           1/8/2025   Substance Use   Alcohol more than 3/day or more than 7/wk Not Applicable   Do you have a current opioid prescription? No   How severe/bad is pain from 1 to 10? 1/10   Do you use any other substances recreationally? No     Social History     Tobacco Use    Smoking status: Never     Passive exposure: Current    Smokeless tobacco: Never   Vaping Use    Vaping status: Never Used   Substance Use Topics    Alcohol use: No     Alcohol/week: 0.0 standard drinks of alcohol    Drug use: No           11/7/2024   LAST FHS-7 RESULTS   1st degree relative breast or ovarian cancer No   Any relative bilateral breast cancer No   Any male have breast cancer No   Any ONE woman have BOTH breast AND ovarian cancer No   Any woman with breast cancer before 50yrs No   2 or more relatives with breast AND/OR ovarian cancer No   2 or more relatives with breast AND/OR bowel cancer No              History of abnormal Pap smear: Status post hysterectomy with removal of cervix and no history of CIN2 or greater or cervical cancer. Health Maintenance and Surgical History updated.       ASCVD Risk   The 10-year ASCVD risk score (Soheila VILLALBA, et al., 2019) is: 9.6%    Values used to calculate the score:      Age: 69 years      Sex: Female      Is Non- : No      Diabetic: No      Tobacco smoker: No      Systolic Blood Pressure: 132 mmHg      Is BP treated: No      HDL Cholesterol: 40 mg/dL      Total Cholesterol: 195 mg/dL            Reviewed and updated as needed this visit by Provider                    Past Medical History:   Diagnosis Date    Asymptomatic  premature menopause     mid 30s    Cardiac arrhythmia     No Comments Provided    Diaphragmatic hernia without obstruction or gangrene     No Comments Provided    Hypoglycemia     episodes of hypoglycemia if not eating    Paroxysmal atrial fibrillation (H) 5/18/2018    Pupillary abnormality of both eyes     1993,Adie's tonic pupil (bilateral)     Past Surgical History:   Procedure Laterality Date    COLONOSCOPY  08/11/2017    Done by Dr. Barbara Goss    ENDOSCOPY UPPER, COLONOSCOPY, COMBINED      8/11/2017    ESOPHAGOSCOPY, GASTROSCOPY, DUODENOSCOPY (EGD), COMBINED      2009    HYSTERECTOMY TOTAL ABDOMINAL, BILATERAL SALPINGO-OOPHORECTOMY, COMBINED      2003    LAPAROSCOPIC CHOLECYSTECTOMY      2007    LAPAROSCOPIC TUBAL LIGATION      1980s     Current Outpatient Medications   Medication Sig Dispense Refill    acyclovir (ZOVIRAX) 400 MG tablet Take 1 tablet by mouth twice daily 180 tablet 0    estrogens-methylTESTOSTERone (ESTRATEST) 1.25-2.5 MG per tablet Take 1 tablet by mouth daily. 90 tablet 4    Lysine HCl 1000 MG TABS Take 1,000 mg by mouth daily      omeprazole (PRILOSEC) 20 MG DR capsule Take 1 capsule (20 mg) by mouth daily 90 capsule 3    rivaroxaban ANTICOAGULANT (XARELTO ANTICOAGULANT) 20 MG TABS tablet Take 1 tablet (20 mg) by mouth daily (with dinner) 90 tablet 4    doxycycline monohydrate (MONODOX) 50 MG capsule Take 1 capsule (50 mg) by mouth 2 times daily (Patient not taking: Reported on 1/13/2025) 180 capsule 4    metroNIDAZOLE (METROGEL) 0.75 % external gel Apply topically 2 times daily (Patient not taking: Reported on 1/13/2025) 45 g 3     Allergies   Allergen Reactions    Clindamycin Difficulty breathing    Erythromycin Nausea and Vomiting    Levofloxacin Other (See Comments) and Palpitations    Penicillins Rash    Sulfa Antibiotics Rash     Current providers sharing in care for this patient include:  Patient Care Team:  Fady Oconnor MD as PCP - General (Family Practice)  Fady Oconnor MD as  "Assigned PCP  Kip Dennis MD as Assigned Heart and Vascular Provider  Lowell Maza MD as Assigned Surgical Provider    The following health maintenance items are reviewed in Epic and correct as of today:  Health Maintenance   Topic Date Due    MEDICARE ANNUAL WELLNESS VISIT  01/11/2025    FALL RISK ASSESSMENT  01/13/2026    MAMMO SCREENING  11/07/2026    LIPID  01/07/2027    GLUCOSE  02/23/2027    COLORECTAL CANCER SCREENING  08/11/2027    DTAP/TDAP/TD IMMUNIZATION (2 - Td or Tdap) 09/14/2027    ADVANCE CARE PLANNING  02/23/2029    DEXA  02/08/2039    PHQ-2 (once per calendar year)  Completed    INFLUENZA VACCINE  Completed    Pneumococcal Vaccine: 50+ Years  Completed    ZOSTER IMMUNIZATION  Completed    RSV VACCINE  Completed    COVID-19 Vaccine  Completed    HPV IMMUNIZATION  Aged Out    MENINGITIS IMMUNIZATION  Aged Out    RSV MONOCLONAL ANTIBODY  Aged Out    HEPATITIS C SCREENING  Discontinued            Objective    Exam  /84   Pulse 70   Temp 97  F (36.1  C) (Temporal)   Resp 16   Ht 1.626 m (5' 4\")   Wt 63.9 kg (140 lb 12.8 oz)   LMP  (LMP Unknown)   SpO2 99%   BMI 24.17 kg/m     Estimated body mass index is 24.17 kg/m  as calculated from the following:    Height as of this encounter: 1.626 m (5' 4\").    Weight as of this encounter: 63.9 kg (140 lb 12.8 oz).    Physical Exam  GENERAL: alert and no distress  EYES: Eyes grossly normal to inspection, PERRL and conjunctivae and sclerae normal  HENT: ear canals and TM's normal, nose and mouth without ulcers or lesions  NECK: no adenopathy, no asymmetry, masses, or scars  RESP: lungs clear to auscultation - no rales, rhonchi or wheezes  CV: regular rate and rhythm, normal S1 S2, no S3 or S4, no murmur, click or rub, no peripheral edema  ABDOMEN: soft, nontender, no hepatosplenomegaly, no masses and bowel sounds normal  MS: no gross musculoskeletal defects noted, no edema  SKIN: no suspicious lesions or rashes  NEURO: Normal strength and " tone, mentation intact and speech normal  PSYCH: mentation appears normal, affect normal/bright        1/13/2025   Mini Cog   Clock Draw Score 2 Normal   3 Item Recall 3 objects recalled   Mini Cog Total Score 5           Results for orders placed or performed in visit on 01/13/25   Comprehensive Metabolic Panel     Status: Normal   Result Value Ref Range    Sodium 138 135 - 145 mmol/L    Potassium 4.3 3.4 - 5.3 mmol/L    Carbon Dioxide (CO2) 28 22 - 29 mmol/L    Anion Gap 9 7 - 15 mmol/L    Urea Nitrogen 10.0 8.0 - 23.0 mg/dL    Creatinine 0.84 0.51 - 0.95 mg/dL    GFR Estimate 75 >60 mL/min/1.73m2    Calcium 9.6 8.8 - 10.4 mg/dL    Chloride 101 98 - 107 mmol/L    Glucose 94 70 - 99 mg/dL    Alkaline Phosphatase 94 40 - 150 U/L    AST 24 0 - 45 U/L    ALT 18 0 - 50 U/L    Protein Total 7.4 6.4 - 8.3 g/dL    Albumin 4.3 3.5 - 5.2 g/dL    Bilirubin Total 0.5 <=1.2 mg/dL   CBC W PLT No Diff     Status: Normal   Result Value Ref Range    WBC Count 4.6 4.0 - 11.0 10e3/uL    RBC Count 4.51 3.80 - 5.20 10e6/uL    Hemoglobin 14.1 11.7 - 15.7 g/dL    Hematocrit 41.3 35.0 - 47.0 %    MCV 92 78 - 100 fL    MCH 31.3 26.5 - 33.0 pg    MCHC 34.1 31.5 - 36.5 g/dL    RDW 13.0 10.0 - 15.0 %    Platelet Count 279 150 - 450 10e3/uL   TSH     Status: Normal   Result Value Ref Range    TSH 3.64 0.30 - 4.20 uIU/mL            Signed Electronically by: Fady Oconnor MD

## 2025-01-13 NOTE — H&P (VIEW-ONLY)
Preventive Care Visit  [facility] AND [facility]  Fady Oconnor MD, Family Medicine  Jan 13, 2025      Assessment & Plan     (Z00.00) Encounter for Medicare annual wellness exam  (primary encounter diagnosis)  Comment: see below  Plan:      (B02.30) Ophthalmic herpes zoster  Comment: no symptoms,   Plan: acyclovir (ZOVIRAX) 400 MG tablet        Refilled without changes    (L71.9) Rosacea  Comment: this is cleared clinically now. Can safely now hold the doxy. Can resume if significant side effects return.   Plan: doxycycline monohydrate (MONODOX) 50 MG         capsule, metroNIDAZOLE (METROGEL) 0.75 %         external gel             (K21.00) Gastroesophageal reflux disease with esophagitis without hemorrhage  Comment: might have had a several month flare over the fall. Symptoms now gone. Has some dysphagia now and will get an EGD. Last was in 2027.   Plan: omeprazole (PRILOSEC) 20 MG DR capsule        Refilled without changes     (I48.0) Paroxysmal atrial fibrillation (H)  Comment:  normal sinus rhythm on exam  Plan: rivaroxaban ANTICOAGULANT (XARELTO         ANTICOAGULANT) 20 MG TABS tablet        Refilled without changes     (E03.8) Subclinical hypothyroidism  Comment: remains stable  Plan: TSH        No meds indicated     (R10.11) RUQ abdominal pain  Comment: now resolved. This could be her dyspepsia, but also might have had some liver involvement with her viral infection too. Symptoms now resolved. Will check her her LFTs as an initial work up and if these are normal can just observe.   Plan: Comprehensive Metabolic Panel, CBC W PLT No         Diff               (R13.10) Dysphagia, unspecified type  Comment: SHe has some risks for esophageal stricture. EGD ordered  Plan: Adult GI  Referral - Procedure Only             The longitudinal plan of care for the diagnosis(es)/condition(s) as documented were addressed during this visit. Due to the added complexity in care, I will continue to support Muna  "in the subsequent management and with ongoing continuity of care.            Counseling  Appropriate preventive services were addressed with this patient via screening, questionnaire, or discussion as appropriate for fall prevention, nutrition, physical activity, Tobacco-use cessation, social engagement, weight loss and cognition.  Checklist reviewing preventive services available has been given to the patient.  Reviewed patient's diet, addressing concerns and/or questions.   She is at risk for lack of exercise and has been provided with information to increase physical activity for the benefit of her well-being.   Discussed possible causes of fatigue.         No follow-ups on file.    Bryon Toro is a 69 year old, presenting for the following:  Medicare Visit        1/13/2025     8:17 AM   Additional Questions   Roomed by AURELIA Barry   Accompanied by Self         1/13/2025     8:17 AM   Patient Reported Additional Medications   Patient reports taking the following new medications N/A           History of Present Illness       Reason for visit:  Annual Check up   She is taking medications regularly.    Has had some epigastric pain since last fall. She stopped her doxy, along with some foods like fresh fruit and pains resolved. Has had a cholecystectomy, but some of the pain was just slightly in ruq. Had COVD with this and she feels this too was contributing. In the last few days is now \"100% normal\" with her abdomen pain. Wants to stay off the doxy for a few months. It completely cleared her rosacea for her. Does get some dysphagia, mostly with pills, at the level of her upper sternum mostly. Happens perhaps once a week.     Had an event monitor in 2018, showing SVT, presumed to be a fib. On a DOAC now, without significant bleeding. Had runs of a fib with her COVID infection, that would resolve on its own. Lasting perhaps a few minutes only.     Is on HRT, works well. No side effects. Understands increased " breast cancer risk and wants to continues with the meds.         Health Care Directive  Patient does not have a Health Care Directive: Patient states has Advance Directive and will bring in a copy to clinic.      1/8/2025   General Health   How would you rate your overall physical health? Good   Feel stress (tense, anxious, or unable to sleep) Only a little   (!) STRESS CONCERN      1/8/2025   Nutrition   Diet: Regular (no restrictions)         1/8/2025   Exercise   Days per week of moderate/strenous exercise 2 days   Average minutes spent exercising at this level 20 min   (!) EXERCISE CONCERN      1/8/2025   Social Factors   Frequency of gathering with friends or relatives More than three times a week   Worry food won't last until get money to buy more No   Food not last or not have enough money for food? No   Do you have housing? (Housing is defined as stable permanent housing and does not include staying ouside in a car, in a tent, in an abandoned building, in an overnight shelter, or couch-surfing.) Yes   Are you worried about losing your housing? No   Lack of transportation? No   Unable to get utilities (heat,electricity)? No         1/8/2025   Fall Risk   Fallen 2 or more times in the past year? No   Trouble with walking or balance? No          1/8/2025   Activities of Daily Living- Home Safety   Needs help with the following daily activites None of the above   Safety concerns in the home None of the above         1/8/2025   Dental   Dentist two times every year? Yes         1/8/2025   Hearing Screening   Hearing concerns? None of the above         1/8/2025   Driving Risk Screening   Patient/family members have concerns about driving No         1/8/2025   General Alertness/Fatigue Screening   Have you been more tired than usual lately? (!) YES         1/8/2025   Urinary Incontinence Screening   Bothered by leaking urine in past 6 months No         1/8/2025   TB Screening   Were you born outside of the [place]? No          Today's PHQ-2 Score:       1/12/2025    12:16 PM   PHQ-2 ( 1999 [employer])   Q1: Little interest or pleasure in doing things 0   Q2: Feeling down, depressed or hopeless 0   PHQ-2 Score 0    Q1: Little interest or pleasure in doing things Not at all   Q2: Feeling down, depressed or hopeless Not at all   PHQ-2 Score 0       Patient-reported           1/8/2025   Substance Use   Alcohol more than 3/day or more than 7/wk Not Applicable   Do you have a current opioid prescription? No   How severe/bad is pain from 1 to 10? 1/10   Do you use any other substances recreationally? No     Social History     Tobacco Use    Smoking status: Never     Passive exposure: Current    Smokeless tobacco: Never   Vaping Use    Vaping status: Never Used   Substance Use Topics    Alcohol use: No     Alcohol/week: 0.0 standard drinks of alcohol    Drug use: No           11/7/2024   LAST FHS-7 RESULTS   1st degree relative breast or ovarian cancer No   Any relative bilateral breast cancer No   Any male have breast cancer No   Any ONE woman have BOTH breast AND ovarian cancer No   Any woman with breast cancer before 50yrs No   2 or more relatives with breast AND/OR ovarian cancer No   2 or more relatives with breast AND/OR bowel cancer No              History of abnormal Pap smear: Status post hysterectomy with removal of cervix and no history of CIN2 or greater or cervical cancer. Health Maintenance and Surgical History updated.       ASCVD Risk   The 10-year ASCVD risk score (Soheila VILLALBA, et al., 2019) is: 9.6%    Values used to calculate the score:      Age: 69 years      Sex: Female      Is Non- : No      Diabetic: No      Tobacco smoker: No      Systolic Blood Pressure: 132 mmHg      Is BP treated: No      HDL Cholesterol: 40 mg/dL      Total Cholesterol: 195 mg/dL            Reviewed and updated as needed this visit by Provider                    Past Medical History:   Diagnosis Date    Asymptomatic  premature menopause     mid 30s    Cardiac arrhythmia     No Comments Provided    Diaphragmatic hernia without obstruction or gangrene     No Comments Provided    Hypoglycemia     episodes of hypoglycemia if not eating    Paroxysmal atrial fibrillation (H) 5/18/2018    Pupillary abnormality of both eyes     1993,Adie's tonic pupil (bilateral)     Past Surgical History:   Procedure Laterality Date    COLONOSCOPY  08/11/2017    Done by Dr. Barbara Goss    ENDOSCOPY UPPER, COLONOSCOPY, COMBINED      8/11/2017    ESOPHAGOSCOPY, GASTROSCOPY, DUODENOSCOPY (EGD), COMBINED      2009    HYSTERECTOMY TOTAL ABDOMINAL, BILATERAL SALPINGO-OOPHORECTOMY, COMBINED      2003    LAPAROSCOPIC CHOLECYSTECTOMY      2007    LAPAROSCOPIC TUBAL LIGATION      1980s     Current Outpatient Medications   Medication Sig Dispense Refill    acyclovir (ZOVIRAX) 400 MG tablet Take 1 tablet by mouth twice daily 180 tablet 0    estrogens-methylTESTOSTERone (ESTRATEST) 1.25-2.5 MG per tablet Take 1 tablet by mouth daily. 90 tablet 4    Lysine HCl 1000 MG TABS Take 1,000 mg by mouth daily      omeprazole (PRILOSEC) 20 MG DR capsule Take 1 capsule (20 mg) by mouth daily 90 capsule 3    rivaroxaban ANTICOAGULANT (XARELTO ANTICOAGULANT) 20 MG TABS tablet Take 1 tablet (20 mg) by mouth daily (with dinner) 90 tablet 4    doxycycline monohydrate (MONODOX) 50 MG capsule Take 1 capsule (50 mg) by mouth 2 times daily (Patient not taking: Reported on 1/13/2025) 180 capsule 4    metroNIDAZOLE (METROGEL) 0.75 % external gel Apply topically 2 times daily (Patient not taking: Reported on 1/13/2025) 45 g 3     Allergies   Allergen Reactions    Clindamycin Difficulty breathing    Erythromycin Nausea and Vomiting    Levofloxacin Other (See Comments) and Palpitations    Penicillins Rash    Sulfa Antibiotics Rash     Current providers sharing in care for this patient include:  Patient Care Team:  Fady Oconnor MD as PCP - General (Family Practice)  Fady Oconnor MD as  "Assigned PCP  Kip Dennis MD as Assigned Heart and Vascular Provider  Lowell Maza MD as Assigned Surgical Provider    The following health maintenance items are reviewed in Epic and correct as of today:  Health Maintenance   Topic Date Due    MEDICARE ANNUAL WELLNESS VISIT  01/11/2025    FALL RISK ASSESSMENT  01/13/2026    MAMMO SCREENING  11/07/2026    LIPID  01/07/2027    GLUCOSE  02/23/2027    COLORECTAL CANCER SCREENING  08/11/2027    DTAP/TDAP/TD IMMUNIZATION (2 - Td or Tdap) 09/14/2027    ADVANCE CARE PLANNING  02/23/2029    DEXA  02/08/2039    PHQ-2 (once per calendar year)  Completed    INFLUENZA VACCINE  Completed    Pneumococcal Vaccine: 50+ Years  Completed    ZOSTER IMMUNIZATION  Completed    RSV VACCINE  Completed    COVID-19 Vaccine  Completed    HPV IMMUNIZATION  Aged Out    MENINGITIS IMMUNIZATION  Aged Out    RSV MONOCLONAL ANTIBODY  Aged Out    HEPATITIS C SCREENING  Discontinued            Objective    Exam  /84   Pulse 70   Temp 97  F (36.1  C) (Temporal)   Resp 16   Ht 1.626 m (5' 4\")   Wt 63.9 kg (140 lb 12.8 oz)   LMP  (LMP Unknown)   SpO2 99%   BMI 24.17 kg/m     Estimated body mass index is 24.17 kg/m  as calculated from the following:    Height as of this encounter: 1.626 m (5' 4\").    Weight as of this encounter: 63.9 kg (140 lb 12.8 oz).    Physical Exam  GENERAL: alert and no distress  EYES: Eyes grossly normal to inspection, PERRL and conjunctivae and sclerae normal  HENT: ear canals and TM's normal, nose and mouth without ulcers or lesions  NECK: no adenopathy, no asymmetry, masses, or scars  RESP: lungs clear to auscultation - no rales, rhonchi or wheezes  CV: regular rate and rhythm, normal S1 S2, no S3 or S4, no murmur, click or rub, no peripheral edema  ABDOMEN: soft, nontender, no hepatosplenomegaly, no masses and bowel sounds normal  MS: no gross musculoskeletal defects noted, no edema  SKIN: no suspicious lesions or rashes  NEURO: Normal strength and " tone, mentation intact and speech normal  PSYCH: mentation appears normal, affect normal/bright        1/13/2025   Mini Cog   Clock Draw Score 2 Normal   3 Item Recall 3 objects recalled   Mini Cog Total Score 5           Results for orders placed or performed in visit on 01/13/25   Comprehensive Metabolic Panel     Status: Normal   Result Value Ref Range    Sodium 138 135 - 145 mmol/L    Potassium 4.3 3.4 - 5.3 mmol/L    Carbon Dioxide (CO2) 28 22 - 29 mmol/L    Anion Gap 9 7 - 15 mmol/L    Urea Nitrogen 10.0 8.0 - 23.0 mg/dL    Creatinine 0.84 0.51 - 0.95 mg/dL    GFR Estimate 75 >60 mL/min/1.73m2    Calcium 9.6 8.8 - 10.4 mg/dL    Chloride 101 98 - 107 mmol/L    Glucose 94 70 - 99 mg/dL    Alkaline Phosphatase 94 40 - 150 U/L    AST 24 0 - 45 U/L    ALT 18 0 - 50 U/L    Protein Total 7.4 6.4 - 8.3 g/dL    Albumin 4.3 3.5 - 5.2 g/dL    Bilirubin Total 0.5 <=1.2 mg/dL   CBC W PLT No Diff     Status: Normal   Result Value Ref Range    WBC Count 4.6 4.0 - 11.0 10e3/uL    RBC Count 4.51 3.80 - 5.20 10e6/uL    Hemoglobin 14.1 11.7 - 15.7 g/dL    Hematocrit 41.3 35.0 - 47.0 %    MCV 92 78 - 100 fL    MCH 31.3 26.5 - 33.0 pg    MCHC 34.1 31.5 - 36.5 g/dL    RDW 13.0 10.0 - 15.0 %    Platelet Count 279 150 - 450 10e3/uL   TSH     Status: Normal   Result Value Ref Range    TSH 3.64 0.30 - 4.20 uIU/mL            Signed Electronically by: Fady Oconnor MD

## 2025-01-13 NOTE — NURSING NOTE
"Chief Complaint   Patient presents with    Medicare Visit       Initial /84   Pulse 70   Temp 97  F (36.1  C) (Temporal)   Resp 16   Ht 1.626 m (5' 4\")   Wt 63.9 kg (140 lb 12.8 oz)   LMP  (LMP Unknown)   SpO2 99%   BMI 24.17 kg/m   Estimated body mass index is 24.17 kg/m  as calculated from the following:    Height as of this encounter: 1.626 m (5' 4\").    Weight as of this encounter: 63.9 kg (140 lb 12.8 oz).  Medication Reconciliation: complete          "

## 2025-01-13 NOTE — PATIENT INSTRUCTIONS
Patient Education   Preventive Care Advice   This is general advice given by our system to help you stay healthy. However, your care team may have specific advice just for you. Please talk to your care team about your preventive care needs.  Nutrition  Eat 5 or more servings of fruits and vegetables each day.  Try wheat bread, brown rice and whole grain pasta (instead of white bread, rice, and pasta).  Get enough calcium and vitamin D. Check the label on foods and aim for 100% of the RDA (recommended daily allowance).  Lifestyle  Exercise at least 150 minutes each week  (30 minutes a day, 5 days a week).  Do muscle strengthening activities 2 days a week. These help control your weight and prevent disease.  No smoking.  Wear sunscreen to prevent skin cancer.  Have a dental exam and cleaning every 6 months.  Yearly exams  See your health care team every year to talk about:  Any changes in your health.  Any medicines your care team has prescribed.  Preventive care, family planning, and ways to prevent chronic diseases.  Shots (vaccines)   HPV shots (up to age 26), if you've never had them before.  Hepatitis B shots (up to age 59), if you've never had them before.  COVID-19 shot: Get this shot when it's due.  Flu shot: Get a flu shot every year.  Tetanus shot: Get a tetanus shot every 10 years.  Pneumococcal, hepatitis A, and RSV shots: Ask your care team if you need these based on your risk.  Shingles shot (for age 50 and up)  General health tests  Diabetes screening:  Starting at age 35, Get screened for diabetes at least every 3 years.  If you are younger than age 35, ask your care team if you should be screened for diabetes.  Cholesterol test: At age 39, start having a cholesterol test every 5 years, or more often if advised.  Bone density scan (DEXA): At age 50, ask your care team if you should have this scan for osteoporosis (brittle bones).  Hepatitis C: Get tested at least once in your life.  STIs (sexually  transmitted infections)  Before age 24: Ask your care team if you should be screened for STIs.  After age 24: Get screened for STIs if you're at risk. You are at risk for STIs (including HIV) if:  You are sexually active with more than one person.  You don't use condoms every time.  You or a partner was diagnosed with a sexually transmitted infection.  If you are at risk for HIV, ask about PrEP medicine to prevent HIV.  Get tested for HIV at least once in your life, whether you are at risk for HIV or not.  Cancer screening tests  Cervical cancer screening: If you have a cervix, begin getting regular cervical cancer screening tests starting at age 21.  Breast cancer scan (mammogram): If you've ever had breasts, begin having regular mammograms starting at age 40. This is a scan to check for breast cancer.  Colon cancer screening: It is important to start screening for colon cancer at age 45.  Have a colonoscopy test every 10 years (or more often if you're at risk) Or, ask your provider about stool tests like a FIT test every year or Cologuard test every 3 years.  To learn more about your testing options, visit:   .  For help making a decision, visit:   https://bit.ly/yw47091.  Prostate cancer screening test: If you have a prostate, ask your care team if a prostate cancer screening test (PSA) at age 55 is right for you.  Lung cancer screening: If you are a current or former smoker ages 50 to 80, ask your care team if ongoing lung cancer screenings are right for you.  For informational purposes only. Not to replace the advice of your health care provider. Copyright   2023 [facility]. All rights reserved. Clinically reviewed by the [facility] Transitions [facility]. [employer] 518561 - REV 01/24.  Learning About Sleeping Well  What does sleeping well mean?     Sleeping well means getting enough sleep to feel good and stay healthy. How much sleep is enough varies among people.  The number of hours you  sleep and how you feel when you wake up are both important. If you do not feel refreshed, you probably need more sleep. Another sign of not getting enough sleep is feeling tired during the day.  Experts recommend that adults get at least 7 or more hours of sleep per day. Children and older adults need more sleep.  Why is getting enough sleep important?  Getting enough quality sleep is a basic part of good health. When your sleep suffers, your physical health, mood, and your thoughts can suffer too. You may find yourself feeling more grumpy or stressed. Not getting enough sleep also can lead to serious problems, including injury, accidents, anxiety, and depression.  What might cause poor sleeping?  Many things can cause sleep problems, including:  Changes to your sleep schedule.  Stress. Stress can be caused by fear about a single event, such as giving a speech. Or you may have ongoing stress, such as worry about work or school.  Depression, anxiety, and other mental or emotional conditions.  Changes in your sleep habits or surroundings. This includes changes that happen where you sleep, such as noise, light, or sleeping in a different bed. It also includes changes in your sleep pattern, such as having jet lag or working a late shift.  Health problems, such as pain, breathing problems, and restless legs syndrome.  Lack of regular exercise.  Using alcohol, nicotine, or caffeine before bed.  How can you help yourself?  Here are some tips that may help you sleep more soundly and wake up feeling more refreshed.  Your sleeping area   Use your bedroom only for sleeping and sex. A bit of light reading may help you fall asleep. But if it doesn't, do your reading elsewhere in the house. Try not to use your TV, computer, smartphone, or tablet while you are in bed.  Be sure your bed is big enough to stretch out comfortably, especially if you have a sleep partner.  Keep your bedroom quiet, dark, and cool. Use curtains, blinds,  "or a sleep mask to block out light. To block out noise, use earplugs, soothing music, or a \"white noise\" machine.  Your evening and bedtime routine   Create a relaxing bedtime routine. You might want to take a warm shower or bath, or listen to soothing music.  Go to bed at the same time every night. And get up at the same time every morning, even if you feel tired.  What to avoid   Limit caffeine (coffee, tea, caffeinated sodas) during the day, and don't have any for at least 6 hours before bedtime.  Avoid drinking alcohol before bedtime. Alcohol can cause you to wake up more often during the night.  Try not to smoke or use tobacco, especially in the evening. Nicotine can keep you awake.  Limit naps during the day, especially close to bedtime.  Avoid lying in bed awake for too long. If you can't fall asleep or if you wake up in the middle of the night and can't get back to sleep within about 20 minutes, get out of bed and go to another room until you feel sleepy.  Avoid taking medicine right before bed that may keep you awake or make you feel hyper or energized. Your doctor can tell you if your medicine may do this and if you can take it earlier in the day.  If you can't sleep   Imagine yourself in a peaceful, pleasant scene. Focus on the details and feelings of being in a place that is relaxing.  Get up and do a quiet or boring activity until you feel sleepy.  Avoid drinking any liquids before going to bed to help prevent waking up often to use the bathroom.  Where can you learn more?  Go to https://www.[employer].net/patiented  Enter J942 in the search box to learn more about \"Learning About Sleeping Well.\"  Current as of: July 31, 2024  Content Version: 14.3    2024 [employer].   Care instructions adapted under license by your healthcare professional. If you have questions about a medical condition or this instruction, always ask your healthcare professional. [employer] disclaims any " warranty or liability for your use of this information.

## 2025-01-14 ENCOUNTER — TELEPHONE (OUTPATIENT)
Dept: SURGERY | Facility: OTHER | Age: 70
End: 2025-01-14
Payer: COMMERCIAL

## 2025-01-14 NOTE — TELEPHONE ENCOUNTER
[facility] Diagnostic Referral    Patient has a referral for an EGD with a diagnosis of Dysphagia, unspecified type.    Please advise.    Thank you,  Chelly Yeung on 1/14/2025 at 3:15 PM

## 2025-01-15 ENCOUNTER — TELEPHONE (OUTPATIENT)
Dept: SURGERY | Facility: OTHER | Age: 70
End: 2025-01-15
Payer: COMMERCIAL

## 2025-01-15 NOTE — TELEPHONE ENCOUNTER
Screening Questions for the Scheduling of Screening Colonoscopies   (If Colonoscopy is diagnostic, Provider should review the chart before scheduling.)  Are you younger than 45 or older than 80?  NO  Do you take aspirin or fish oil?  NO (if yes, tell patient to stop 1 week prior to Colonoscopy)  Do you take warfarin (Coumadin), clopidogrel (Plavix), apixaban (Eliquis), dabigatram (Pradaxa), rivaroxaban (Xarelto) or any blood thinner? XARELTO  Do you take semaglutide (Ozempic or Wegovy), tirzepatide (Mounjaro or Zepbound), liraglutide (Victoza), or dulaglutide (Trulicity)? NO  Do you use oxygen or a CPAP at home?  NO  Do you have kidney disease? NO  Are you on dialysis? NO  Have you had a stroke or heart attack in the last year? NO  Have you had a stent in your heart or any blood vessel in the last year? NO  Have you had a transplant of any organ? NO  Have you had a colonoscopy or upper endoscopy (EGD) before? YES         When?  EGD & C-SCOPE 2017  Date of scheduled Colonoscopy. 01/21/2025  Provider [facility]  Pharmacy [facility]

## 2025-01-15 NOTE — TELEPHONE ENCOUNTER
Ok to schedule. Needs to be off Eliquis for 2 days before if ok with primary care provider. Thanks! Barbara Goss MD on 1/15/2025 at 7:57 AM

## 2025-01-21 ENCOUNTER — ANESTHESIA (OUTPATIENT)
Dept: SURGERY | Facility: OTHER | Age: 70
End: 2025-01-21
Payer: COMMERCIAL

## 2025-01-21 ENCOUNTER — ANESTHESIA EVENT (OUTPATIENT)
Dept: SURGERY | Facility: OTHER | Age: 70
End: 2025-01-21
Payer: COMMERCIAL

## 2025-01-21 ENCOUNTER — HOSPITAL ENCOUNTER (OUTPATIENT)
Facility: OTHER | Age: 70
Discharge: HOME | End: 2025-01-21
Attending: SURGERY | Admitting: SURGERY
Payer: COMMERCIAL

## 2025-01-21 VITALS
OXYGEN SATURATION: 100 % | WEIGHT: 140 LBS | TEMPERATURE: 96 F | HEART RATE: 64 BPM | DIASTOLIC BLOOD PRESSURE: 61 MMHG | HEIGHT: 64 IN | BODY MASS INDEX: 23.9 KG/M2 | SYSTOLIC BLOOD PRESSURE: 125 MMHG | RESPIRATION RATE: 16 BRPM

## 2025-01-21 PROCEDURE — 250N000011 HC RX IP 250 OP 636

## 2025-01-21 PROCEDURE — 88305 TISSUE EXAM BY PATHOLOGIST: CPT

## 2025-01-21 PROCEDURE — 250N000009 HC RX 250

## 2025-01-21 PROCEDURE — 43239 EGD BIOPSY SINGLE/MULTIPLE: CPT | Performed by: SURGERY

## 2025-01-21 PROCEDURE — 258N000003 HC RX IP 258 OP 636: Performed by: SURGERY

## 2025-01-21 RX ORDER — NALOXONE HYDROCHLORIDE 0.4 MG/ML
0.4 INJECTION, SOLUTION INTRAMUSCULAR; INTRAVENOUS; SUBCUTANEOUS
Status: DISCONTINUED | OUTPATIENT
Start: 2025-01-21 | End: 2025-01-21 | Stop reason: HOSPADM

## 2025-01-21 RX ORDER — LIDOCAINE 40 MG/G
CREAM TOPICAL
Status: DISCONTINUED | OUTPATIENT
Start: 2025-01-21 | End: 2025-01-21 | Stop reason: HOSPADM

## 2025-01-21 RX ORDER — NALOXONE HYDROCHLORIDE 0.4 MG/ML
0.2 INJECTION, SOLUTION INTRAMUSCULAR; INTRAVENOUS; SUBCUTANEOUS
Status: DISCONTINUED | OUTPATIENT
Start: 2025-01-21 | End: 2025-01-21 | Stop reason: HOSPADM

## 2025-01-21 RX ORDER — PROPOFOL 10 MG/ML
INJECTION, EMULSION INTRAVENOUS PRN
Status: DISCONTINUED | OUTPATIENT
Start: 2025-01-21 | End: 2025-01-21

## 2025-01-21 RX ORDER — SODIUM CHLORIDE, SODIUM LACTATE, POTASSIUM CHLORIDE, CALCIUM CHLORIDE 600; 310; 30; 20 MG/100ML; MG/100ML; MG/100ML; MG/100ML
INJECTION, SOLUTION INTRAVENOUS CONTINUOUS
Status: DISCONTINUED | OUTPATIENT
Start: 2025-01-21 | End: 2025-01-21 | Stop reason: HOSPADM

## 2025-01-21 RX ORDER — LIDOCAINE HYDROCHLORIDE 20 MG/ML
INJECTION, SOLUTION INFILTRATION; PERINEURAL PRN
Status: DISCONTINUED | OUTPATIENT
Start: 2025-01-21 | End: 2025-01-21

## 2025-01-21 RX ORDER — FLUMAZENIL 0.1 MG/ML
0.2 INJECTION, SOLUTION INTRAVENOUS
Status: DISCONTINUED | OUTPATIENT
Start: 2025-01-21 | End: 2025-01-21 | Stop reason: HOSPADM

## 2025-01-21 RX ORDER — PROPOFOL 10 MG/ML
INJECTION, EMULSION INTRAVENOUS CONTINUOUS PRN
Status: DISCONTINUED | OUTPATIENT
Start: 2025-01-21 | End: 2025-01-21

## 2025-01-21 RX ADMIN — PROPOFOL 200 MCG/KG/MIN: 10 INJECTION, EMULSION INTRAVENOUS at 09:43

## 2025-01-21 RX ADMIN — PROPOFOL 50 MG: 10 INJECTION, EMULSION INTRAVENOUS at 09:43

## 2025-01-21 RX ADMIN — LIDOCAINE HYDROCHLORIDE 60 MG: 20 INJECTION, SOLUTION INFILTRATION; PERINEURAL at 09:43

## 2025-01-21 RX ADMIN — SODIUM CHLORIDE, POTASSIUM CHLORIDE, SODIUM LACTATE AND CALCIUM CHLORIDE 30 ML/HR: 600; 310; 30; 20 INJECTION, SOLUTION INTRAVENOUS at 09:01

## 2025-01-21 ASSESSMENT — ENCOUNTER SYMPTOMS: DYSRHYTHMIAS: 1

## 2025-01-21 ASSESSMENT — ACTIVITIES OF DAILY LIVING (ADL)
ADLS_ACUITY_SCORE: 41

## 2025-01-21 NOTE — OR NURSING
Muna has been discharged to home at 1103 via car accompanied by     Written discharge instructions were provided to patient and .  Prescriptions were n/a.  Patient states their pain is denies, and denies any nausea or dizziness upon discharge.    Patient and adult caring for them verbalize understanding of discharge instructions including no driving until tomorrow and no longer taking narcotic pain medications - no operating mechanical equipment and no making any important decisions.They understand reason for discharge, and necessary follow-up appointments.

## 2025-01-21 NOTE — OP NOTE
PROCEDURE NOTE    DATE OF SERVICE: 1/21/2025    SURGEON: TANIA Avina MD     PRE-OP DIAGNOSIS:  dysphagia    POST-OP DIAGNOSIS:  none    PROCEDURE:   EGD with biopsy    ASSISTANT:  Abbieulator: Ene Salazar RN  Scrub Person: Uriel Malik    ANESTHESIA:  [facility] Independent: Donald Olivo APRN CRNA    INDICATION FOR THE PROCEDURE: Miguelina Estrada is a 69 year old female. The patient presents with dysphagia.     PROCEDURE:The patient was taken to the endoscopy suite. Appropriate monitors were attached. The patient was placed in the left lateral decubitus position. Bite block was positioned.Timeout was performed confirming the patient's identity and procedure to be performed. After appropriate sedation was confirmed, the flexible endoscope was advanced into the oropharynx. The posterior oropharynx appeared grossly normal. The scope was advanced into the proximal esophagus. The esophagus was insufflated with air. The scope was advanced under direct visualization. No acute abnormalities of the esophagus were noted. The scope was advanced into the stomach. The scope was advanced through the pylorus into the duodenal bulb. The bulb and distal duodenum appeared grossly normal.  The scope was withdrawn back into the stomach. Antral biopsy was obtained and sent to pathology. The scope was retroflexed and the GE junction inspected. No abnormalities were noted. No significant hiatal hernia appreciated. The scope was returned to a neutral position and the stomach was decompressed. The scope was withdrawn to the GE junction and biopsy obtained. The mucosa of the esophagus was inspected while withdrawing thescope. No stricture / other abnormalities were noted. The scope was withdrawn from the patient. The bite block was removed. The patient tolerated the procedure with no immediately apparent complication. The patient was taken to recovery instable condition.     ESTIMATED BLOOD LOSS:  none    COMPLICATIONS:  None    TISSUE REMOVED:  Yes    RECOMMEND:    Follow up biopsies    TANIA Avina MD

## 2025-01-21 NOTE — ANESTHESIA CARE TRANSFER NOTE
Patient: Miguelina Estrada    Procedure: Procedure(s):  ESOPHAGOGASTRODUODENOSCOPY, WITH BIOPSY       Diagnosis: Dysphagia, unspecified type [R13.10]  Diagnosis Additional Information: No value filed.    Anesthesia Type:   MAC     Note:    Oropharynx: spontaneously breathing and oropharynx clear of all foreign objects  Level of Consciousness: drowsy  Oxygen Supplementation: room air    Independent Airway: airway patency satisfactory and stable  Dentition: dentition unchanged  Vital Signs Stable: post-procedure vital signs reviewed and stable  Report to RN Given: handoff report given  Patient transferred to: Phase II    Handoff Report: Identifed the Patient, Identified the Reponsible Provider, Reviewed the pertinent medical history, Discussed the surgical course, Reviewed Intra-OP anesthesia mangement and issues during anesthesia, Set expectations for post-procedure period and Allowed opportunity for questions and acknowledgement of understanding      Vitals:  Vitals Value Taken Time   /49 01/21/25 1009   Temp 96  F (35.6  C) 01/21/25 1009   Pulse 66 01/21/25 1009   Resp 14 01/21/25 1009   SpO2 98 % 01/21/25 1010   Vitals shown include unfiled device data.    Electronically Signed By: NATALIA Ballard CRNA  January 21, 2025  10:10 AM

## 2025-01-21 NOTE — INTERVAL H&P NOTE
I saw and examined Miguelina J Natalie.  I have reviewed the history and physical and find no changes to the patient's medical status or condition with the exceptions noted below.     The technical details of EGD were discussed with the patient along with the risks and benefits to include bleeding, perforation and incomplete study. Miguelina J Natalie demonstrated understanding and is willing to proceed.       Andrew Avina MD   9:27 AM 1/21/2025

## 2025-01-21 NOTE — ANESTHESIA PREPROCEDURE EVALUATION
Anesthesia Pre-Procedure Evaluation    Patient: Miguelina Estrada   MRN: [id number] : 1955        Procedure : Procedure(s):  Esophagoscopy, gastroscopy, duodenoscopy (EGD), combined          Past Medical History:   Diagnosis Date    Asymptomatic premature menopause     mid 30s    Cardiac arrhythmia     No Comments Provided    Diaphragmatic hernia without obstruction or gangrene     No Comments Provided    Hypoglycemia     episodes of hypoglycemia if not eating    Paroxysmal atrial fibrillation (H) 2018    Pupillary abnormality of both eyes     ,Luisie's tonic pupil (bilateral)      Past Surgical History:   Procedure Laterality Date    COLONOSCOPY  2017    Done by Dr. Barbara Goss    ENDOSCOPY UPPER, COLONOSCOPY, COMBINED      2017    ESOPHAGOSCOPY, GASTROSCOPY, DUODENOSCOPY (EGD), COMBINED          HYSTERECTOMY TOTAL ABDOMINAL, BILATERAL SALPINGO-OOPHORECTOMY, COMBINED          LAPAROSCOPIC CHOLECYSTECTOMY          LAPAROSCOPIC TUBAL LIGATION            Allergies   Allergen Reactions    Clindamycin Difficulty breathing    Erythromycin Nausea and Vomiting    Levofloxacin Other (See Comments) and Palpitations    Penicillins Rash    Sulfa Antibiotics Rash      Social History     Tobacco Use    Smoking status: Never     Passive exposure: Current    Smokeless tobacco: Never   Substance Use Topics    Alcohol use: No     Alcohol/week: 0.0 standard drinks of alcohol      Wt Readings from Last 1 Encounters:   25 63.5 kg (140 lb)        Anesthesia Evaluation   Pt has had prior anesthetic.     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)      Cardiovascular:     (+)  - -   -  - -   Taking blood thinners Pt has received instructions:                    dysrhythmias, a-fib,             METS/Exercise Tolerance: >4 METS    Hematologic:  - neg hematologic   "ROS     Musculoskeletal:  - neg musculoskeletal ROS     GI/Hepatic:     (+) GERD, Symptomatic,    hiatal hernia,              Renal/Genitourinary:  - neg Renal ROS     Endo: Comment: Non-toxic nodular goiter    (+)          thyroid problem,            Psychiatric/Substance Use:  - neg psychiatric ROS     Infectious Disease:  - neg infectious disease ROS     Malignancy:  - neg malignancy ROS     Other:  - neg other ROS          Physical Exam    Airway        Mallampati: II   TM distance: > 3 FB   Neck ROM: full   Mouth opening: > 3 cm    Respiratory Devices and Support         Dental       (+) Completely normal teeth      Cardiovascular   cardiovascular exam normal       Rhythm and rate: regular and normal     Pulmonary   pulmonary exam normal        breath sounds clear to auscultation           OUTSIDE LABS:  CBC:   Lab Results   Component Value Date    WBC 4.6 01/13/2025    WBC 10.3 02/23/2024    HGB 14.1 01/13/2025    HGB 15.3 02/23/2024    HCT 41.3 01/13/2025    HCT 43.2 02/23/2024     01/13/2025     02/23/2024     BMP:   Lab Results   Component Value Date     01/13/2025     02/23/2024    POTASSIUM 4.3 01/13/2025    POTASSIUM 3.9 02/23/2024    CHLORIDE 101 01/13/2025    CHLORIDE 97 (L) 02/23/2024    CO2 28 01/13/2025    CO2 26 02/23/2024    BUN 10.0 01/13/2025    BUN 8.7 02/23/2024    CR 0.84 01/13/2025    CR 0.73 02/23/2024    GLC 94 01/13/2025     (H) 02/23/2024     COAGS:   Lab Results   Component Value Date    PTT 35 02/23/2024    INR 1.29 (H) 02/23/2024     POC: No results found for: \"BGM\", \"HCG\", \"HCGS\"  HEPATIC:   Lab Results   Component Value Date    ALBUMIN 4.3 01/13/2025    PROTTOTAL 7.4 01/13/2025    ALT 18 01/13/2025    AST 24 01/13/2025    ALKPHOS 94 01/13/2025    BILITOTAL 0.5 01/13/2025     OTHER:   Lab Results   Component Value Date    A1C 5.2 10/21/2020    ALVERTO 9.6 01/13/2025    LIPASE 27 02/23/2024    TSH 3.64 01/13/2025    T4 0.91 04/19/2023    T3 103 12/04/2020 "       Anesthesia Plan    ASA Status:  2    NPO Status:  NPO Appropriate    Anesthesia Type: MAC.              Consents    Anesthesia Plan(s) and associated risks, benefits, and realistic alternatives discussed. Questions answered and patient/representative(s) expressed understanding.     - Discussed: Risks, Benefits and Alternatives for BOTH SEDATION and the PROCEDURE were discussed     - Discussed with:  Patient      - Extended Intubation/Ventilatory Support Discussed: No.      - Patient is DNR/DNI Status: No     Use of blood products discussed: No .     Postoperative Care            Comments:               NATALIA Conley CRNA    I have reviewed the pertinent notes and labs in the chart from the past 30 days and (re)examined the patient.  Any updates or changes from those notes are reflected in this note.            # Drug Induced Coagulation Defect: home medication list includes an anticoagulant medication

## 2025-01-21 NOTE — ANESTHESIA POSTPROCEDURE EVALUATION
Patient: Miguelina Estrada    Procedure: Procedure(s):  ESOPHAGOGASTRODUODENOSCOPY, WITH BIOPSY       Anesthesia Type:  MAC    Note:  Disposition: Outpatient   Postop Pain Control: Uneventful            Sign Out: Well controlled pain   PONV: No   Neuro/Psych: Uneventful            Sign Out: Acceptable/Baseline neuro status   Airway/Respiratory: Uneventful            Sign Out: Acceptable/Baseline resp. status   CV/Hemodynamics: Uneventful            Sign Out: Acceptable CV status; No obvious hypovolemia; No obvious fluid overload   Other NRE: NONE   DID A NON-ROUTINE EVENT OCCUR?            Last vitals:  Vitals Value Taken Time   /70 01/21/25 1030   Temp 96  F (35.6  C) 01/21/25 1009   Pulse 63 01/21/25 1030   Resp 16 01/21/25 1015   SpO2 100 % 01/21/25 1042   Vitals shown include unfiled device data.    Electronically Signed By: NATALIA Conley CRNA  January 21, 2025  10:43 AM

## 2025-01-21 NOTE — DISCHARGE INSTRUCTIONS
[facility]  Adult Discharge Orders & Instructions    ________________________________________________________________          For 12 hours after surgery  Get plenty of rest.  A responsible adult must stay with you for at least 12 hours after you leave the hospital.   You may feel lightheaded.  IF so, sit for a few minutes before standing.  Have someone help you get up.   You may have a slight fever. Call the doctor if your fever is over 101 F (38.3 C) (taken under the tongue) or lasts longer than 24 hours.  You may have a dry mouth, a sore throat, muscle aches or trouble sleeping.  These should go away after 24 hours.  Do not make important or legal decisions.  6.   Do not drive or use heavy equipment.  If you have weakness or tingling, don't drive or use heavy equipment until this feeling goes away.    To contact a doctor, call   [phone]_______________________

## 2025-01-23 LAB
PATH REPORT.COMMENTS IMP SPEC: NORMAL
PATH REPORT.FINAL DX SPEC: NORMAL
PATH REPORT.RELEVANT HX SPEC: NORMAL
PHOTO IMAGE: NORMAL

## 2025-05-22 ENCOUNTER — TELEPHONE (OUTPATIENT)
Dept: FAMILY MEDICINE | Facility: OTHER | Age: 70
End: 2025-05-22
Payer: COMMERCIAL

## (undated) DEVICE — SYR 50ML LL W/O NDL 309653

## (undated) DEVICE — Device

## (undated) DEVICE — SUCTION MANIFOLD NEPTUNE 2 SYS 4 PORT 0702-020-000

## (undated) DEVICE — ENDO KIT COMPLIANCE DYKENDOCMPLY

## (undated) DEVICE — SOL WATER 1500ML

## (undated) DEVICE — ENDO FORCEP ENDOJAW BIOPSY 2.8MMX230CM FB-220U

## (undated) DEVICE — ENDO BITE BLOCK 60 MAXI LF 00712804

## (undated) DEVICE — TUBING SUCTION 10'X3/16" N510

## (undated) RX ORDER — LORAZEPAM 2 MG/ML
INJECTION INTRAMUSCULAR
Status: DISPENSED
Start: 2024-02-23

## (undated) RX ORDER — MAGNESIUM HYDROXIDE/ALUMINUM HYDROXICE/SIMETHICONE 120; 1200; 1200 MG/30ML; MG/30ML; MG/30ML
SUSPENSION ORAL
Status: DISPENSED
Start: 2024-02-23

## (undated) RX ORDER — PROPOFOL 10 MG/ML
INJECTION, EMULSION INTRAVENOUS
Status: DISPENSED
Start: 2025-01-21